# Patient Record
Sex: MALE | Race: WHITE | NOT HISPANIC OR LATINO | Employment: FULL TIME | URBAN - METROPOLITAN AREA
[De-identification: names, ages, dates, MRNs, and addresses within clinical notes are randomized per-mention and may not be internally consistent; named-entity substitution may affect disease eponyms.]

---

## 2016-01-26 LAB — HBA1C MFR BLD HPLC: 9.3 %

## 2016-05-23 LAB — HBA1C MFR BLD HPLC: 9.3 %

## 2017-01-19 ENCOUNTER — HOSPITAL ENCOUNTER (OUTPATIENT)
Dept: RADIOLOGY | Facility: HOSPITAL | Age: 47
Discharge: HOME | End: 2017-01-19
Attending: RADIOLOGY

## 2017-01-19 DIAGNOSIS — Z76.89 REFERRAL OF PATIENT WITHOUT EXAMINATION OR TREATMENT: ICD-10-CM

## 2017-01-21 LAB — HBA1C MFR BLD HPLC: 7.8 %

## 2017-05-08 ENCOUNTER — TRANSCRIBE ORDERS (OUTPATIENT)
Dept: ADMINISTRATIVE | Facility: HOSPITAL | Age: 47
End: 2017-05-08

## 2017-05-08 DIAGNOSIS — R11.2 NAUSEA AND VOMITING, INTRACTABILITY OF VOMITING NOT SPECIFIED, UNSPECIFIED VOMITING TYPE: Primary | ICD-10-CM

## 2017-06-15 ENCOUNTER — HOSPITAL ENCOUNTER (OUTPATIENT)
Dept: NUCLEAR MEDICINE | Facility: HOSPITAL | Age: 47
Discharge: HOME | End: 2017-06-15
Attending: INTERNAL MEDICINE
Payer: COMMERCIAL

## 2017-06-15 VITALS — WEIGHT: 210 LBS

## 2017-06-15 DIAGNOSIS — R11.2 NAUSEA AND VOMITING, INTRACTABILITY OF VOMITING NOT SPECIFIED, UNSPECIFIED VOMITING TYPE: ICD-10-CM

## 2017-06-15 PROCEDURE — A9541 TC99M SULFUR COLLOID: HCPCS

## 2017-06-15 PROCEDURE — 78264 GASTRIC EMPTYING IMG STUDY: CPT

## 2017-10-17 LAB — HBA1C MFR BLD HPLC: 8.3 %

## 2018-08-30 ENCOUNTER — OFFICE VISIT (OUTPATIENT)
Dept: ENDOCRINOLOGY | Facility: HOSPITAL | Age: 48
End: 2018-08-30
Payer: COMMERCIAL

## 2018-08-30 ENCOUNTER — TELEPHONE (OUTPATIENT)
Dept: ENDOCRINOLOGY | Facility: HOSPITAL | Age: 48
End: 2018-08-30

## 2018-08-30 VITALS
BODY MASS INDEX: 29.54 KG/M2 | SYSTOLIC BLOOD PRESSURE: 132 MMHG | WEIGHT: 211 LBS | HEIGHT: 71 IN | DIASTOLIC BLOOD PRESSURE: 78 MMHG

## 2018-08-30 DIAGNOSIS — I10 ESSENTIAL HYPERTENSION: ICD-10-CM

## 2018-08-30 DIAGNOSIS — E10.42 TYPE 1 DIABETES MELLITUS WITH DIABETIC POLYNEUROPATHY (HCC): ICD-10-CM

## 2018-08-30 DIAGNOSIS — E78.5 HYPERLIPIDEMIA, UNSPECIFIED HYPERLIPIDEMIA TYPE: ICD-10-CM

## 2018-08-30 DIAGNOSIS — E10.42 TYPE 1 DIABETES MELLITUS WITH DIABETIC POLYNEUROPATHY (HCC): Primary | ICD-10-CM

## 2018-08-30 DIAGNOSIS — E10.319 DIABETIC RETINOPATHY ASSOCIATED WITH TYPE 1 DIABETES MELLITUS, MACULAR EDEMA PRESENCE UNSPECIFIED, UNSPECIFIED LATERALITY, UNSPECIFIED RETINOPATHY SEVERITY (HCC): ICD-10-CM

## 2018-08-30 PROCEDURE — 99204 OFFICE O/P NEW MOD 45 MIN: CPT | Performed by: INTERNAL MEDICINE

## 2018-08-30 RX ORDER — LISINOPRIL 20 MG/1
20 TABLET ORAL DAILY
Qty: 90 TABLET | Refills: 3 | Status: SHIPPED | OUTPATIENT
Start: 2018-08-30 | End: 2019-09-30

## 2018-08-30 RX ORDER — GABAPENTIN 400 MG/1
CAPSULE ORAL
Qty: 90 CAPSULE | Refills: 3 | Status: SHIPPED | OUTPATIENT
Start: 2018-08-30 | End: 2019-03-07 | Stop reason: SDUPTHER

## 2018-08-30 RX ORDER — AZELASTINE HYDROCHLORIDE AND FLUTICASONE PROPIONATE 137; 50 UG/1; UG/1
SPRAY, METERED NASAL
Refills: 5 | COMMUNITY
Start: 2018-08-28

## 2018-08-30 RX ORDER — GABAPENTIN 100 MG/1
100 CAPSULE ORAL 3 TIMES DAILY
Refills: 3 | COMMUNITY
Start: 2018-07-13 | End: 2018-08-30 | Stop reason: SDUPTHER

## 2018-08-30 RX ORDER — GABAPENTIN 100 MG/1
400 CAPSULE ORAL 3 TIMES DAILY
Qty: 180 CAPSULE | Refills: 5 | Status: SHIPPED | OUTPATIENT
Start: 2018-08-30 | End: 2018-08-30 | Stop reason: SDUPTHER

## 2018-08-30 RX ORDER — SIMVASTATIN 40 MG
40 TABLET ORAL
Refills: 3 | COMMUNITY
Start: 2018-07-13 | End: 2018-08-30 | Stop reason: SDUPTHER

## 2018-08-30 RX ORDER — LISINOPRIL 20 MG/1
20 TABLET ORAL DAILY
Refills: 3 | COMMUNITY
Start: 2018-08-27 | End: 2018-08-30 | Stop reason: SDUPTHER

## 2018-08-30 RX ORDER — SIMVASTATIN 40 MG
40 TABLET ORAL
Qty: 90 TABLET | Refills: 3 | Status: SHIPPED | OUTPATIENT
Start: 2018-08-30 | End: 2019-10-17 | Stop reason: SDUPTHER

## 2018-08-30 RX ORDER — BUPROPION HYDROCHLORIDE 300 MG/1
300 TABLET ORAL DAILY
Refills: 3 | COMMUNITY
Start: 2018-05-31

## 2018-08-30 NOTE — LETTER
August 30, 2018     Tran Bedolla DO  [address]  [place] [address]    Patient: Rena Aldridge   YOB: 1970   Date of Visit: 8/30/2018       Dear Dr Keena Palmer: Thank you for referring Rena Aldridge to me for evaluation  Below are my notes for this consultation  If you have questions, please do not hesitate to call me  I look forward to following your patient along with you  Sincerely,        Tanna Edwards DO        CC: No Recipients  Tanna Edwards DO  8/30/2018  9:27 AM  Sign at close encounter  8/30/2018    Assessment/Plan      Diagnoses and all orders for this visit:    Type 1 diabetes mellitus with diabetic polyneuropathy ([facility] [address] )  -     HEMOGLOBIN A1C W/ EAG ESTIMATION Lab Collect; Future  -     Comprehensive metabolic panel Lab Collect; Future  -     Microalbumin / creatinine urine ratio- Lab Collect; Future  -     gabapentin (NEURONTIN) 100 mg capsule; Take 4 capsules (400 mg total) by mouth 3 (three) times a day  -     Comprehensive metabolic panel Lab Collect; Future  -     TSH, 3rd generation Lab Collect; Future  -     HEMOGLOBIN A1C W/ EAG ESTIMATION Lab Collect; Future    Diabetic retinopathy associated with type 1 diabetes mellitus, macular edema presence unspecified, unspecified laterality, unspecified retinopathy severity (HCC)    Essential hypertension  -     lisinopril (ZESTRIL) 20 mg tablet; Take 1 tablet (20 mg total) by mouth daily    Hyperlipidemia, unspecified hyperlipidemia type  -     Lipid Panel with Direct LDL reflex Lab Collect; Future  -     simvastatin (ZOCOR) 40 mg tablet; Take 1 tablet (40 mg total) by mouth daily at bedtime    Other orders  -     DYMISTA 137-50 MCG/ACT SUSP; SPRAY 1 SPRAY IN EACH NOSTRIL BY INTRANASAL ROUTE 2 TIMES PER DAY  -     buPROPion (WELLBUTRIN XL) 300 mg 24 hr tablet; Take 300 mg by mouth daily  -     Discontinue: gabapentin (NEURONTIN) 100 mg capsule;  Take 100 mg by mouth 3 (three) times a day  -     NOVOLOG 100 UNIT/ML injection; USE UP  UNITS EVERY DAY AS DIRECTED  -     Discontinue: lisinopril (ZESTRIL) 20 mg tablet; Take 20 mg by mouth daily  -     Discontinue: simvastatin (ZOCOR) 40 mg tablet; Take 40 mg by mouth daily at bedtime        Assessment/Plan:  1  Type 1 diabetes with history of neuropathy and retinopathy per prior records: To target his hyperglycemia at bedtime, I have adjusted his 12:00 p m  insulin to carbohydrate ratio to 6  Continue all other settings as is  I provided information for the [employer] representative as well as upgrade options as he is out of warranty with his current animas pump  He is interested in 12 G system and will pursue that if possible  Follow-up in 3 months with labs just prior  I have ordered labs for now to update his blood work and we will call with the results  Current animas vibe insulin pump settings are as follows:  Basal rates:  12:00 a m  1 3, 2:00 a m  1 6, 8:00 a m  2 5, 12:00 p m  1 6, 10:00 p m  1 3  Insulin carbohydrate ratio 12:00 a m  6, 12:00 p m  6, ISF 30, target 120+/-10     2   Hypertension:  Continue lisinopril 20 mg daily  3   Hyperlipidemia:  Continue simvastatin 40 mg daily  CC: Diabetes Consult    History of Present Illness     HPI: Emerson Licea is a 57y o  year old male with type 1 diabetes for about 20 years  He is on insulin at home and takes Novolog via insulin pump  He denies any polyuria, polydipsia, nocturia and blurry vision  He denies nephropathy but does admit to neuropathy and retinopathy  Hypoglycemic episodes: No      The patient is due for an eye exam with  [facility]  Blood Sugar/Glucometer/Pump/CGM review: Insulin pump download of his animus pump shows average blood sugar of 250 an average C GM sugar of 205  Average insulin doses 77 5 units per day on average carb intake is 143 g per day  49% is basal and 51% is bolus    Blood sugars are higher in the morning though he is having hyperglycemia after dinner and before bedtime  On the C GM data his blood sugars appear stable from bedtime to morning  For hypertension he takes lisinopril 20 mg daily and for hyperlipidemia he is on simvastatin 40 mg daily  Review of Systems   Constitutional: Negative for chills, fatigue and fever  HENT: Negative for trouble swallowing and voice change  Eyes: Negative for visual disturbance  Respiratory: Negative for shortness of breath  Cardiovascular: Negative for chest pain, palpitations and leg swelling  Gastrointestinal: Negative for abdominal pain, nausea and vomiting  Endocrine: Negative for polydipsia and polyuria  Musculoskeletal: Negative for arthralgias and myalgias  Skin: Negative for rash  Neurological: Negative for dizziness, tremors and weakness  Hematological: Negative for adenopathy  Psychiatric/Behavioral: Negative for agitation and confusion  Historical Information   History reviewed  No pertinent past medical history  History reviewed  No pertinent surgical history  Social History   History   Alcohol Use    Yes     Comment: 1 drink daily     History   Drug Use No     History   Smoking Status    Former Smoker   Smokeless Tobacco    Former User     Family History: History reviewed  No pertinent family history      Meds/Allergies   Current Outpatient Prescriptions   Medication Sig Dispense Refill    buPROPion (WELLBUTRIN XL) 300 mg 24 hr tablet Take 300 mg by mouth daily  3    DYMISTA 137-50 MCG/ACT SUSP SPRAY 1 SPRAY IN EACH NOSTRIL BY INTRANASAL ROUTE 2 TIMES PER DAY  5    gabapentin (NEURONTIN) 100 mg capsule Take 4 capsules (400 mg total) by mouth 3 (three) times a day 180 capsule 5    lisinopril (ZESTRIL) 20 mg tablet Take 1 tablet (20 mg total) by mouth daily 90 tablet 3    NOVOLOG 100 UNIT/ML injection USE UP  UNITS EVERY DAY AS DIRECTED  3    simvastatin (ZOCOR) 40 mg tablet Take 1 tablet (40 mg total) by mouth daily at bedtime 90 tablet 3     No current facility-administered medications for this visit  Allergies   Allergen Reactions    Grass Extracts [Gramineae Pollens]     Tree Extract        Objective   Vitals: Blood pressure 132/78, height 5' 11" (1 803 m), weight 95 7 kg (211 lb)  Invasive Devices          No matching active lines, drains, or airways          Physical Exam   Constitutional: He is oriented to person, place, and time  He appears well-developed and well-nourished  No distress  HENT:   Head: Normocephalic and atraumatic  Eyes: Conjunctivae and EOM are normal  Pupils are equal, round, and reactive to light  Neck: Normal range of motion  Neck supple  No thyromegaly present  Cardiovascular: Normal rate and regular rhythm  No murmur heard  Pulmonary/Chest: Effort normal and breath sounds normal  No respiratory distress  He has no wheezes  Abdominal: Soft  Bowel sounds are normal  He exhibits no distension  There is no tenderness  Musculoskeletal: Normal range of motion  He exhibits no edema  Neurological: He is alert and oriented to person, place, and time  He exhibits normal muscle tone  Skin: Skin is warm and dry  No rash noted  He is not diaphoretic  Psychiatric: He has a normal mood and affect  His behavior is normal    Vitals reviewed  The history was obtained from the review of the chart and from the patient  Lab Results:    Most recent Alc is  Lab Results   Component Value Date    HGBA1C 8 3 10/17/2017             Recent Results (from the past 40897 hour(s))   Hemoglobin A1C    Collection Time: 10/17/17 12:00 AM   Result Value Ref Range    Hemoglobin A1C 8 3          No future appointments

## 2018-08-30 NOTE — PROGRESS NOTES
8/30/2018    Assessment/Plan      Diagnoses and all orders for this visit:    Type 1 diabetes mellitus with diabetic polyneuropathy ([facility] [address] )  -     HEMOGLOBIN A1C W/ EAG ESTIMATION Lab Collect; Future  -     Comprehensive metabolic panel Lab Collect; Future  -     Microalbumin / creatinine urine ratio- Lab Collect; Future  -     gabapentin (NEURONTIN) 100 mg capsule; Take 4 capsules (400 mg total) by mouth 3 (three) times a day  -     Comprehensive metabolic panel Lab Collect; Future  -     TSH, 3rd generation Lab Collect; Future  -     HEMOGLOBIN A1C W/ EAG ESTIMATION Lab Collect; Future    Diabetic retinopathy associated with type 1 diabetes mellitus, macular edema presence unspecified, unspecified laterality, unspecified retinopathy severity (HCC)    Essential hypertension  -     lisinopril (ZESTRIL) 20 mg tablet; Take 1 tablet (20 mg total) by mouth daily    Hyperlipidemia, unspecified hyperlipidemia type  -     Lipid Panel with Direct LDL reflex Lab Collect; Future  -     simvastatin (ZOCOR) 40 mg tablet; Take 1 tablet (40 mg total) by mouth daily at bedtime    Other orders  -     DYMISTA 137-50 MCG/ACT SUSP; SPRAY 1 SPRAY IN EACH NOSTRIL BY INTRANASAL ROUTE 2 TIMES PER DAY  -     buPROPion (WELLBUTRIN XL) 300 mg 24 hr tablet; Take 300 mg by mouth daily  -     Discontinue: gabapentin (NEURONTIN) 100 mg capsule; Take 100 mg by mouth 3 (three) times a day  -     NOVOLOG 100 UNIT/ML injection; USE UP  UNITS EVERY DAY AS DIRECTED  -     Discontinue: lisinopril (ZESTRIL) 20 mg tablet; Take 20 mg by mouth daily  -     Discontinue: simvastatin (ZOCOR) 40 mg tablet; Take 40 mg by mouth daily at bedtime        Assessment/Plan:  1  Type 1 diabetes with history of neuropathy and retinopathy per prior records: To target his hyperglycemia at bedtime, I have adjusted his 12:00 p m  insulin to carbohydrate ratio to 6  Continue all other settings as is    I provided information for the [employer] representative as well as upgrade options as he is out of warranty with his current animas pump  He is interested in 12 G system and will pursue that if possible  Follow-up in 3 months with labs just prior  I have ordered labs for now to update his blood work and we will call with the results  Current animas jene insulin pump settings are as follows:  Basal rates:  12:00 a m  1 3, 2:00 a m  1 6, 8:00 a m  2 5, 12:00 p m  1 6, 10:00 p m  1 3  Insulin carbohydrate ratio 12:00 a m  6, 12:00 p m  6, ISF 30, target 120+/-10     2   Hypertension:  Continue lisinopril 20 mg daily  3   Hyperlipidemia:  Continue simvastatin 40 mg daily  CC: Diabetes Consult    History of Present Illness     HPI: Cecilia Carlos is a 57y o  year old male with type 1 diabetes for about 20 years  He is on insulin at home and takes Novolog via insulin pump  He denies any polyuria, polydipsia, nocturia and blurry vision  He denies nephropathy but does admit to neuropathy and retinopathy  Hypoglycemic episodes: No      The patient is due for an eye exam with dr Hunter Mensah  Blood Sugar/Glucometer/Pump/CGM review: Insulin pump download of his animus pump shows average blood sugar of 250 an average C GM sugar of 205  Average insulin doses 77 5 units per day on average carb intake is 143 g per day  49% is basal and 51% is bolus  Blood sugars are higher in the morning though he is having hyperglycemia after dinner and before bedtime  On the C GM data his blood sugars appear stable from bedtime to morning  For hypertension he takes lisinopril 20 mg daily and for hyperlipidemia he is on simvastatin 40 mg daily  Review of Systems   Constitutional: Negative for chills, fatigue and fever  HENT: Negative for trouble swallowing and voice change  Eyes: Negative for visual disturbance  Respiratory: Negative for shortness of breath  Cardiovascular: Negative for chest pain, palpitations and leg swelling     Gastrointestinal: Negative for abdominal pain, nausea and vomiting  Endocrine: Negative for polydipsia and polyuria  Musculoskeletal: Negative for arthralgias and myalgias  Skin: Negative for rash  Neurological: Negative for dizziness, tremors and weakness  Hematological: Negative for adenopathy  Psychiatric/Behavioral: Negative for agitation and confusion  Historical Information   History reviewed  No pertinent past medical history  History reviewed  No pertinent surgical history  Social History   History   Alcohol Use    Yes     Comment: 1 drink daily     History   Drug Use No     History   Smoking Status    Former Smoker   Smokeless Tobacco    Former User     Family History: History reviewed  No pertinent family history  Meds/Allergies   Current Outpatient Prescriptions   Medication Sig Dispense Refill    buPROPion (WELLBUTRIN XL) 300 mg 24 hr tablet Take 300 mg by mouth daily  3    DYMISTA 137-50 MCG/ACT SUSP SPRAY 1 SPRAY IN EACH NOSTRIL BY INTRANASAL ROUTE 2 TIMES PER DAY  5    gabapentin (NEURONTIN) 100 mg capsule Take 4 capsules (400 mg total) by mouth 3 (three) times a day 180 capsule 5    lisinopril (ZESTRIL) 20 mg tablet Take 1 tablet (20 mg total) by mouth daily 90 tablet 3    NOVOLOG 100 UNIT/ML injection USE UP  UNITS EVERY DAY AS DIRECTED  3    simvastatin (ZOCOR) 40 mg tablet Take 1 tablet (40 mg total) by mouth daily at bedtime 90 tablet 3     No current facility-administered medications for this visit  Allergies   Allergen Reactions    Grass Extracts [Gramineae Pollens]     Tree Extract        Objective   Vitals: Blood pressure 132/78, height 5' 11" (1 803 m), weight 95 7 kg (211 lb)  Invasive Devices          No matching active lines, drains, or airways          Physical Exam   Constitutional: He is oriented to person, place, and time  He appears well-developed and well-nourished  No distress  HENT:   Head: Normocephalic and atraumatic     Eyes: Conjunctivae and EOM are normal  Pupils are equal, round, and reactive to light  Neck: Normal range of motion  Neck supple  No thyromegaly present  Cardiovascular: Normal rate and regular rhythm  No murmur heard  Pulmonary/Chest: Effort normal and breath sounds normal  No respiratory distress  He has no wheezes  Abdominal: Soft  Bowel sounds are normal  He exhibits no distension  There is no tenderness  Musculoskeletal: Normal range of motion  He exhibits no edema  Neurological: He is alert and oriented to person, place, and time  He exhibits normal muscle tone  Skin: Skin is warm and dry  No rash noted  He is not diaphoretic  Psychiatric: He has a normal mood and affect  His behavior is normal    Vitals reviewed  The history was obtained from the review of the chart and from the patient  Lab Results:    Most recent Alc is  Lab Results   Component Value Date    HGBA1C 8 3 10/17/2017             Recent Results (from the past 24065 hour(s))   Hemoglobin A1C    Collection Time: 10/17/17 12:00 AM   Result Value Ref Range    Hemoglobin A1C 8 3          No future appointments

## 2018-08-30 NOTE — TELEPHONE ENCOUNTER
Pt called picked up his script of gabapentin its 100 mg tablets to take 4 tablets (400mg) three times a day, he thought you were going to give him 400 mg tablets, and he wanted 90 days and he says he only got enough for 15 days

## 2018-09-09 LAB
ALBUMIN SERPL-MCNC: 4.2 G/DL (ref 3.5–5.5)
ALBUMIN/CREAT UR: 22.1 MG/G CREAT (ref 0–30)
ALBUMIN/GLOB SERPL: 1.6 {RATIO} (ref 1.2–2.2)
ALP SERPL-CCNC: 61 IU/L (ref 39–117)
ALT SERPL-CCNC: 24 IU/L (ref 0–44)
AMBIG ABBREV DEFAULT: NORMAL
AST SERPL-CCNC: 31 IU/L (ref 0–40)
BILIRUB SERPL-MCNC: 0.2 MG/DL (ref 0–1.2)
BUN SERPL-MCNC: 8 MG/DL (ref 6–24)
BUN/CREAT SERPL: 9 (ref 9–20)
CALCIUM SERPL-MCNC: 8.9 MG/DL (ref 8.7–10.2)
CHLORIDE SERPL-SCNC: 98 MMOL/L (ref 96–106)
CHOLEST SERPL-MCNC: 170 MG/DL (ref 100–199)
CO2 SERPL-SCNC: 23 MMOL/L (ref 20–29)
CREAT SERPL-MCNC: 0.9 MG/DL (ref 0.76–1.27)
CREAT UR-MCNC: 166.5 MG/DL
EST. AVERAGE GLUCOSE BLD GHB EST-MCNC: 114 MG/DL
GLOBULIN SER-MCNC: 2.6 G/DL (ref 1.5–4.5)
GLUCOSE SERPL-MCNC: 199 MG/DL (ref 65–99)
HBA1C MFR BLD: 5.6 % (ref 4.8–5.6)
HDLC SERPL-MCNC: 76 MG/DL
LDLC SERPL CALC-MCNC: 80 MG/DL (ref 0–99)
LDLC/HDLC SERPL: 1.1 RATIO (ref 0–3.6)
MICROALBUMIN UR-MCNC: 36.8 UG/ML
POTASSIUM SERPL-SCNC: 4.3 MMOL/L (ref 3.5–5.2)
PROT SERPL-MCNC: 6.8 G/DL (ref 6–8.5)
SL AMB EGFR AFRICAN AMERICAN: 118 ML/MIN/1.73
SL AMB EGFR NON AFRICAN AMERICAN: 102 ML/MIN/1.73
SL AMB VLDL CHOLESTEROL CALC: 14 MG/DL (ref 5–40)
SODIUM SERPL-SCNC: 138 MMOL/L (ref 134–144)
TRIGL SERPL-MCNC: 72 MG/DL (ref 0–149)

## 2018-10-03 ENCOUNTER — OFFICE VISIT (OUTPATIENT)
Dept: DIABETES SERVICES | Facility: CLINIC | Age: 48
End: 2018-10-03

## 2018-10-03 DIAGNOSIS — E10.42 TYPE 1 DIABETES MELLITUS WITH DIABETIC POLYNEUROPATHY (HCC): ICD-10-CM

## 2018-10-03 PROCEDURE — MPSTAR

## 2018-10-03 NOTE — PROGRESS NOTES
Zuly Ch has been using an [employer] pump and a DexCom 5 sensor  He is switching to the 670 G Medtronic pump and the guardian sensor  He has used a [employer]tr[employer] pump in the past and the and the enlite sensor  Areas reviewed with Zuly Ch include filling a reservoir and inserting into the pump,  Setting/editing basal and bolus advisor  Administering a bolus dose for meal bolus or correction bolus  We also discussed the need to change his infusion set every 3 days  We did link his meter to the pump,  He has other test strips that he will enter manually to use up his supply  We discussed him having a travel emily with his pump supplies that he can can with him to work  He does keep fasting acting carbs with him  Zuly Ch was instructed in th use of the guardian sensor and was able to insert and properly tape the sensor  His sensor was linked and the following alerts were programed alert before low, low alert - 90 per his request, high alert - 225, and alert on high  He understands the difference between his sensor and blood glucose reading  Calibration process on day 1 and then calibration daily at least every 12 hours  He left the office in warmup mode  He is scheduled for auto mode in a few weeks

## 2018-10-03 NOTE — PATIENT INSTRUCTIONS
Calibrate sensor on 1st day within 2 hours of start when prompted, then with 6 hours and then 12 hours  Calibration required at least every 12 hours  Karrie Nissen

## 2018-10-22 ENCOUNTER — OFFICE VISIT (OUTPATIENT)
Dept: DIABETES SERVICES | Facility: CLINIC | Age: 48
End: 2018-10-22

## 2018-10-22 DIAGNOSIS — E10.42 TYPE 1 DIABETES MELLITUS WITH DIABETIC POLYNEUROPATHY (HCC): ICD-10-CM

## 2018-10-22 PROCEDURE — MWEEKF

## 2018-10-23 NOTE — PROGRESS NOTES
Sandy Ambriz has been on the 670 G pump with sensor for over 2 weeks he has been calibrating at least 2 times a day  We reviewed the taping process for the sensor  A download of his pump was done and I have asked him to sign up for [employer] so that he will be able to download from home  I sent him an invitation to our practice  At this session we was started in auto mode  The home screen and the operation of the manual, auto and safe mode were explained to him  I discussed reasons he would be existed out of auto mode and how to return  He is unable to return to office net week, he will be out of the area  I have asked him to do a download and notify us so that it can be reviewed  He is to contact [facility] for questions or [employer]  He is scheduled for a follow up appointment in December with his doctor

## 2018-10-24 ENCOUNTER — TELEPHONE (OUTPATIENT)
Dept: ENDOCRINOLOGY | Facility: HOSPITAL | Age: 48
End: 2018-10-24

## 2018-10-24 NOTE — TELEPHONE ENCOUNTER
Reviewed 670 G insulin pump download from 10/09/2018 through 10/22/2018: He is in manual mode 100% of the time  Average sensor blood sugars 191 with a standard deviation of 46  Total daily doses 71 units  44% is bolus  I would suggest we adjust his carbohydrate ratio to 5 0  I would also increase 12:00 a m  basal rate to 1 point 3 5 and 2:00 a m  basal rate 1 65  After these changes insulin pump setting should be as follows:  Basal rates:  12:00 a m  1 35, 2:00 a m  1 65, 8:00 a m  2 5, 12:00 p m  1 6, 10:00 p m  1 3  Carb ratio 5 0, sensitivity 30, target 130

## 2018-11-02 ENCOUNTER — TELEPHONE (OUTPATIENT)
Dept: ENDOCRINOLOGY | Facility: HOSPITAL | Age: 48
End: 2018-11-02

## 2018-12-11 LAB
ALBUMIN SERPL-MCNC: 4.5 G/DL (ref 3.5–5.5)
ALBUMIN/GLOB SERPL: 1.7 {RATIO} (ref 1.2–2.2)
ALP SERPL-CCNC: 63 IU/L (ref 39–117)
ALT SERPL-CCNC: 29 IU/L (ref 0–44)
AST SERPL-CCNC: 29 IU/L (ref 0–40)
BILIRUB SERPL-MCNC: 0.2 MG/DL (ref 0–1.2)
BUN SERPL-MCNC: 10 MG/DL (ref 6–24)
BUN/CREAT SERPL: 10 (ref 9–20)
CALCIUM SERPL-MCNC: 9.6 MG/DL (ref 8.7–10.2)
CHLORIDE SERPL-SCNC: 100 MMOL/L (ref 96–106)
CO2 SERPL-SCNC: 26 MMOL/L (ref 20–29)
CREAT SERPL-MCNC: 1.02 MG/DL (ref 0.76–1.27)
EST. AVERAGE GLUCOSE BLD GHB EST-MCNC: 177 MG/DL
GLOBULIN SER-MCNC: 2.6 G/DL (ref 1.5–4.5)
GLUCOSE SERPL-MCNC: 131 MG/DL (ref 65–99)
HBA1C MFR BLD: 7.8 % (ref 4.8–5.6)
LABCORP COMMENT: NORMAL
POTASSIUM SERPL-SCNC: 4.5 MMOL/L (ref 3.5–5.2)
PROT SERPL-MCNC: 7.1 G/DL (ref 6–8.5)
SL AMB EGFR AFRICAN AMERICAN: 101 ML/MIN/1.73
SL AMB EGFR NON AFRICAN AMERICAN: 88 ML/MIN/1.73
SODIUM SERPL-SCNC: 142 MMOL/L (ref 134–144)
TSH SERPL DL<=0.005 MIU/L-ACNC: 1.89 UIU/ML (ref 0.45–4.5)

## 2018-12-14 ENCOUNTER — OFFICE VISIT (OUTPATIENT)
Dept: ENDOCRINOLOGY | Facility: HOSPITAL | Age: 48
End: 2018-12-14
Payer: COMMERCIAL

## 2018-12-14 VITALS
BODY MASS INDEX: 31.02 KG/M2 | HEART RATE: 96 BPM | DIASTOLIC BLOOD PRESSURE: 80 MMHG | HEIGHT: 71 IN | WEIGHT: 221.6 LBS | SYSTOLIC BLOOD PRESSURE: 122 MMHG

## 2018-12-14 DIAGNOSIS — E04.1 THYROID NODULE: ICD-10-CM

## 2018-12-14 DIAGNOSIS — E10.319 DIABETIC RETINOPATHY ASSOCIATED WITH TYPE 1 DIABETES MELLITUS, MACULAR EDEMA PRESENCE UNSPECIFIED, UNSPECIFIED LATERALITY, UNSPECIFIED RETINOPATHY SEVERITY (HCC): ICD-10-CM

## 2018-12-14 DIAGNOSIS — E10.42 TYPE 1 DIABETES MELLITUS WITH DIABETIC POLYNEUROPATHY (HCC): Primary | ICD-10-CM

## 2018-12-14 DIAGNOSIS — E78.5 HYPERLIPIDEMIA, UNSPECIFIED HYPERLIPIDEMIA TYPE: ICD-10-CM

## 2018-12-14 DIAGNOSIS — Z96.41 INSULIN PUMP IN PLACE: ICD-10-CM

## 2018-12-14 DIAGNOSIS — I10 ESSENTIAL HYPERTENSION: ICD-10-CM

## 2018-12-14 PROCEDURE — 99214 OFFICE O/P EST MOD 30 MIN: CPT | Performed by: NURSE PRACTITIONER

## 2018-12-14 PROCEDURE — 95251 CONT GLUC MNTR ANALYSIS I&R: CPT | Performed by: NURSE PRACTITIONER

## 2018-12-14 NOTE — PATIENT INSTRUCTIONS
Be mindful of diet  Stay active and stay hydrated  We made adjustments to her pump today to help her glycemic control throughout the day  Please watch her e-mail for an invitation to [address] [place] [address]  Please perform a download of her pump/sensor via [employer] in the next few weeks for review  Continue lisinopril and simvastatin  Obtain lab work as prescribed  Obtain a diabetic eye exam     Please obtain a thyroid ultrasound, when possible

## 2018-12-14 NOTE — PROGRESS NOTES
Amy Carrera 57 y o  male MRN: [id number]    Encounter: [id number]      Assessment/Plan     Assessment: This is a 57y o -year-old male with type 1 diabetes with neuropathy on insulin pump therapy with hypertension and hyperlipidemia  Plan:  1  Type 1 diabetes with history of neuropathy and retinopathy per prior records:   His most recent hemoglobin A1c is 7 8  Reviewed his [employer] pump and sensor report at the time of the visit  He appears to be having frequent hyperglycemia after meals, sometimes resulting in auto mode exit for exceeding max delivery  I have adjusted his insulin to carbohydrate ratio from 5-4  For now, his basal rates remain the same  Discussed the importance of performing required blood glucose monitoring for auto mode  Discussed utilizing [employer] to download reports for review in the office so that adjustments can be made  We will send an electronic invitation to him to start this process  He will contact us with blood sugar readings in 2 weeks for review so further adjustments can be made, if necessary  I encouraged him to obtain a diabetic eye exam    Diabetic foot exam performed in the office today reveals good monofilament sensation  Check hemoglobin A1c prior to next visit       2  Hypertension:  Continue lisinopril 20 mg daily  Check comprehensive metabolic panel prior to next visit      3  Hyperlipidemia:  Continue simvastatin 40 mg daily  4    Right-sided thyroid nodule:  Palpated on exam today  His most recent TSH is normal  I have asked him to obtain a thyroid ultrasound for confirmation and surveillance  CC:   Type 1 Diabetes follow-up    History of Present Illness     HPI:  57y o  year old male with type 1 diabetes for about 20 years  He is on insulin at home and takes Novolog via insulin pump  He denies any polyuria, polydipsia, nocturia and blurry vision  He denies nephropathy but does admit to neuropathy and retinopathy     his most recent hemoglobin A1c from December 10, 2018 is 7 8  He is utilizing a Medtronic 670 G insulin pump with NovoLog insulin  His settings are as follows:  Basal rates:  12:00 a m - 1 35, 2:00 a m - 1 65, 8:00 a m - 2 5, 12:00 p m - 1 6, 10:00 p m - 1 3  Carb ratio- 5 0, sensitivity- 30, blood glucose target -130      Hypoglycemic episodes: No       The patient is due for an eye exam, usual follows Dr Quigley  Complains of discomfort and numbness in the balls of his feet at times  Does not follow Podiatry for regular diabetic foot exams      Blood Sugar/Glucometer/Pump/CGM review: Insulin pump download of his animus pump shows average blood sugar of 199  Average insulin doses 73 units per day on average carb intake is 125 g per day  He is in auto mode 70% of the time and manual mode 30% of the time  55 % is basal and 45 % is bolus  Blood sugars appear higher after meals resulting in exit from auto mode due to auto mode max delivery  He is also being exit at of auto mode for not completing his blood glucose as required      For his hypertension, he is treated with lisinopril 20 mg daily  His hyperlipidemia is treated with simvastatin 40 mg daily         Review of Systems   Constitutional: Negative  Negative for chills, fatigue and fever  HENT: Negative  Negative for trouble swallowing and voice change  Eyes: Negative  Negative for photophobia, pain, discharge, redness, itching and visual disturbance  Respiratory: Negative for cough and shortness of breath  Cardiovascular: Negative for chest pain and palpitations  Gastrointestinal: Negative for abdominal pain, constipation, diarrhea, nausea and vomiting  Endocrine: Positive for polyuria (nocturia at times but not consisent)  Negative for cold intolerance, heat intolerance, polydipsia and polyphagia  Genitourinary: Negative  Musculoskeletal: Negative  Skin: Negative  Allergic/Immunologic: Negative      Neurological: Positive for numbness (in feet at times)  Negative for syncope, light-headedness and headaches  Hematological: Negative  Psychiatric/Behavioral: Negative  All other systems reviewed and are negative  Historical Information   No past medical history on file  No past surgical history on file  Social History   History   Alcohol Use    Yes     Comment: 1 drink daily     History   Drug Use No     History   Smoking Status    Former Smoker    Packs/day: 0 50    Years: 30 00    Types: Cigarettes    Quit date: 12/14/2012   Smokeless Tobacco    Former User     Family History:   Family History   Problem Relation Age of Onset    No Known Problems Mother     Hyperlipidemia Father     Hypertension Father     Irritable bowel syndrome Father     No Known Problems Brother        Meds/Allergies   Current Outpatient Prescriptions   Medication Sig Dispense Refill    buPROPion (WELLBUTRIN XL) 300 mg 24 hr tablet Take 300 mg by mouth daily  3    DYMISTA 137-50 MCG/ACT SUSP SPRAY 1 SPRAY IN EACH NOSTRIL BY INTRANASAL ROUTE 2 TIMES PER DAY  5    gabapentin (NEURONTIN) 400 mg capsule 1 tab bid 90 capsule 3    lisinopril (ZESTRIL) 20 mg tablet Take 1 tablet (20 mg total) by mouth daily 90 tablet 3    NOVOLOG 100 UNIT/ML injection USE UP  UNITS EVERY DAY AS DIRECTED  3    simvastatin (ZOCOR) 40 mg tablet Take 1 tablet (40 mg total) by mouth daily at bedtime 90 tablet 3     No current facility-administered medications for this visit  Allergies   Allergen Reactions    Grass Extracts [Gramineae Pollens]     Tree Extract        Objective   Vitals: Blood pressure 122/80, pulse 96, height 5' 11" (1 803 m), weight 101 kg (221 lb 9 6 oz)  Physical Exam   Constitutional: He is oriented to person, place, and time  He appears well-developed and well-nourished  No distress  overweight   HENT:   Head: Normocephalic and atraumatic     Nose: Nose normal    Mouth/Throat: Oropharynx is clear and moist    Eyes: Pupils are equal, round, and reactive to light  Conjunctivae and EOM are normal  Right eye exhibits no discharge  Left eye exhibits no discharge  No scleral icterus  Neck: Normal range of motion  Neck supple  No JVD present  No tracheal deviation present  No thyromegaly present  Right sided thyroid nodule   Cardiovascular: Normal rate, regular rhythm, normal heart sounds and intact distal pulses  Exam reveals no gallop  Pulses are no weak pulses  No murmur heard  Pulses:       Dorsalis pedis pulses are 1+ on the right side, and 1+ on the left side  Posterior tibial pulses are 1+ on the right side, and 1+ on the left side  Pulmonary/Chest: Effort normal and breath sounds normal  No stridor  No respiratory distress  He has no wheezes  He has no rales  He exhibits no tenderness  Abdominal: Soft  Bowel sounds are normal  He exhibits no distension  There is no tenderness  Musculoskeletal: Normal range of motion  He exhibits no edema, tenderness or deformity  Feet:   Right Foot:   Skin Integrity: Negative for ulcer, skin breakdown, erythema, warmth, callus or dry skin  Left Foot:   Skin Integrity: Negative for ulcer, skin breakdown, erythema, warmth, callus or dry skin  Lymphadenopathy:     He has no cervical adenopathy  Neurological: He is alert and oriented to person, place, and time  He displays normal reflexes  No cranial nerve deficit  Coordination normal    Skin: Skin is warm and dry  No rash noted  No erythema  No pallor  Dry skin   Psychiatric: He has a normal mood and affect  His behavior is normal  Judgment and thought content normal    Vitals reviewed  Patient's shoes and socks removed  Right Foot/Ankle   Right Foot Inspection  Skin Exam: skin normal and skin intact no dry skin, no warmth, no callus, no erythema, no maceration, no abnormal color, no pre-ulcer, no ulcer and no callus                          Toe Exam: ROM and strength within normal limits  Sensory       Monofilament testing: intact  Vascular  Capillary refills: < 3 seconds  The right DP pulse is 1+  The right PT pulse is 1+  Left Foot/Ankle  Left Foot Inspection  Skin Exam: skin normal and skin intactno dry skin, no warmth, no erythema, no maceration, normal color, no pre-ulcer, no ulcer and no callus                         Toe Exam: ROM and strength within normal limits                   Sensory       Monofilament: intact  Vascular  Capillary refills: < 3 seconds  The left DP pulse is 1+  The left PT pulse is 1+  Assign Risk Category:  No deformity present; No loss of protective sensation; No weak pulses       Risk: 0    Lab Results:   Lab Results   Component Value Date/Time    Hemoglobin A1C 7 8 (H) 12/10/2018 07:59 AM    Hemoglobin A1C 5 6 09/08/2018 08:33 AM    BUN 10 12/10/2018 07:59 AM    BUN 8 09/08/2018 08:33 AM    Potassium 4 5 12/10/2018 07:59 AM    Potassium 4 3 09/08/2018 08:33 AM    Chloride 100 12/10/2018 07:59 AM    Chloride 98 09/08/2018 08:33 AM    CO2 26 12/10/2018 07:59 AM    CO2 23 09/08/2018 08:33 AM    Albumin 4 5 12/10/2018 07:59 AM    Albumin 4 2 09/08/2018 08:33 AM    Globulin, Total 2 6 12/10/2018 07:59 AM    Globulin, Total 2 6 09/08/2018 08:33 AM    HDL 76 09/08/2018 08:33 AM    Triglycerides 72 09/08/2018 08:33 AM     Portions of the record may have been created with voice recognition software  Occasional wrong word or "sound a like" substitutions may have occurred due to the inherent limitations of voice recognition software  Read the chart carefully and recognize, using context, where substitutions have occurred

## 2018-12-18 ENCOUNTER — HOSPITAL ENCOUNTER (OUTPATIENT)
Dept: ULTRASOUND IMAGING | Facility: HOSPITAL | Age: 48
Discharge: HOME | End: 2018-12-18
Payer: COMMERCIAL

## 2018-12-18 DIAGNOSIS — E04.1 THYROID NODULE: ICD-10-CM

## 2018-12-18 PROCEDURE — 76536 US EXAM OF HEAD AND NECK: CPT

## 2018-12-21 ENCOUNTER — TELEPHONE (OUTPATIENT)
Dept: ENDOCRINOLOGY | Facility: HOSPITAL | Age: 48
End: 2018-12-21

## 2018-12-21 NOTE — PROGRESS NOTES
Please call the patient regarding result  Ultrasound reveals a small colloid cyst in the right lower lobe  Will continue to monitor over time

## 2019-01-30 DIAGNOSIS — E10.42 TYPE 1 DIABETES MELLITUS WITH DIABETIC POLYNEUROPATHY (HCC): Primary | ICD-10-CM

## 2019-03-07 DIAGNOSIS — E10.42 TYPE 1 DIABETES MELLITUS WITH DIABETIC POLYNEUROPATHY (HCC): ICD-10-CM

## 2019-03-07 RX ORDER — GABAPENTIN 400 MG/1
CAPSULE ORAL
Qty: 90 CAPSULE | Refills: 3 | Status: SHIPPED | OUTPATIENT
Start: 2019-03-07 | End: 2019-03-15

## 2019-03-09 LAB
ALBUMIN SERPL-MCNC: 4.2 G/DL (ref 3.5–5.5)
ALBUMIN/GLOB SERPL: 1.6 {RATIO} (ref 1.2–2.2)
ALP SERPL-CCNC: 65 IU/L (ref 39–117)
ALT SERPL-CCNC: 40 IU/L (ref 0–44)
AST SERPL-CCNC: 42 IU/L (ref 0–40)
BILIRUB SERPL-MCNC: 0.3 MG/DL (ref 0–1.2)
BUN SERPL-MCNC: 7 MG/DL (ref 6–24)
BUN/CREAT SERPL: 8 (ref 9–20)
CALCIUM SERPL-MCNC: 9.3 MG/DL (ref 8.7–10.2)
CHLORIDE SERPL-SCNC: 98 MMOL/L (ref 96–106)
CO2 SERPL-SCNC: 24 MMOL/L (ref 20–29)
CREAT SERPL-MCNC: 0.85 MG/DL (ref 0.76–1.27)
EST. AVERAGE GLUCOSE BLD GHB EST-MCNC: 183 MG/DL
GLOBULIN SER-MCNC: 2.6 G/DL (ref 1.5–4.5)
GLUCOSE SERPL-MCNC: 166 MG/DL (ref 65–99)
HBA1C MFR BLD: 8 % (ref 4.8–5.6)
LABCORP COMMENT: NORMAL
POTASSIUM SERPL-SCNC: 4.7 MMOL/L (ref 3.5–5.2)
PROT SERPL-MCNC: 6.8 G/DL (ref 6–8.5)
SL AMB EGFR AFRICAN AMERICAN: 121 ML/MIN/1.73
SL AMB EGFR NON AFRICAN AMERICAN: 104 ML/MIN/1.73
SODIUM SERPL-SCNC: 137 MMOL/L (ref 134–144)

## 2019-03-15 ENCOUNTER — TELEPHONE (OUTPATIENT)
Dept: ENDOCRINOLOGY | Facility: HOSPITAL | Age: 49
End: 2019-03-15

## 2019-03-15 ENCOUNTER — OFFICE VISIT (OUTPATIENT)
Dept: ENDOCRINOLOGY | Facility: HOSPITAL | Age: 49
End: 2019-03-15
Payer: COMMERCIAL

## 2019-03-15 VITALS
SYSTOLIC BLOOD PRESSURE: 120 MMHG | DIASTOLIC BLOOD PRESSURE: 80 MMHG | WEIGHT: 215.6 LBS | BODY MASS INDEX: 30.18 KG/M2 | HEIGHT: 71 IN | HEART RATE: 100 BPM

## 2019-03-15 DIAGNOSIS — R53.83 FATIGUE, UNSPECIFIED TYPE: ICD-10-CM

## 2019-03-15 DIAGNOSIS — E78.5 HYPERLIPIDEMIA, UNSPECIFIED HYPERLIPIDEMIA TYPE: Primary | ICD-10-CM

## 2019-03-15 DIAGNOSIS — I10 ESSENTIAL HYPERTENSION: ICD-10-CM

## 2019-03-15 DIAGNOSIS — E10.42 TYPE 1 DIABETES MELLITUS WITH DIABETIC POLYNEUROPATHY (HCC): ICD-10-CM

## 2019-03-15 DIAGNOSIS — Z96.41 INSULIN PUMP STATUS: ICD-10-CM

## 2019-03-15 PROCEDURE — 99214 OFFICE O/P EST MOD 30 MIN: CPT | Performed by: INTERNAL MEDICINE

## 2019-03-15 RX ORDER — GABAPENTIN 300 MG/1
CAPSULE ORAL
Qty: 60 CAPSULE | Refills: 5 | Status: SHIPPED | OUTPATIENT
Start: 2019-03-15 | End: 2019-09-30 | Stop reason: SDUPTHER

## 2019-03-15 NOTE — PROGRESS NOTES
3/15/2019    Assessment/Plan      Diagnoses and all orders for this visit:    Hyperlipidemia, unspecified hyperlipidemia type  -     Lipid Panel with Direct LDL reflex Lab Collect; Future    Essential hypertension  -     Comprehensive metabolic panel Lab Collect; Future    Type 1 diabetes mellitus with diabetic polyneuropathy (HCC)  -     TSH, 3rd generation Lab Collect  -     T4, free Lab Collect  -     Anti-microsomal antibody Lab Collect  -     Cortisol Level, AM Specimen- Lab Collect  -     CBC and differential- Lab Collect  -     gabapentin (NEURONTIN) 300 mg capsule; 2 tab bid  -     HEMOGLOBIN A1C W/ EAG ESTIMATION Lab Collect; Future  -     Microalbumin / creatinine urine ratio- Lab Collect; Future    Fatigue, unspecified type  -     TSH, 3rd generation Lab Collect  -     T4, free Lab Collect  -     Anti-microsomal antibody Lab Collect  -     Cortisol Level, AM Specimen- Lab Collect        Assessment/Plan:  1  Type 1 diabetes with insulin pump status:  He is noting frequent alarms and blood sugar entree requirements with the 670 G insulin pump  I gave him information  for saw for upgrade to minimize these alarms  The main benefit would be to stand auto mode more frequently  We will see him back in 3 months with labs as ordered above just prior  I asked him to call sooner if he needs me with our diabetes educator for any pump information in this regard  2  Hypertension:  Normotensive in the office today  3  Hyperlipidemia:  Continue simvastatin  Check lipids before next appointment  4  Fatigue:  Check TSH, free T4, TPO antibody, a m  Cortisol  CC: Diabetes Consult    History of Present Illness     HPI: Leon Izquierdo is a 57y o  year old male with type 1 diabetes for 20 years  He is on insulin at home and takes Novolog via insulin pump  He denies any polyuria, polydipsia, nocturia and blurry vision  He denies nephropathy but does admit to neuropathy and retinopathy        Hypoglycemic episodes: No not recently  Follows regularly with eye doctor Dr Quigley  The patient's last foot exam was in Dec 2018  Blood Sugar/Glucometer/Pump/CGM review: Insulin pump downloaded for review from  through 3/15  The patient is in auto mode 19% of the time  Average sensor glucose was 176 with a standard deviation of 48  Using med 69 units per day 54% or basal   There is a pattern of hyperglycemia after dinner  Blood sugars remain elevated overnight  For hypertension he is maintained on lisinopril and for hyperlipidemia he takes simvastatin  Review of Systems   Constitutional: Negative for fatigue  HENT: Negative for trouble swallowing and voice change  Eyes: Negative for visual disturbance  Respiratory: Negative for shortness of breath  Cardiovascular: Negative for palpitations and leg swelling  Gastrointestinal: Negative for abdominal pain, nausea and vomiting  Endocrine: Negative for polydipsia and polyuria  Musculoskeletal: Negative for arthralgias and myalgias  Skin: Negative for rash  Neurological: Negative for dizziness, tremors and weakness  Hematological: Negative for adenopathy  Psychiatric/Behavioral: Negative for agitation and confusion  Historical Information   History reviewed  No pertinent past medical history  History reviewed  No pertinent surgical history    Social History   Social History     Substance and Sexual Activity   Alcohol Use Yes    Comment: 1 drink daily     Social History     Substance and Sexual Activity   Drug Use No     Social History     Tobacco Use   Smoking Status Former Smoker    Packs/day: 0 50    Years: 30 00    Pack years: 15 00    Types: Cigarettes    Last attempt to quit: 2012    Years since quittin 2   Smokeless Tobacco Former User     Family History:   Family History   Problem Relation Age of Onset    No Known Problems Mother     Hyperlipidemia Father     Hypertension Father     Irritable bowel syndrome Father     No Known Problems Brother        Meds/Allergies   Current Outpatient Medications   Medication Sig Dispense Refill    buPROPion (WELLBUTRIN XL) 300 mg 24 hr tablet Take 300 mg by mouth daily  3    DYMISTA 137-50 MCG/ACT SUSP SPRAY 1 SPRAY IN EACH NOSTRIL BY INTRANASAL ROUTE 2 TIMES PER DAY  5    lisinopril (ZESTRIL) 20 mg tablet Take 1 tablet (20 mg total) by mouth daily 90 tablet 3    NOVOLOG 100 UNIT/ML injection Use up to 100 units daily via insulin pump 90 mL 1    simvastatin (ZOCOR) 40 mg tablet Take 1 tablet (40 mg total) by mouth daily at bedtime 90 tablet 3    gabapentin (NEURONTIN) 300 mg capsule 2 tab bid 60 capsule 5     No current facility-administered medications for this visit  Allergies   Allergen Reactions    Grass Extracts [Gramineae Pollens]     Tree Extract        Objective   Vitals: Blood pressure 120/80, pulse 100, height 5' 11" (1 803 m), weight 97 8 kg (215 lb 9 6 oz)  Invasive Devices          None          Physical Exam   Constitutional: He is oriented to person, place, and time  He appears well-developed and well-nourished  No distress  HENT:   Head: Normocephalic and atraumatic  Neck: Normal range of motion  Neck supple  No thyromegaly present  Cardiovascular: Normal rate and regular rhythm  Pulmonary/Chest: Effort normal and breath sounds normal    Abdominal: Soft  Bowel sounds are normal    Musculoskeletal: Normal range of motion  He exhibits no edema  Neurological: He is alert and oriented to person, place, and time  He exhibits normal muscle tone  Skin: Skin is warm and dry  No rash noted  He is not diaphoretic  Psychiatric: He has a normal mood and affect  His behavior is normal    Vitals reviewed  The history was obtained from the review of the chart and from the patient      Lab Results:    Most recent Alc is  Lab Results   Component Value Date    HGBA1C 8 0 (H) 03/08/2019           No components found for: HA1C  No components found for: GLU    Lab Results   Component Value Date    BUN 7 03/08/2019    K 4 7 03/08/2019    CL 98 03/08/2019    CO2 24 03/08/2019     No results found for: EGFR  No components found for: [facility]    Lab Results   Component Value Date    HDL 76 09/08/2018    TRIG 72 09/08/2018       Lab Results   Component Value Date    ALT 40 03/08/2019    AST 42 (H) 03/08/2019       Lab Results   Component Value Date    TSH 1 890 12/10/2018       Recent Results (from the past 42263 hour(s))   Comprehensive metabolic panel    Collection Time: 09/08/18  8:33 AM   Result Value Ref Range    Glucose, Random 199 (H) 65 - 99 mg/dL    BUN 8 6 - 24 mg/dL    Creatinine 0 90 0 76 - 1 27 mg/dL    eGFR Non African American 102 >59 mL/min/1 73    eGFR  118 >59 mL/min/1 73    SL AMB BUN/CREATININE RATIO 9 9 - 20    Sodium 138 134 - 144 mmol/L    Potassium 4 3 3 5 - 5 2 mmol/L    Chloride 98 96 - 106 mmol/L    CO2 23 20 - 29 mmol/L    CALCIUM 8 9 8 7 - 10 2 mg/dL    Protein, Total 6 8 6 0 - 8 5 g/dL    Albumin 4 2 3 5 - 5 5 g/dL    Globulin, Total 2 6 1 5 - 4 5 g/dL    Albumin/Globulin Ratio 1 6 1 2 - 2 2    TOTAL BILIRUBIN 0 2 0 0 - 1 2 mg/dL    Alk Phos Isoenzymes 61 39 - 117 IU/L    AST 31 0 - 40 IU/L    ALT 24 0 - 44 IU/L   Lipid Panel with Direct LDL reflex    Collection Time: 09/08/18  8:33 AM   Result Value Ref Range    Cholesterol, Total 170 100 - 199 mg/dL    Triglycerides 72 0 - 149 mg/dL    HDL 76 >39 mg/dL    VLDL Cholesterol Calculated 14 5 - 40 mg/dL    LDL Direct 80 0 - 99 mg/dL    LDl/HDL Ratio 1 1 0 0 - 3 6 ratio   Microalbumin / creatinine urine ratio    Collection Time: 09/08/18  8:33 AM   Result Value Ref Range    Creatinine, Urine 166 5 Not Estab  mg/dL    Microalbum  ,U,Random 36 8 Not Estab  ug/mL    Microalb/Creat Ratio 22 1 0 0 - 30 0 mg/g creat   Hemoglobin A1C    Collection Time: 09/08/18  8:33 AM   Result Value Ref Range    Hemoglobin A1C 5 6 4 8 - 5 6 %    Estimated Average Glucose 114 mg/dL   Ambig Abbrev Default    Collection Time: 09/08/18  8:33 AM   Result Value Ref Range    AMBIG ABBREV DEFAULT Comment    Comprehensive metabolic panel    Collection Time: 12/10/18  7:59 AM   Result Value Ref Range    Glucose, Random 131 (H) 65 - 99 mg/dL    BUN 10 6 - 24 mg/dL    Creatinine 1 02 0 76 - 1 27 mg/dL    eGFR Non  88 >59 mL/min/1 73    eGFR  101 >59 mL/min/1 73    SL AMB BUN/CREATININE RATIO 10 9 - 20    Sodium 142 134 - 144 mmol/L    Potassium 4 5 3 5 - 5 2 mmol/L    Chloride 100 96 - 106 mmol/L    CO2 26 20 - 29 mmol/L    CALCIUM 9 6 8 7 - 10 2 mg/dL    Protein, Total 7 1 6 0 - 8 5 g/dL    Albumin 4 5 3 5 - 5 5 g/dL    Globulin, Total 2 6 1 5 - 4 5 g/dL    Albumin/Globulin Ratio 1 7 1 2 - 2 2    TOTAL BILIRUBIN 0 2 0 0 - 1 2 mg/dL    Alk Phos Isoenzymes 63 39 - 117 IU/L    AST 29 0 - 40 IU/L    ALT 29 0 - 44 IU/L   Hemoglobin A1C    Collection Time: 12/10/18  7:59 AM   Result Value Ref Range    Hemoglobin A1C 7 8 (H) 4 8 - 5 6 %    Estimated Average Glucose 177 mg/dL   TSH, 3rd generation    Collection Time: 12/10/18  7:59 AM   Result Value Ref Range    TSH 1 890 0 450 - 4 500 uIU/mL   Specimen Status Report    Collection Time: 12/10/18  7:59 AM   Result Value Ref Range    Comment Comment    Comprehensive metabolic panel    Collection Time: 03/08/19  8:16 AM   Result Value Ref Range    Glucose, Random 166 (H) 65 - 99 mg/dL    BUN 7 6 - 24 mg/dL    Creatinine 0 85 0 76 - 1 27 mg/dL    eGFR Non African American 104 >59 mL/min/1 73    eGFR  121 >59 mL/min/1 73    SL AMB BUN/CREATININE RATIO 8 (L) 9 - 20    Sodium 137 134 - 144 mmol/L    Potassium 4 7 3 5 - 5 2 mmol/L    Chloride 98 96 - 106 mmol/L    CO2 24 20 - 29 mmol/L    CALCIUM 9 3 8 7 - 10 2 mg/dL    Protein, Total 6 8 6 0 - 8 5 g/dL    Albumin 4 2 3 5 - 5 5 g/dL    Globulin, Total 2 6 1 5 - 4 5 g/dL    Albumin/Globulin Ratio 1 6 1 2 - 2 2    TOTAL BILIRUBIN 0 3 0 0 - 1 2 mg/dL    Alk Phos Isoenzymes 65 39 - 117 IU/L    AST 42 (H) 0 - 40 IU/L    ALT 40 0 - 44 IU/L   Hemoglobin A1C    Collection Time: 03/08/19  8:16 AM   Result Value Ref Range    Hemoglobin A1C 8 0 (H) 4 8 - 5 6 %    Estimated Average Glucose 183 mg/dL   Specimen Status Report    Collection Time: 03/08/19  8:16 AM   Result Value Ref Range    Comment Comment          No future appointments  Portions of the record may have been created with voice recognition software  Occasional wrong word or "sound a like" substitutions may have occurred due to the inherent limitations of voice recognition software  Read the chart carefully and recognize, using context, where substitutions have occurred

## 2019-03-15 NOTE — TELEPHONE ENCOUNTER
Patient just wanted to let you know that he spoke with [employer]  You told him he could just do a software update (Mini 670G pump) & we could help him with it  Turns out that it's not a software issue, it's a replacement of the transmitter that attaches to you  He's taking care of it

## 2019-03-24 LAB
BASOPHILS # BLD AUTO: 0 X10E3/UL (ref 0–0.2)
BASOPHILS NFR BLD AUTO: 1 %
CORTIS AM PEAK SERPL-MCNC: 12.2 UG/DL (ref 6.2–19.4)
EOSINOPHIL # BLD AUTO: 0.4 X10E3/UL (ref 0–0.4)
EOSINOPHIL NFR BLD AUTO: 6 %
ERYTHROCYTE [DISTWIDTH] IN BLOOD BY AUTOMATED COUNT: 13.7 % (ref 12.3–15.4)
HCT VFR BLD AUTO: 42.5 % (ref 37.5–51)
HGB BLD-MCNC: 14.3 G/DL (ref 13–17.7)
IMM GRANULOCYTES # BLD: 0 X10E3/UL (ref 0–0.1)
IMM GRANULOCYTES NFR BLD: 0 %
LKM-1 AB SER-ACNC: <20.1 UNITS (ref 0–20)
LYMPHOCYTES # BLD AUTO: 2.8 X10E3/UL (ref 0.7–3.1)
LYMPHOCYTES NFR BLD AUTO: 42 %
MCH RBC QN AUTO: 33.1 PG (ref 26.6–33)
MCHC RBC AUTO-ENTMCNC: 33.6 G/DL (ref 31.5–35.7)
MCV RBC AUTO: 98 FL (ref 79–97)
MONOCYTES # BLD AUTO: 0.6 X10E3/UL (ref 0.1–0.9)
MONOCYTES NFR BLD AUTO: 8 %
NEUTROPHILS # BLD AUTO: 2.9 X10E3/UL (ref 1.4–7)
NEUTROPHILS NFR BLD AUTO: 43 %
PLATELET # BLD AUTO: 292 X10E3/UL (ref 150–379)
RBC # BLD AUTO: 4.32 X10E6/UL (ref 4.14–5.8)
T4 FREE SERPL-MCNC: 0.68 NG/DL (ref 0.82–1.77)
TSH SERPL DL<=0.005 MIU/L-ACNC: 1.35 UIU/ML (ref 0.45–4.5)
WBC # BLD AUTO: 6.8 X10E3/UL (ref 3.4–10.8)

## 2019-03-29 ENCOUNTER — TELEPHONE (OUTPATIENT)
Dept: ENDOCRINOLOGY | Facility: HOSPITAL | Age: 49
End: 2019-03-29

## 2019-03-29 DIAGNOSIS — E03.8 CENTRAL HYPOTHYROIDISM: Primary | ICD-10-CM

## 2019-03-29 NOTE — TELEPHONE ENCOUNTER
Cortisol level looked ok  His free T4 (one of the thyroid hormones was low)  The next step would be to repeat it with a more accurate measure which I will order

## 2019-05-09 LAB
T3FREE SERPL DIALY-MCNC: 2.49 PG/ML
T4 FREE SERPL DIALY-MCNC: 0.96 NG/DL
TSH SERPL DL<=0.005 MIU/L-ACNC: 0.59 UIU/ML (ref 0.45–4.5)

## 2019-05-21 DIAGNOSIS — E10.42 TYPE 1 DIABETES MELLITUS WITH DIABETIC POLYNEUROPATHY (HCC): Primary | ICD-10-CM

## 2019-05-21 RX ORDER — BLOOD SUGAR DIAGNOSTIC
STRIP MISCELLANEOUS
Qty: 400 EACH | Refills: 5 | Status: SHIPPED | OUTPATIENT
Start: 2019-05-21 | End: 2019-06-04 | Stop reason: CLARIF

## 2019-06-04 DIAGNOSIS — E10.42 TYPE 1 DIABETES MELLITUS WITH DIABETIC POLYNEUROPATHY (HCC): Primary | ICD-10-CM

## 2019-06-04 RX ORDER — BLOOD SUGAR DIAGNOSTIC
STRIP MISCELLANEOUS
Qty: 500 EACH | Refills: 5 | Status: SHIPPED | OUTPATIENT
Start: 2019-06-04 | End: 2020-06-05

## 2019-06-14 LAB
ALBUMIN SERPL-MCNC: 4 G/DL (ref 3.5–5.5)
ALBUMIN/CREAT UR: <2.8 MG/G CREAT (ref 0–30)
ALBUMIN/GLOB SERPL: 1.7 {RATIO} (ref 1.2–2.2)
ALP SERPL-CCNC: 68 IU/L (ref 39–117)
ALT SERPL-CCNC: 28 IU/L (ref 0–44)
AST SERPL-CCNC: 24 IU/L (ref 0–40)
BILIRUB SERPL-MCNC: 0.3 MG/DL (ref 0–1.2)
BUN SERPL-MCNC: 15 MG/DL (ref 6–24)
BUN/CREAT SERPL: 15 (ref 9–20)
CALCIUM SERPL-MCNC: 9.2 MG/DL (ref 8.7–10.2)
CHLORIDE SERPL-SCNC: 96 MMOL/L (ref 96–106)
CHOLEST SERPL-MCNC: 151 MG/DL (ref 100–199)
CO2 SERPL-SCNC: 28 MMOL/L (ref 20–29)
CREAT SERPL-MCNC: 1 MG/DL (ref 0.76–1.27)
CREAT UR-MCNC: 105.5 MG/DL
EST. AVERAGE GLUCOSE BLD GHB EST-MCNC: 192 MG/DL
GLOBULIN SER-MCNC: 2.4 G/DL (ref 1.5–4.5)
GLUCOSE SERPL-MCNC: 202 MG/DL (ref 65–99)
HBA1C MFR BLD: 8.3 % (ref 4.8–5.6)
HDLC SERPL-MCNC: 54 MG/DL
LABCORP COMMENT: NORMAL
LDLC SERPL CALC-MCNC: 80 MG/DL (ref 0–99)
LDLC/HDLC SERPL: 1.5 RATIO (ref 0–3.6)
MICROALBUMIN UR-MCNC: <3 UG/ML
POTASSIUM SERPL-SCNC: 4.5 MMOL/L (ref 3.5–5.2)
PROT SERPL-MCNC: 6.4 G/DL (ref 6–8.5)
SL AMB EGFR AFRICAN AMERICAN: 103 ML/MIN/1.73
SL AMB EGFR NON AFRICAN AMERICAN: 89 ML/MIN/1.73
SL AMB VLDL CHOLESTEROL CALC: 17 MG/DL (ref 5–40)
SODIUM SERPL-SCNC: 138 MMOL/L (ref 134–144)
TRIGL SERPL-MCNC: 86 MG/DL (ref 0–149)

## 2019-06-19 ENCOUNTER — OFFICE VISIT (OUTPATIENT)
Dept: ENDOCRINOLOGY | Facility: HOSPITAL | Age: 49
End: 2019-06-19
Payer: COMMERCIAL

## 2019-06-19 VITALS
WEIGHT: 216.8 LBS | HEART RATE: 108 BPM | HEIGHT: 71 IN | BODY MASS INDEX: 30.35 KG/M2 | DIASTOLIC BLOOD PRESSURE: 78 MMHG | SYSTOLIC BLOOD PRESSURE: 148 MMHG

## 2019-06-19 DIAGNOSIS — E10.42 TYPE 1 DIABETES MELLITUS WITH DIABETIC POLYNEUROPATHY (HCC): Primary | ICD-10-CM

## 2019-06-19 DIAGNOSIS — E78.5 HYPERLIPIDEMIA, UNSPECIFIED HYPERLIPIDEMIA TYPE: ICD-10-CM

## 2019-06-19 DIAGNOSIS — Z96.41 INSULIN PUMP STATUS: ICD-10-CM

## 2019-06-19 DIAGNOSIS — I10 ESSENTIAL HYPERTENSION: ICD-10-CM

## 2019-06-19 PROCEDURE — 99214 OFFICE O/P EST MOD 30 MIN: CPT | Performed by: INTERNAL MEDICINE

## 2019-06-19 RX ORDER — FLUTICASONE PROPIONATE 50 MCG
SPRAY, SUSPENSION (ML) NASAL
COMMUNITY
Start: 2019-05-17 | End: 2020-10-02 | Stop reason: ALTCHOICE

## 2019-07-21 DIAGNOSIS — E10.42 TYPE 1 DIABETES MELLITUS WITH DIABETIC POLYNEUROPATHY (HCC): ICD-10-CM

## 2019-07-31 ENCOUNTER — TELEPHONE (OUTPATIENT)
Dept: ENDOCRINOLOGY | Facility: HOSPITAL | Age: 49
End: 2019-07-31

## 2019-08-23 ENCOUNTER — TRANSCRIBE ORDERS (OUTPATIENT)
Dept: ADMINISTRATIVE | Facility: HOSPITAL | Age: 49
End: 2019-08-23

## 2019-08-23 DIAGNOSIS — J32.0 CHRONIC MAXILLARY SINUSITIS: Primary | ICD-10-CM

## 2019-08-30 ENCOUNTER — HOSPITAL ENCOUNTER (OUTPATIENT)
Dept: CT IMAGING | Facility: HOSPITAL | Age: 49
Discharge: HOME | End: 2019-08-30

## 2019-08-30 DIAGNOSIS — J32.0 CHRONIC MAXILLARY SINUSITIS: ICD-10-CM

## 2019-09-27 LAB
ALBUMIN SERPL-MCNC: 4.2 G/DL (ref 3.5–5.5)
ALBUMIN/GLOB SERPL: 1.5 {RATIO} (ref 1.2–2.2)
ALP SERPL-CCNC: 67 IU/L (ref 39–117)
ALT SERPL-CCNC: 30 IU/L (ref 0–44)
AST SERPL-CCNC: 34 IU/L (ref 0–40)
BILIRUB SERPL-MCNC: 0.3 MG/DL (ref 0–1.2)
BUN SERPL-MCNC: 10 MG/DL (ref 6–24)
BUN/CREAT SERPL: 11 (ref 9–20)
CALCIUM SERPL-MCNC: 9.3 MG/DL (ref 8.7–10.2)
CHLORIDE SERPL-SCNC: 101 MMOL/L (ref 96–106)
CO2 SERPL-SCNC: 25 MMOL/L (ref 20–29)
CREAT SERPL-MCNC: 0.92 MG/DL (ref 0.76–1.27)
EST. AVERAGE GLUCOSE BLD GHB EST-MCNC: 183 MG/DL
GLOBULIN SER-MCNC: 2.8 G/DL (ref 1.5–4.5)
GLUCOSE SERPL-MCNC: 209 MG/DL (ref 65–99)
HBA1C MFR BLD: 8 % (ref 4.8–5.6)
POTASSIUM SERPL-SCNC: 4.3 MMOL/L (ref 3.5–5.2)
PROT SERPL-MCNC: 7 G/DL (ref 6–8.5)
SL AMB EGFR AFRICAN AMERICAN: 114 ML/MIN/1.73
SL AMB EGFR NON AFRICAN AMERICAN: 99 ML/MIN/1.73
SODIUM SERPL-SCNC: 141 MMOL/L (ref 134–144)

## 2019-09-30 ENCOUNTER — OFFICE VISIT (OUTPATIENT)
Dept: ENDOCRINOLOGY | Facility: HOSPITAL | Age: 49
End: 2019-09-30
Payer: COMMERCIAL

## 2019-09-30 VITALS
SYSTOLIC BLOOD PRESSURE: 124 MMHG | DIASTOLIC BLOOD PRESSURE: 76 MMHG | BODY MASS INDEX: 31.14 KG/M2 | WEIGHT: 222.4 LBS | HEIGHT: 71 IN | HEART RATE: 100 BPM

## 2019-09-30 DIAGNOSIS — E78.5 HYPERLIPIDEMIA, UNSPECIFIED HYPERLIPIDEMIA TYPE: ICD-10-CM

## 2019-09-30 DIAGNOSIS — I10 ESSENTIAL HYPERTENSION: ICD-10-CM

## 2019-09-30 DIAGNOSIS — E10.42 TYPE 1 DIABETES MELLITUS WITH DIABETIC POLYNEUROPATHY (HCC): ICD-10-CM

## 2019-09-30 DIAGNOSIS — Z96.41 INSULIN PUMP STATUS: Primary | ICD-10-CM

## 2019-09-30 PROCEDURE — 99214 OFFICE O/P EST MOD 30 MIN: CPT | Performed by: INTERNAL MEDICINE

## 2019-09-30 PROCEDURE — 3074F SYST BP LT 130 MM HG: CPT | Performed by: INTERNAL MEDICINE

## 2019-09-30 PROCEDURE — 3078F DIAST BP <80 MM HG: CPT | Performed by: INTERNAL MEDICINE

## 2019-09-30 RX ORDER — GABAPENTIN 300 MG/1
CAPSULE ORAL
Qty: 360 CAPSULE | Refills: 3 | Status: SHIPPED | OUTPATIENT
Start: 2019-09-30 | End: 2020-01-31

## 2019-09-30 NOTE — PROGRESS NOTES
9/30/2019    Assessment/Plan      Diagnoses and all orders for this visit:    Insulin pump status    Hyperlipidemia, unspecified hyperlipidemia type  -     Lipid Panel with Direct LDL reflex; Future    Essential hypertension    Type 1 diabetes mellitus with diabetic polyneuropathy (HCC)  -     Hemoglobin A1C; Future  -     Comprehensive metabolic panel; Future  -     CBC and differential; Future  -     Microalbumin / creatinine urine ratio; Future  -     TSH, 3rd generation; Future  -     gabapentin (NEURONTIN) 300 mg capsule; 2 tab bid        Assessment/Plan:  1  Type 1 diabetes with Insulin pump status:  A1c slightly improved 8 0  Average sensor glucose and time in range improved since last insulin pump download  We discussed his hyperglycemia after breakfast time  He does not generally breakfast typically just has caffeinated tea  I did discuss that sometimes caffeine does increase blood sugars so he should try to units to see if this prevents hyperglycemia after this particular beverage in the morning  If that does not work try 3 units  He will be able to download his pump back [facility] at home and I asked him to call me if he does this so we can access his download  Follow-up in 3-4 months  Also, he did note some concerns regarding low alerts overnight so I suggested he consider using a temporary target overnight to see if this helps and if he is in manual mode we reduce his 12:00 a m  And 2:00 a m  Basal rates  Insulin pump settings will now be as follows:  Basal rates:  12:00 a m  1 25, 2:00 a m  1 55, 8:00 a m  2 5, 12:00 p m  1 6, 10:00 p m  1 3  Carb ratios:  12:00 a m  4 0, 6:00 p m  3 5, sensitivity 30, target 130      2  Hypertension:  His blood pressure is stable off of lisinopril as he has not been taking this recently  We will monitor off of it for now  3  Hyperlipidemia:  Continue simvastatin 40 mg daily  Check lipid panel before next appointment      4  Foot pain:  At the end of the visit he did notice some dorsal foot discomfort  I did not notice any redness, warmth, erythema  Good pulses and good capillary refill  I do not think this is related to neuropathy but he will continue on gabapentin  He states this happened a few years ago and resolved on its own  Asked him to call me if it does not get better and we will do an x-ray and perhaps consider podiatry referral       CC: Diabetes Consult    History of Present Illness     HPI: Loree William is a 54y o  year old male with type 1 diabetes for over 20 years  He is on insulin at home and takes Novolog via [employer] 670 g insulin pump  He denies any polyuria, polydipsia, nocturia and blurry vision  He denies nephropathy but does admit to neuropathy and retinopathy  Hypoglycemic episodes: No not recently  Follows with Dr Quigley for eye care regularly  The patient's last foot exam was in Dec 2018  Blood Sugar/Glucometer/Pump/CGM review: Insulin pump download from 9/17 through 09/30/2019 shows an average sensor glucose of 162 with a standard deviation of 42  Auto mode 79% of the time  Total daily doses 71 units  70% is basal   There is hyperglycemia occurring after breakfast time  For hyperlipidemia continues on simvastatin 40 mg daily  For hypertension continues on lisinopril 20 mg daily  Review of Systems   Constitutional: Negative for fatigue  HENT: Negative for trouble swallowing and voice change  Eyes: Negative for visual disturbance  Respiratory: Negative for shortness of breath  Cardiovascular: Negative for palpitations and leg swelling  Gastrointestinal: Negative for abdominal pain, nausea and vomiting  Endocrine: Negative for polydipsia and polyuria  Musculoskeletal: Negative for arthralgias and myalgias  Skin: Negative for rash  Neurological: Negative for dizziness, tremors and weakness  Hematological: Negative for adenopathy     Psychiatric/Behavioral: Negative for agitation and confusion  Historical Information   History reviewed  No pertinent past medical history  History reviewed  No pertinent surgical history  Social History   Social History     Substance and Sexual Activity   Alcohol Use Yes    Comment: 1 drink daily     Social History     Substance and Sexual Activity   Drug Use No     Social History     Tobacco Use   Smoking Status Former Smoker    Packs/day: 0 50    Years: 30 00    Pack years: 15 00    Types: Cigarettes    Last attempt to quit: 2012    Years since quittin 7   Smokeless Tobacco Former User     Family History:   Family History   Problem Relation Age of Onset    No Known Problems Mother     Hyperlipidemia Father     Hypertension Father     Irritable bowel syndrome Father     No Known Problems Brother        Meds/Allergies   Current Outpatient Medications   Medication Sig Dispense Refill    ACCU-CHEK GUIDE test strip Test 5 times daily 500 each 5    buPROPion (WELLBUTRIN XL) 300 mg 24 hr tablet Take 300 mg by mouth daily  3    DYMISTA 137-50 MCG/ACT SUSP SPRAY 1 SPRAY IN EACH NOSTRIL BY INTRANASAL ROUTE 2 TIMES PER DAY  5    gabapentin (NEURONTIN) 300 mg capsule 2 tab bid 360 capsule 3    NOVOLOG 100 UNIT/ML injection USE UP  UNITS DAILY VIA INSULIN PUMP 90 mL 1    simvastatin (ZOCOR) 40 mg tablet Take 1 tablet (40 mg total) by mouth daily at bedtime 90 tablet 3    fluticasone (FLONASE) 50 mcg/act nasal spray        No current facility-administered medications for this visit  Allergies   Allergen Reactions    Grass Extracts [Gramineae Pollens]     Tree Extract        Objective   Vitals: Blood pressure 124/76, pulse 100, height 5' 11" (1 803 m), weight 101 kg (222 lb 6 4 oz)  Invasive Devices     None                 Physical Exam   Constitutional: He is oriented to person, place, and time  He appears well-developed and well-nourished  No distress  HENT:   Head: Normocephalic and atraumatic     Neck: Normal range of motion  Neck supple  No thyromegaly present  Cardiovascular: Normal rate and regular rhythm  Pulmonary/Chest: Effort normal and breath sounds normal  No respiratory distress  Abdominal: Soft  Bowel sounds are normal    Musculoskeletal: Normal range of motion  He exhibits no edema  Neurological: He is alert and oriented to person, place, and time  He exhibits normal muscle tone  Skin: Skin is warm and dry  No rash noted  He is not diaphoretic  Psychiatric: He has a normal mood and affect  His behavior is normal    Vitals reviewed  The history was obtained from the review of the chart and from the patient      Lab Results:    Most recent Alc is  Lab Results   Component Value Date    HGBA1C 8 0 (H) 09/26/2019           No components found for: HA1C  No components found for: GLU    Lab Results   Component Value Date    CREATININE 0 92 09/26/2019    CREATININE 1 00 06/13/2019    CREATININE 0 85 03/08/2019    BUN 10 09/26/2019    K 4 3 09/26/2019     09/26/2019    CO2 25 09/26/2019     No results found for: EGFR  No components found for: [facility]    Lab Results   Component Value Date    HDL 54 06/13/2019    TRIG 86 06/13/2019       Lab Results   Component Value Date    ALT 30 09/26/2019    AST 34 09/26/2019       Lab Results   Component Value Date    TSH 0 586 05/03/2019    FREET4 0 68 (L) 03/22/2019       Recent Results (from the past 79862 hour(s))   Comprehensive metabolic panel    Collection Time: 09/08/18  8:33 AM   Result Value Ref Range    Glucose, Random 199 (H) 65 - 99 mg/dL    BUN 8 6 - 24 mg/dL    Creatinine 0 90 0 76 - 1 27 mg/dL    eGFR Non African American 102 >59 mL/min/1 73    eGFR  118 >59 mL/min/1 73    SL AMB BUN/CREATININE RATIO 9 9 - 20    Sodium 138 134 - 144 mmol/L    Potassium 4 3 3 5 - 5 2 mmol/L    Chloride 98 96 - 106 mmol/L    CO2 23 20 - 29 mmol/L    CALCIUM 8 9 8 7 - 10 2 mg/dL    Protein, Total 6 8 6 0 - 8 5 g/dL    Albumin 4 2 3 5 - 5 5 g/dL Globulin, Total 2 6 1 5 - 4 5 g/dL    Albumin/Globulin Ratio 1 6 1 2 - 2 2    TOTAL BILIRUBIN 0 2 0 0 - 1 2 mg/dL    Alk Phos Isoenzymes 61 39 - 117 IU/L    AST 31 0 - 40 IU/L    ALT 24 0 - 44 IU/L   Lipid Panel with Direct LDL reflex    Collection Time: 09/08/18  8:33 AM   Result Value Ref Range    Cholesterol, Total 170 100 - 199 mg/dL    Triglycerides 72 0 - 149 mg/dL    HDL 76 >39 mg/dL    VLDL Cholesterol Calculated 14 5 - 40 mg/dL    LDL Direct 80 0 - 99 mg/dL    LDl/HDL Ratio 1 1 0 0 - 3 6 ratio   Microalbumin / creatinine urine ratio    Collection Time: 09/08/18  8:33 AM   Result Value Ref Range    Creatinine, Urine 166 5 Not Estab  mg/dL    Microalbum  ,U,Random 36 8 Not Estab  ug/mL    Microalb/Creat Ratio 22 1 0 0 - 30 0 mg/g creat   Hemoglobin A1C    Collection Time: 09/08/18  8:33 AM   Result Value Ref Range    Hemoglobin A1C 5 6 4 8 - 5 6 %    Estimated Average Glucose 114 mg/dL   Ambig Abbrev Default    Collection Time: 09/08/18  8:33 AM   Result Value Ref Range    AMBIG ABBREV DEFAULT Comment    Comprehensive metabolic panel    Collection Time: 12/10/18  7:59 AM   Result Value Ref Range    Glucose, Random 131 (H) 65 - 99 mg/dL    BUN 10 6 - 24 mg/dL    Creatinine 1 02 0 76 - 1 27 mg/dL    eGFR Non  88 >59 mL/min/1 73    eGFR  101 >59 mL/min/1 73    SL AMB BUN/CREATININE RATIO 10 9 - 20    Sodium 142 134 - 144 mmol/L    Potassium 4 5 3 5 - 5 2 mmol/L    Chloride 100 96 - 106 mmol/L    CO2 26 20 - 29 mmol/L    CALCIUM 9 6 8 7 - 10 2 mg/dL    Protein, Total 7 1 6 0 - 8 5 g/dL    Albumin 4 5 3 5 - 5 5 g/dL    Globulin, Total 2 6 1 5 - 4 5 g/dL    Albumin/Globulin Ratio 1 7 1 2 - 2 2    TOTAL BILIRUBIN 0 2 0 0 - 1 2 mg/dL    Alk Phos Isoenzymes 63 39 - 117 IU/L    AST 29 0 - 40 IU/L    ALT 29 0 - 44 IU/L   Hemoglobin A1C    Collection Time: 12/10/18  7:59 AM   Result Value Ref Range    Hemoglobin A1C 7 8 (H) 4 8 - 5 6 %    Estimated Average Glucose 177 mg/dL   TSH, 3rd generation    Collection Time: 12/10/18  7:59 AM   Result Value Ref Range    TSH 1 890 0 450 - 4 500 uIU/mL   Specimen Status Report    Collection Time: 12/10/18  7:59 AM   Result Value Ref Range    Comment Comment    Comprehensive metabolic panel    Collection Time: 03/08/19  8:16 AM   Result Value Ref Range    Glucose, Random 166 (H) 65 - 99 mg/dL    BUN 7 6 - 24 mg/dL    Creatinine 0 85 0 76 - 1 27 mg/dL    eGFR Non African American 104 >59 mL/min/1 73    eGFR  121 >59 mL/min/1 73    SL AMB BUN/CREATININE RATIO 8 (L) 9 - 20    Sodium 137 134 - 144 mmol/L    Potassium 4 7 3 5 - 5 2 mmol/L    Chloride 98 96 - 106 mmol/L    CO2 24 20 - 29 mmol/L    CALCIUM 9 3 8 7 - 10 2 mg/dL    Protein, Total 6 8 6 0 - 8 5 g/dL    Albumin 4 2 3 5 - 5 5 g/dL    Globulin, Total 2 6 1 5 - 4 5 g/dL    Albumin/Globulin Ratio 1 6 1 2 - 2 2    TOTAL BILIRUBIN 0 3 0 0 - 1 2 mg/dL    Alk Phos Isoenzymes 65 39 - 117 IU/L    AST 42 (H) 0 - 40 IU/L    ALT 40 0 - 44 IU/L   Hemoglobin A1C    Collection Time: 03/08/19  8:16 AM   Result Value Ref Range    Hemoglobin A1C 8 0 (H) 4 8 - 5 6 %    Estimated Average Glucose 183 mg/dL   Specimen Status Report    Collection Time: 03/08/19  8:16 AM   Result Value Ref Range    Comment Comment    Tish Burgos Default    Collection Time: 03/22/19  7:52 AM   Result Value Ref Range    White Blood Cell Count 6 8 3 4 - 10 8 x10E3/uL    Red Blood Cell Count 4 32 4 14 - 5 80 x10E6/uL    Hemoglobin 14 3 13 0 - 17 7 g/dL    HCT 42 5 37 5 - 51 0 %    MCV 98 (H) 79 - 97 fL    MCH 33 1 (H) 26 6 - 33 0 pg    MCHC 33 6 31 5 - 35 7 g/dL    RDW 13 7 12 3 - 15 4 %    Platelet Count 410 272 - 379 x10E3/uL    Neutrophils 43 Not Estab  %    Lymphocytes 42 Not Estab  %    Monocytes 8 Not Estab  %    Eosinophils 6 Not Estab  %    Basophils PCT 1 Not Estab  %    Neutrophils (Absolute) 2 9 1 4 - 7 0 x10E3/uL    Lymphocytes (Absolute) 2 8 0 7 - 3 1 x10E3/uL    Monocytes (Absolute) 0 6 0 1 - 0 9 x10E3/uL Eosinophils (Absolute) 0 4 0 0 - 0 4 x10E3/uL    Basophils ABS 0 0 0 0 - 0 2 x10E3/uL    Immature Granulocytes 0 Not Estab  %    Immature Granulocytes (Absolute) 0 0 0 0 - 0 1 x10E3/uL   T4, free    Collection Time: 03/22/19  7:52 AM   Result Value Ref Range    Free t4 0 68 (L) 0 82 - 1 77 ng/dL   TSH, 3rd generation    Collection Time: 03/22/19  7:52 AM   Result Value Ref Range    TSH 1 350 0 450 - 4 500 uIU/mL   Liver/Kidney Microsomal Antibody    Collection Time: 03/22/19  7:52 AM   Result Value Ref Range    Liver-Kidney Microsomal Ab <20 1 0 0 - 20 0 Units   Cortisol Level, AM Specimen    Collection Time: 03/22/19  7:52 AM   Result Value Ref Range    Cortisol AM 12 2 6 2 - 19 4 ug/dL   Free T4 by Dialysis/Mass Spec    Collection Time: 05/03/19  1:50 PM   Result Value Ref Range    Free T4 by Dialysis/Mass Spec 0 96 ng/dL   T3, Free, Dialysis, LC/MS-MS    Collection Time: 05/03/19  1:50 PM   Result Value Ref Range    T3, Free, Dialysis, LC/MS-MS 2 49 pg/mL   TSH, 3rd generation    Collection Time: 05/03/19  1:50 PM   Result Value Ref Range    TSH 0 586 0 450 - 4 500 uIU/mL   Comprehensive metabolic panel    Collection Time: 06/13/19  8:32 AM   Result Value Ref Range    Glucose, Random 202 (H) 65 - 99 mg/dL    BUN 15 6 - 24 mg/dL    Creatinine 1 00 0 76 - 1 27 mg/dL    eGFR Non  89 >59 mL/min/1 73    eGFR  103 >59 mL/min/1 73    SL AMB BUN/CREATININE RATIO 15 9 - 20    Sodium 138 134 - 144 mmol/L    Potassium 4 5 3 5 - 5 2 mmol/L    Chloride 96 96 - 106 mmol/L    CO2 28 20 - 29 mmol/L    CALCIUM 9 2 8 7 - 10 2 mg/dL    Protein, Total 6 4 6 0 - 8 5 g/dL    Albumin 4 0 3 5 - 5 5 g/dL    Globulin, Total 2 4 1 5 - 4 5 g/dL    Albumin/Globulin Ratio 1 7 1 2 - 2 2    TOTAL BILIRUBIN 0 3 0 0 - 1 2 mg/dL    Alk Phos Isoenzymes 68 39 - 117 IU/L    AST 24 0 - 40 IU/L    ALT 28 0 - 44 IU/L   Lipid Panel with Direct LDL reflex    Collection Time: 06/13/19  8:32 AM   Result Value Ref Range Cholesterol, Total 151 100 - 199 mg/dL    Triglycerides 86 0 - 149 mg/dL    HDL 54 >39 mg/dL    VLDL Cholesterol Calculated 17 5 - 40 mg/dL    LDL Direct 80 0 - 99 mg/dL    LDl/HDL Ratio 1 5 0 0 - 3 6 ratio   Microalbumin / creatinine urine ratio    Collection Time: 06/13/19  8:32 AM   Result Value Ref Range    Creatinine, Urine 105 5 Not Estab  mg/dL    Microalbum  ,U,Random <3 0 Not Estab  ug/mL    Microalb/Creat Ratio <2 8 0 0 - 30 0 mg/g creat   Hemoglobin A1C    Collection Time: 06/13/19  8:32 AM   Result Value Ref Range    Hemoglobin A1C 8 3 (H) 4 8 - 5 6 %    Estimated Average Glucose 192 mg/dL   Specimen Status Report    Collection Time: 06/13/19  8:32 AM   Result Value Ref Range    Comment Comment    Comprehensive metabolic panel    Collection Time: 09/26/19  8:58 AM   Result Value Ref Range    Glucose, Random 209 (H) 65 - 99 mg/dL    BUN 10 6 - 24 mg/dL    Creatinine 0 92 0 76 - 1 27 mg/dL    eGFR Non African American 99 >59 mL/min/1 73    eGFR  114 >59 mL/min/1 73    SL AMB BUN/CREATININE RATIO 11 9 - 20    Sodium 141 134 - 144 mmol/L    Potassium 4 3 3 5 - 5 2 mmol/L    Chloride 101 96 - 106 mmol/L    CO2 25 20 - 29 mmol/L    CALCIUM 9 3 8 7 - 10 2 mg/dL    Protein, Total 7 0 6 0 - 8 5 g/dL    Albumin 4 2 3 5 - 5 5 g/dL    Globulin, Total 2 8 1 5 - 4 5 g/dL    Albumin/Globulin Ratio 1 5 1 2 - 2 2    TOTAL BILIRUBIN 0 3 0 0 - 1 2 mg/dL    Alk Phos Isoenzymes 67 39 - 117 IU/L    AST 34 0 - 40 IU/L    ALT 30 0 - 44 IU/L   Hemoglobin A1C    Collection Time: 09/26/19  8:58 AM   Result Value Ref Range    Hemoglobin A1C 8 0 (H) 4 8 - 5 6 %    Estimated Average Glucose 183 mg/dL         No future appointments  Portions of the record may have been created with voice recognition software  Occasional wrong word or "sound a like" substitutions may have occurred due to the inherent limitations of voice recognition software   Read the chart carefully and recognize, using context, where substitutions have occurred

## 2019-10-01 ENCOUNTER — TELEPHONE (OUTPATIENT)
Dept: ENDOCRINOLOGY | Facility: HOSPITAL | Age: 49
End: 2019-10-01

## 2019-10-17 DIAGNOSIS — E78.5 HYPERLIPIDEMIA, UNSPECIFIED HYPERLIPIDEMIA TYPE: ICD-10-CM

## 2019-10-17 RX ORDER — SIMVASTATIN 40 MG
40 TABLET ORAL
Qty: 90 TABLET | Refills: 3 | Status: SHIPPED | OUTPATIENT
Start: 2019-10-17 | End: 2020-10-15

## 2019-11-01 DIAGNOSIS — I10 ESSENTIAL HYPERTENSION: ICD-10-CM

## 2019-11-01 RX ORDER — LISINOPRIL 20 MG/1
TABLET ORAL
Qty: 90 TABLET | Refills: 3 | OUTPATIENT
Start: 2019-11-01

## 2020-01-08 LAB
ALBUMIN SERPL-MCNC: 4.3 G/DL (ref 3.5–5.5)
ALBUMIN/CREAT UR: 11.4 MG/G CREAT (ref 0–30)
ALBUMIN/GLOB SERPL: 1.5 {RATIO} (ref 1.2–2.2)
ALP SERPL-CCNC: 68 IU/L (ref 39–117)
ALT SERPL-CCNC: 36 IU/L (ref 0–44)
AST SERPL-CCNC: 33 IU/L (ref 0–40)
BASOPHILS # BLD AUTO: 0.1 X10E3/UL (ref 0–0.2)
BASOPHILS NFR BLD AUTO: 1 %
BILIRUB SERPL-MCNC: 0.5 MG/DL (ref 0–1.2)
BUN SERPL-MCNC: 12 MG/DL (ref 6–24)
BUN/CREAT SERPL: 12 (ref 9–20)
CALCIUM SERPL-MCNC: 9.3 MG/DL (ref 8.7–10.2)
CHLORIDE SERPL-SCNC: 97 MMOL/L (ref 96–106)
CHOLEST SERPL-MCNC: 230 MG/DL (ref 100–199)
CHOLEST/HDLC SERPL: 3.7 RATIO (ref 0–5)
CO2 SERPL-SCNC: 22 MMOL/L (ref 20–29)
CREAT SERPL-MCNC: 1 MG/DL (ref 0.76–1.27)
CREAT UR-MCNC: 234.4 MG/DL
EOSINOPHIL # BLD AUTO: 0.3 X10E3/UL (ref 0–0.4)
EOSINOPHIL NFR BLD AUTO: 4 %
ERYTHROCYTE [DISTWIDTH] IN BLOOD BY AUTOMATED COUNT: 14.2 % (ref 11.6–15.4)
GLOBULIN SER-MCNC: 2.8 G/DL (ref 1.5–4.5)
GLUCOSE SERPL-MCNC: 185 MG/DL (ref 65–99)
HBA1C MFR BLD: 8.3 % (ref 4.8–5.6)
HCT VFR BLD AUTO: 44.7 % (ref 37.5–51)
HDLC SERPL-MCNC: 63 MG/DL
HGB BLD-MCNC: 15 G/DL (ref 13–17.7)
IMM GRANULOCYTES # BLD: 0.1 X10E3/UL (ref 0–0.1)
IMM GRANULOCYTES NFR BLD: 1 %
LDLC SERPL DIRECT ASSAY-MCNC: 146 MG/DL (ref 0–99)
LYMPHOCYTES # BLD AUTO: 3.2 X10E3/UL (ref 0.7–3.1)
LYMPHOCYTES NFR BLD AUTO: 34 %
MCH RBC QN AUTO: 33.4 PG (ref 26.6–33)
MCHC RBC AUTO-ENTMCNC: 33.6 G/DL (ref 31.5–35.7)
MCV RBC AUTO: 100 FL (ref 79–97)
MICROALBUMIN UR-MCNC: 26.8 UG/ML
MONOCYTES # BLD AUTO: 0.6 X10E3/UL (ref 0.1–0.9)
MONOCYTES NFR BLD AUTO: 7 %
NEUTROPHILS # BLD AUTO: 5 X10E3/UL (ref 1.4–7)
NEUTROPHILS NFR BLD AUTO: 53 %
PLATELET # BLD AUTO: 149 X10E3/UL (ref 150–450)
POTASSIUM SERPL-SCNC: 4.7 MMOL/L (ref 3.5–5.2)
PROT SERPL-MCNC: 7.1 G/DL (ref 6–8.5)
RBC # BLD AUTO: 4.49 X10E6/UL (ref 4.14–5.8)
SL AMB EGFR AFRICAN AMERICAN: 103 ML/MIN/1.73
SL AMB EGFR NON AFRICAN AMERICAN: 89 ML/MIN/1.73
SODIUM SERPL-SCNC: 136 MMOL/L (ref 134–144)
TRIGL SERPL-MCNC: 105 MG/DL (ref 0–149)
TSH SERPL DL<=0.005 MIU/L-ACNC: 1.43 UIU/ML (ref 0.45–4.5)
WBC # BLD AUTO: 9.2 X10E3/UL (ref 3.4–10.8)

## 2020-01-14 DIAGNOSIS — E10.42 TYPE 1 DIABETES MELLITUS WITH DIABETIC POLYNEUROPATHY (HCC): ICD-10-CM

## 2020-01-31 ENCOUNTER — OFFICE VISIT (OUTPATIENT)
Dept: ENDOCRINOLOGY | Facility: HOSPITAL | Age: 50
End: 2020-01-31
Payer: COMMERCIAL

## 2020-01-31 VITALS
DIASTOLIC BLOOD PRESSURE: 96 MMHG | SYSTOLIC BLOOD PRESSURE: 138 MMHG | HEIGHT: 71 IN | HEART RATE: 92 BPM | WEIGHT: 227.4 LBS | BODY MASS INDEX: 31.84 KG/M2

## 2020-01-31 DIAGNOSIS — E10.42 TYPE 1 DIABETES MELLITUS WITH DIABETIC POLYNEUROPATHY (HCC): Primary | ICD-10-CM

## 2020-01-31 DIAGNOSIS — G62.9 PERIPHERAL POLYNEUROPATHY: ICD-10-CM

## 2020-01-31 DIAGNOSIS — E78.5 HYPERLIPIDEMIA, UNSPECIFIED HYPERLIPIDEMIA TYPE: ICD-10-CM

## 2020-01-31 DIAGNOSIS — I10 ESSENTIAL HYPERTENSION: ICD-10-CM

## 2020-01-31 DIAGNOSIS — Z96.41 INSULIN PUMP STATUS: ICD-10-CM

## 2020-01-31 PROCEDURE — 99214 OFFICE O/P EST MOD 30 MIN: CPT | Performed by: STUDENT IN AN ORGANIZED HEALTH CARE EDUCATION/TRAINING PROGRAM

## 2020-01-31 RX ORDER — DULOXETIN HYDROCHLORIDE 60 MG/1
60 CAPSULE, DELAYED RELEASE ORAL DAILY
Qty: 30 CAPSULE | Refills: 5 | Status: SHIPPED | OUTPATIENT
Start: 2020-01-31 | End: 2020-07-24

## 2020-01-31 NOTE — ASSESSMENT & PLAN NOTE
Lab Results   Component Value Date    HGBA1C 8 3 (H) 01/07/2020     A1c increased to 8 3% from 8% previously  Patient was recently on course of steroids for sinus congestion, which could be contributing to hyperglycemia  Insulin pump download reviewed  Given effects on glycemic control from recent steroid use for sinus congestion, we will hold off on making any changes at this time  We will repeat labs in 3 months prior to next follow-up visit  For his neuropathy, we will discontinue gabapentin and try duloxetine   He has failed Lyrica in the past     Current settings:  Basal rate  · 0000 - 1 25 units/hr  · 0200 - 1 55 units/hr  · 0800 - 2 50 units/hr  · 1200 - 1 60 units/hr  · 2200 - 1 30 units/hr  Carb ratios  · 0:00 - 4  · 18:00 -3 5  Insulin sensitivity 30  BG target 130

## 2020-01-31 NOTE — ASSESSMENT & PLAN NOTE
Lipid panel reviewed and noted to have elevated total cholesterol and LDL  10-year ASCVD calculated at 2 7%  Continue simvastatin 40 mg daily

## 2020-01-31 NOTE — PROGRESS NOTES
[id number] [facility]  ENDOCRINOLOGY OFFICE VISIT     PATIENT INFORMATION     Navjot Licona   54 y o  male   MRN: [id number]    ASSESSMENT/PLAN     Problem List Items Addressed This Visit        Endocrine    Type 1 diabetes mellitus with diabetic polyneuropathy ([facility] [address] ) - Primary       Lab Results   Component Value Date    HGBA1C 8 3 (H) 01/07/2020     A1c increased to 8 3% from 8% previously  Patient was recently on course of steroids for sinus congestion, which could be contributing to hyperglycemia  Insulin pump download reviewed  Given effects on glycemic control from recent steroid use for sinus congestion, we will hold off on making any changes at this time  We will repeat labs in 3 months prior to next follow-up visit  For his neuropathy, we will discontinue gabapentin and try duloxetine  He has failed Lyrica in the past     Current settings:  Basal rate  · 0000 - 1 25 units/hr  · 0200 - 1 55 units/hr  · 0800 - 2 50 units/hr  · 1200 - 1 60 units/hr  · 2200 - 1 30 units/hr  Carb ratios  · 0:00 - 4  · 18:00 -3 5  Insulin sensitivity 30  BG target 130         Relevant Orders    CBC and differential    Comprehensive metabolic panel    Hemoglobin A1C       Cardiovascular and Mediastinum    Essential hypertension     Blood pressure is slightly elevated at 138/96  Off lisinopril  Relevant Orders    CBC and differential    Comprehensive metabolic panel    Hemoglobin A1C       Other    Hyperlipidemia     Lipid panel reviewed and noted to have elevated total cholesterol and LDL  10-year ASCVD calculated at 2 7%  Continue simvastatin 40 mg daily           Relevant Orders    CBC and differential    Comprehensive metabolic panel    Hemoglobin A1C    Insulin pump status    Relevant Orders    CBC and differential    Comprehensive metabolic panel    Hemoglobin A1C      Other Visit Diagnoses     Peripheral polyneuropathy        Relevant Medications    DULoxetine (CYMBALTA) 60 mg delayed release capsule Schedule a follow-up appointment in 3 months  HEALTH MAINTENANCE     There is no immunization history on file for this patient  CHIEF COMPLAINT     Follow-up     HISTORY OF PRESENT ILLNESS     74-year-old male presents to office for follow-up  Patient has had type 1 diabetes mellitus for over 20 years and takes Novolog via Medtronic 670 g insulin pump  He denies any polyuria, polydipsia, nocturia and blurry vision  He has not had any hypoglycemic episodes  He edith admit to recent sinus infection with congestion  He was treated with antibiotic course as well as 5-day course of steroids x2  He believes this has contributed to his sugars being high      For hyperlipidemia continues on simvastatin 40 mg daily  For hypertension, he was previously on lisinopril 20 mg daily but is off therapy currently  REVIEW OF SYSTEMS     Review of Systems   Constitutional: Positive for fatigue  Negative for appetite change, chills, fever and unexpected weight change  HENT: Positive for congestion and sinus pressure  Negative for sore throat, trouble swallowing and voice change  Eyes: Negative for visual disturbance  Respiratory: Positive for cough  Negative for shortness of breath  Cardiovascular: Negative for chest pain and palpitations  Gastrointestinal: Negative for abdominal pain, constipation, diarrhea and nausea  Endocrine: Negative for cold intolerance and heat intolerance  Musculoskeletal: Negative for arthralgias  Skin: Negative for rash  Neurological: Negative for dizziness, weakness and headaches  Hematological: Negative for adenopathy  OBJECTIVE     Vitals:    01/31/20 0753   BP: 138/96   Pulse: 92   Weight: 103 kg (227 lb 6 4 oz)   Height: 5' 11" (1 803 m)     Physical Exam   Constitutional: He is oriented to person, place, and time  He appears well-developed and well-nourished  No distress  HENT:   Head: Normocephalic and atraumatic     Nose: Nose normal    Mouth/Throat: No oropharyngeal exudate  Eyes: Conjunctivae and EOM are normal  No scleral icterus  Neck: Neck supple  No JVD present  No tracheal deviation present  Cardiovascular: Normal rate, regular rhythm and normal heart sounds  Pulmonary/Chest: Effort normal and breath sounds normal  No respiratory distress  Abdominal: Soft  Bowel sounds are normal  He exhibits no distension  There is no tenderness  There is no rebound and no guarding  Musculoskeletal: He exhibits no edema or deformity  Neurological: He is alert and oriented to person, place, and time  No cranial nerve deficit  Skin: Skin is warm and dry  He is not diaphoretic  Psychiatric: He has a normal mood and affect  His behavior is normal  Judgment and thought content normal    Nursing note and vitals reviewed  CURRENT MEDICATIONS     Current Outpatient Medications:     ACCU-CHEK GUIDE test strip, Test 5 times daily, Disp: 500 each, Rfl: 5    Albuterol Sulfate 108 (90 Base) MCG/ACT AEPB, Inhale, Disp: , Rfl:     buPROPion (WELLBUTRIN XL) 300 mg 24 hr tablet, Take 300 mg by mouth daily, Disp: , Rfl: 3    DYMISTA 137-50 MCG/ACT SUSP, SPRAY 1 SPRAY IN EACH NOSTRIL BY INTRANASAL ROUTE 2 TIMES PER DAY, Disp: , Rfl: 5    NOVOLOG 100 UNIT/ML injection, USE UP  UNITS DAILY VIA INSULIN PUMP, Disp: 90 mL, Rfl: 0    simvastatin (ZOCOR) 40 mg tablet, TAKE 1 TABLET (40 MG TOTAL) BY MOUTH DAILY AT BEDTIME, Disp: 90 tablet, Rfl: 3    DULoxetine (CYMBALTA) 60 mg delayed release capsule, Take 1 capsule (60 mg total) by mouth daily, Disp: 30 capsule, Rfl: 5    fluticasone (FLONASE) 50 mcg/act nasal spray, , Disp: , Rfl:     PAST MEDICAL & SURGICAL HISTORY   History reviewed  No pertinent past medical history  History reviewed  No pertinent surgical history    SOCIAL & FAMILY HISTORY     Social History     Socioeconomic History    Marital status: /Civil Union     Spouse name: Not on file    Number of children: Not on file    Years of education: Not on file    Highest education level: Not on file   Occupational History    Not on file   Social Needs    Financial resource strain: Not on file    Food insecurity:     Worry: Not on file     Inability: Not on file    Transportation needs:     Medical: Not on file     Non-medical: Not on file   Tobacco Use    Smoking status: Former Smoker     Packs/day: 0 50     Years: 30 00     Pack years: 15 00     Types: Cigarettes     Last attempt to quit: 2012     Years since quittin 1    Smokeless tobacco: Former User   Substance and Sexual Activity    Alcohol use: Yes     Comment: 1 drink daily    Drug use: No    Sexual activity: Not on file   Lifestyle    Physical activity:     Days per week: Not on file     Minutes per session: Not on file    Stress: Not on file   Relationships    Social connections:     Talks on phone: Not on file     Gets together: Not on file     Attends Orthodoxy service: Not on file     Active member of club or organization: Not on file     Attends meetings of clubs or organizations: Not on file     Relationship status: Not on file    Intimate partner violence:     Fear of current or ex partner: Not on file     Emotionally abused: Not on file     Physically abused: Not on file     Forced sexual activity: Not on file   Other Topics Concern    Not on file   Social History Narrative    Not on file     Social History     Substance and Sexual Activity   Alcohol Use Yes    Comment: 1 drink daily       Social History     Substance and Sexual Activity   Drug Use No     Social History     Tobacco Use   Smoking Status Former Smoker    Packs/day: 0 50    Years: 30 00    Pack years: 15 00    Types: Cigarettes    Last attempt to quit: 2012    Years since quittin 1   Smokeless Tobacco Former User     Family History   Problem Relation Age of Onset    No Known Problems Mother     Hyperlipidemia Father     Hypertension Father     Irritable bowel syndrome Father     No Known Problems Brother      ==  Jaquan Ashley DO  Chief Resident, PGY-3  [facility] [address] [facility] [address]  [address] [facility] [place] , [address] [address] [facility] [facility]  Office: [phone]  Fax: [phone]

## 2020-04-16 ENCOUNTER — DOCUMENTATION (OUTPATIENT)
Dept: ENDOCRINOLOGY | Facility: HOSPITAL | Age: 50
End: 2020-04-16

## 2020-04-30 ENCOUNTER — DOCUMENTATION (OUTPATIENT)
Dept: ENDOCRINOLOGY | Facility: HOSPITAL | Age: 50
End: 2020-04-30

## 2020-05-09 DIAGNOSIS — E10.42 TYPE 1 DIABETES MELLITUS WITH DIABETIC POLYNEUROPATHY (HCC): ICD-10-CM

## 2020-06-05 DIAGNOSIS — E10.42 TYPE 1 DIABETES MELLITUS WITH DIABETIC POLYNEUROPATHY (HCC): ICD-10-CM

## 2020-06-05 RX ORDER — BLOOD SUGAR DIAGNOSTIC
STRIP MISCELLANEOUS
Qty: 500 EACH | Refills: 5 | Status: SHIPPED | OUTPATIENT
Start: 2020-06-05 | End: 2021-06-10

## 2020-06-15 LAB — SEVERITY (EYE EXAM): NORMAL

## 2020-06-25 LAB
ALBUMIN SERPL-MCNC: 4.4 G/DL (ref 4–5)
ALBUMIN/GLOB SERPL: 1.6 {RATIO} (ref 1.2–2.2)
ALP SERPL-CCNC: 80 IU/L (ref 39–117)
ALT SERPL-CCNC: 40 IU/L (ref 0–44)
AST SERPL-CCNC: 44 IU/L (ref 0–40)
BASOPHILS # BLD AUTO: 0.1 X10E3/UL (ref 0–0.2)
BASOPHILS NFR BLD AUTO: 1 %
BILIRUB SERPL-MCNC: 0.4 MG/DL (ref 0–1.2)
BUN SERPL-MCNC: 11 MG/DL (ref 6–24)
BUN/CREAT SERPL: 12 (ref 9–20)
CALCIUM SERPL-MCNC: 9.6 MG/DL (ref 8.7–10.2)
CHLORIDE SERPL-SCNC: 97 MMOL/L (ref 96–106)
CO2 SERPL-SCNC: 25 MMOL/L (ref 20–29)
CREAT SERPL-MCNC: 0.94 MG/DL (ref 0.76–1.27)
EOSINOPHIL # BLD AUTO: 0.3 X10E3/UL (ref 0–0.4)
EOSINOPHIL NFR BLD AUTO: 3 %
ERYTHROCYTE [DISTWIDTH] IN BLOOD BY AUTOMATED COUNT: 12.5 % (ref 11.6–15.4)
GLOBULIN SER-MCNC: 2.8 G/DL (ref 1.5–4.5)
GLUCOSE SERPL-MCNC: 178 MG/DL (ref 65–99)
HBA1C MFR BLD: 8 % (ref 4.8–5.6)
HCT VFR BLD AUTO: 43.5 % (ref 37.5–51)
HGB BLD-MCNC: 15.1 G/DL (ref 13–17.7)
IMM GRANULOCYTES # BLD: 0 X10E3/UL (ref 0–0.1)
IMM GRANULOCYTES NFR BLD: 0 %
LYMPHOCYTES # BLD AUTO: 3.5 X10E3/UL (ref 0.7–3.1)
LYMPHOCYTES NFR BLD AUTO: 36 %
MCH RBC QN AUTO: 32 PG (ref 26.6–33)
MCHC RBC AUTO-ENTMCNC: 34.7 G/DL (ref 31.5–35.7)
MCV RBC AUTO: 92 FL (ref 79–97)
MONOCYTES # BLD AUTO: 0.8 X10E3/UL (ref 0.1–0.9)
MONOCYTES NFR BLD AUTO: 8 %
NEUTROPHILS # BLD AUTO: 4.9 X10E3/UL (ref 1.4–7)
NEUTROPHILS NFR BLD AUTO: 52 %
PLATELET # BLD AUTO: 295 X10E3/UL (ref 150–450)
POTASSIUM SERPL-SCNC: 4.6 MMOL/L (ref 3.5–5.2)
PROT SERPL-MCNC: 7.2 G/DL (ref 6–8.5)
RBC # BLD AUTO: 4.72 X10E6/UL (ref 4.14–5.8)
SL AMB EGFR AFRICAN AMERICAN: 110 ML/MIN/1.73
SL AMB EGFR NON AFRICAN AMERICAN: 95 ML/MIN/1.73
SODIUM SERPL-SCNC: 140 MMOL/L (ref 134–144)
WBC # BLD AUTO: 9.6 X10E3/UL (ref 3.4–10.8)

## 2020-06-30 ENCOUNTER — OFFICE VISIT (OUTPATIENT)
Dept: ENDOCRINOLOGY | Facility: HOSPITAL | Age: 50
End: 2020-06-30
Payer: COMMERCIAL

## 2020-06-30 VITALS
TEMPERATURE: 98 F | BODY MASS INDEX: 31.58 KG/M2 | SYSTOLIC BLOOD PRESSURE: 136 MMHG | DIASTOLIC BLOOD PRESSURE: 84 MMHG | HEIGHT: 71 IN | WEIGHT: 225.6 LBS | HEART RATE: 97 BPM

## 2020-06-30 DIAGNOSIS — E78.49 OTHER HYPERLIPIDEMIA: ICD-10-CM

## 2020-06-30 DIAGNOSIS — I10 ESSENTIAL HYPERTENSION: ICD-10-CM

## 2020-06-30 DIAGNOSIS — E10.319 DIABETIC RETINOPATHY ASSOCIATED WITH TYPE 1 DIABETES MELLITUS, MACULAR EDEMA PRESENCE UNSPECIFIED, UNSPECIFIED LATERALITY, UNSPECIFIED RETINOPATHY SEVERITY (HCC): ICD-10-CM

## 2020-06-30 DIAGNOSIS — G62.9 PERIPHERAL POLYNEUROPATHY: ICD-10-CM

## 2020-06-30 DIAGNOSIS — Z96.41 INSULIN PUMP STATUS: ICD-10-CM

## 2020-06-30 DIAGNOSIS — E10.42 TYPE 1 DIABETES MELLITUS WITH DIABETIC POLYNEUROPATHY (HCC): Primary | ICD-10-CM

## 2020-06-30 PROCEDURE — 95251 CONT GLUC MNTR ANALYSIS I&R: CPT | Performed by: NURSE PRACTITIONER

## 2020-06-30 PROCEDURE — 99214 OFFICE O/P EST MOD 30 MIN: CPT | Performed by: NURSE PRACTITIONER

## 2020-06-30 RX ORDER — GABAPENTIN 300 MG/1
600 CAPSULE ORAL 2 TIMES DAILY
COMMUNITY
End: 2020-07-20 | Stop reason: SDUPTHER

## 2020-06-30 RX ORDER — LISINOPRIL 20 MG/1
20 TABLET ORAL DAILY
COMMUNITY
End: 2020-09-16 | Stop reason: SDUPTHER

## 2020-07-13 ENCOUNTER — TELEPHONE (OUTPATIENT)
Dept: ENDOCRINOLOGY | Facility: HOSPITAL | Age: 50
End: 2020-07-13

## 2020-07-13 DIAGNOSIS — E10.42 TYPE 1 DIABETES MELLITUS WITH DIABETIC POLYNEUROPATHY (HCC): Primary | ICD-10-CM

## 2020-07-13 NOTE — TELEPHONE ENCOUNTER
Pt is out of town and his pump  on him  He does have novolog with him but doesn't know how much to use as a back up till his new pump gets over nighted to him  Also he does not know his pump settings for when he gets the new pump and I don't know how to read those downloads  If you get this after 3:30 please send it to the team bin since pt is without insulin at this time

## 2020-07-13 NOTE — TELEPHONE ENCOUNTER
Patient would like Lantus sent to the [facility] in [facility]  He will need to know how much to take and he is aware the office closes shortly

## 2020-07-14 NOTE — TELEPHONE ENCOUNTER
His settings should be as follows:  Basal rates: 12AM 1 25, 2AM 1 55, 8AM 2 5, 12PM 1 6, 10PM 1 3  Carbohydrate ratios: 12AM 3 5, 6PM 3 0  Sensitivity 30  Target 130  Active insulin time 4 hours    If he needs assistance putting these in, we can help him or is Yas Finder still doing virtual visits?

## 2020-07-14 NOTE — TELEPHONE ENCOUNTER
Patient informed  He states he will be receiving his new insulin pump today  What is the best way for him to get his settings?

## 2020-07-20 DIAGNOSIS — G62.9 PERIPHERAL POLYNEUROPATHY: Primary | ICD-10-CM

## 2020-07-20 RX ORDER — GABAPENTIN 300 MG/1
600 CAPSULE ORAL 2 TIMES DAILY
Qty: 360 CAPSULE | Refills: 1 | Status: SHIPPED | OUTPATIENT
Start: 2020-07-20 | End: 2021-01-13

## 2020-07-20 NOTE — TELEPHONE ENCOUNTER
Patient requests refill of Gabapentin  He states he discontinued duloxetine due to inability to ejaculate, which he discussed with Tommy Clement at his last vist- Per patient

## 2020-07-24 DIAGNOSIS — G62.9 PERIPHERAL POLYNEUROPATHY: ICD-10-CM

## 2020-07-24 RX ORDER — DULOXETIN HYDROCHLORIDE 60 MG/1
CAPSULE, DELAYED RELEASE ORAL
Qty: 90 CAPSULE | Refills: 1 | Status: SHIPPED | OUTPATIENT
Start: 2020-07-24 | End: 2020-10-02 | Stop reason: ALTCHOICE

## 2020-08-08 DIAGNOSIS — E10.42 TYPE 1 DIABETES MELLITUS WITH DIABETIC POLYNEUROPATHY (HCC): ICD-10-CM

## 2020-09-16 ENCOUNTER — PATIENT MESSAGE (OUTPATIENT)
Dept: ENDOCRINOLOGY | Facility: HOSPITAL | Age: 50
End: 2020-09-16

## 2020-09-16 DIAGNOSIS — E10.42 TYPE 1 DIABETES MELLITUS WITH DIABETIC POLYNEUROPATHY (HCC): Primary | ICD-10-CM

## 2020-09-17 RX ORDER — LISINOPRIL 20 MG/1
20 TABLET ORAL DAILY
Qty: 30 TABLET | Refills: 0 | Status: SHIPPED | OUTPATIENT
Start: 2020-09-17 | End: 2020-10-15

## 2020-09-29 LAB
ALBUMIN SERPL-MCNC: 4.1 G/DL (ref 4–5)
ALBUMIN/GLOB SERPL: 1.5 {RATIO} (ref 1.2–2.2)
ALP SERPL-CCNC: 80 IU/L (ref 39–117)
ALT SERPL-CCNC: 28 IU/L (ref 0–44)
AST SERPL-CCNC: 30 IU/L (ref 0–40)
BILIRUB SERPL-MCNC: 0.2 MG/DL (ref 0–1.2)
BUN SERPL-MCNC: 8 MG/DL (ref 6–24)
BUN/CREAT SERPL: 9 (ref 9–20)
CALCIUM SERPL-MCNC: 9.4 MG/DL (ref 8.7–10.2)
CHLORIDE SERPL-SCNC: 101 MMOL/L (ref 96–106)
CHOLEST SERPL-MCNC: 183 MG/DL (ref 100–199)
CO2 SERPL-SCNC: 24 MMOL/L (ref 20–29)
CREAT SERPL-MCNC: 0.9 MG/DL (ref 0.76–1.27)
EST. AVERAGE GLUCOSE BLD GHB EST-MCNC: 174 MG/DL
GLOBULIN SER-MCNC: 2.7 G/DL (ref 1.5–4.5)
GLUCOSE SERPL-MCNC: 155 MG/DL (ref 65–99)
HBA1C MFR BLD: 7.7 % (ref 4.8–5.6)
HDLC SERPL-MCNC: 58 MG/DL
LDLC SERPL CALC-MCNC: 105 MG/DL (ref 0–99)
POTASSIUM SERPL-SCNC: 4.5 MMOL/L (ref 3.5–5.2)
PROT SERPL-MCNC: 6.8 G/DL (ref 6–8.5)
SL AMB EGFR AFRICAN AMERICAN: 116 ML/MIN/1.73
SL AMB EGFR NON AFRICAN AMERICAN: 101 ML/MIN/1.73
SL AMB VLDL CHOLESTEROL CALC: 20 MG/DL (ref 5–40)
SODIUM SERPL-SCNC: 139 MMOL/L (ref 134–144)
TRIGL SERPL-MCNC: 111 MG/DL (ref 0–149)
TSH SERPL DL<=0.005 MIU/L-ACNC: 0.86 UIU/ML (ref 0.45–4.5)

## 2020-10-02 ENCOUNTER — TELEPHONE (OUTPATIENT)
Dept: ADMINISTRATIVE | Facility: OTHER | Age: 50
End: 2020-10-02

## 2020-10-02 ENCOUNTER — OFFICE VISIT (OUTPATIENT)
Dept: ENDOCRINOLOGY | Facility: HOSPITAL | Age: 50
End: 2020-10-02
Payer: COMMERCIAL

## 2020-10-02 VITALS
SYSTOLIC BLOOD PRESSURE: 122 MMHG | DIASTOLIC BLOOD PRESSURE: 80 MMHG | WEIGHT: 228.2 LBS | HEART RATE: 94 BPM | BODY MASS INDEX: 31.95 KG/M2 | HEIGHT: 71 IN | TEMPERATURE: 98.4 F

## 2020-10-02 DIAGNOSIS — I10 ESSENTIAL HYPERTENSION: ICD-10-CM

## 2020-10-02 DIAGNOSIS — E78.49 OTHER HYPERLIPIDEMIA: ICD-10-CM

## 2020-10-02 DIAGNOSIS — E10.42 TYPE 1 DIABETES MELLITUS WITH DIABETIC POLYNEUROPATHY (HCC): Primary | ICD-10-CM

## 2020-10-02 DIAGNOSIS — Z96.41 INSULIN PUMP STATUS: ICD-10-CM

## 2020-10-02 DIAGNOSIS — E10.319 DIABETIC RETINOPATHY ASSOCIATED WITH TYPE 1 DIABETES MELLITUS, MACULAR EDEMA PRESENCE UNSPECIFIED, UNSPECIFIED LATERALITY, UNSPECIFIED RETINOPATHY SEVERITY (HCC): ICD-10-CM

## 2020-10-02 PROCEDURE — 99214 OFFICE O/P EST MOD 30 MIN: CPT | Performed by: NURSE PRACTITIONER

## 2020-10-02 PROCEDURE — 95251 CONT GLUC MNTR ANALYSIS I&R: CPT | Performed by: NURSE PRACTITIONER

## 2020-10-15 DIAGNOSIS — E10.42 TYPE 1 DIABETES MELLITUS WITH DIABETIC POLYNEUROPATHY (HCC): ICD-10-CM

## 2020-10-15 DIAGNOSIS — E78.5 HYPERLIPIDEMIA, UNSPECIFIED HYPERLIPIDEMIA TYPE: ICD-10-CM

## 2020-10-15 RX ORDER — LISINOPRIL 20 MG/1
TABLET ORAL
Qty: 30 TABLET | Refills: 0 | Status: SHIPPED | OUTPATIENT
Start: 2020-10-15 | End: 2020-11-09

## 2020-10-15 RX ORDER — SIMVASTATIN 40 MG
40 TABLET ORAL
Qty: 90 TABLET | Refills: 3 | Status: SHIPPED | OUTPATIENT
Start: 2020-10-15 | End: 2021-10-11

## 2020-11-09 DIAGNOSIS — E10.42 TYPE 1 DIABETES MELLITUS WITH DIABETIC POLYNEUROPATHY (HCC): ICD-10-CM

## 2020-11-09 RX ORDER — LISINOPRIL 20 MG/1
TABLET ORAL
Qty: 90 TABLET | Refills: 1 | Status: SHIPPED | OUTPATIENT
Start: 2020-11-09 | End: 2021-04-15

## 2020-11-17 ENCOUNTER — TELEPHONE (OUTPATIENT)
Dept: ENDOCRINOLOGY | Facility: HOSPITAL | Age: 50
End: 2020-11-17

## 2021-01-13 DIAGNOSIS — G62.9 PERIPHERAL POLYNEUROPATHY: ICD-10-CM

## 2021-01-13 RX ORDER — GABAPENTIN 300 MG/1
600 CAPSULE ORAL 2 TIMES DAILY
Qty: 360 CAPSULE | Refills: 1 | Status: SHIPPED | OUTPATIENT
Start: 2021-01-13 | End: 2021-07-14

## 2021-01-14 LAB
ALBUMIN SERPL-MCNC: 4.1 G/DL (ref 4–5)
ALBUMIN/GLOB SERPL: 1.4 {RATIO} (ref 1.2–2.2)
ALP SERPL-CCNC: 77 IU/L (ref 39–117)
ALT SERPL-CCNC: 37 IU/L (ref 0–44)
AST SERPL-CCNC: 35 IU/L (ref 0–40)
BILIRUB SERPL-MCNC: 0.2 MG/DL (ref 0–1.2)
BUN SERPL-MCNC: 8 MG/DL (ref 6–24)
BUN/CREAT SERPL: 9 (ref 9–20)
CALCIUM SERPL-MCNC: 9.2 MG/DL (ref 8.7–10.2)
CHLORIDE SERPL-SCNC: 98 MMOL/L (ref 96–106)
CO2 SERPL-SCNC: 26 MMOL/L (ref 20–29)
CREAT SERPL-MCNC: 0.9 MG/DL (ref 0.76–1.27)
EST. AVERAGE GLUCOSE BLD GHB EST-MCNC: 180 MG/DL
GLOBULIN SER-MCNC: 2.9 G/DL (ref 1.5–4.5)
GLUCOSE SERPL-MCNC: 234 MG/DL (ref 65–99)
HBA1C MFR BLD: 7.9 % (ref 4.8–5.6)
POTASSIUM SERPL-SCNC: 5.4 MMOL/L (ref 3.5–5.2)
PROT SERPL-MCNC: 7 G/DL (ref 6–8.5)
SL AMB EGFR AFRICAN AMERICAN: 116 ML/MIN/1.73
SL AMB EGFR NON AFRICAN AMERICAN: 101 ML/MIN/1.73
SODIUM SERPL-SCNC: 135 MMOL/L (ref 134–144)

## 2021-01-19 ENCOUNTER — OFFICE VISIT (OUTPATIENT)
Dept: ENDOCRINOLOGY | Facility: HOSPITAL | Age: 51
End: 2021-01-19
Payer: COMMERCIAL

## 2021-01-19 VITALS
WEIGHT: 227.2 LBS | BODY MASS INDEX: 31.81 KG/M2 | DIASTOLIC BLOOD PRESSURE: 84 MMHG | HEIGHT: 71 IN | SYSTOLIC BLOOD PRESSURE: 122 MMHG | HEART RATE: 96 BPM

## 2021-01-19 DIAGNOSIS — E78.5 HYPERLIPIDEMIA, UNSPECIFIED HYPERLIPIDEMIA TYPE: ICD-10-CM

## 2021-01-19 DIAGNOSIS — E10.319 DIABETIC RETINOPATHY ASSOCIATED WITH TYPE 1 DIABETES MELLITUS, MACULAR EDEMA PRESENCE UNSPECIFIED, UNSPECIFIED LATERALITY, UNSPECIFIED RETINOPATHY SEVERITY (HCC): ICD-10-CM

## 2021-01-19 DIAGNOSIS — E03.8 CENTRAL HYPOTHYROIDISM: ICD-10-CM

## 2021-01-19 DIAGNOSIS — Z96.41 INSULIN PUMP STATUS: ICD-10-CM

## 2021-01-19 DIAGNOSIS — I10 ESSENTIAL HYPERTENSION: ICD-10-CM

## 2021-01-19 DIAGNOSIS — G62.9 PERIPHERAL POLYNEUROPATHY: ICD-10-CM

## 2021-01-19 DIAGNOSIS — E10.42 TYPE 1 DIABETES MELLITUS WITH DIABETIC POLYNEUROPATHY (HCC): Primary | ICD-10-CM

## 2021-01-19 PROCEDURE — 99214 OFFICE O/P EST MOD 30 MIN: CPT | Performed by: NURSE PRACTITIONER

## 2021-01-19 PROCEDURE — 95251 CONT GLUC MNTR ANALYSIS I&R: CPT | Performed by: NURSE PRACTITIONER

## 2021-01-19 RX ORDER — GUAIFENESIN 600 MG
TABLET, EXTENDED RELEASE 12 HR ORAL EVERY 12 HOURS
COMMUNITY
End: 2022-03-15

## 2021-01-19 RX ORDER — FEXOFENADINE HCL 180 MG/1
TABLET ORAL EVERY 24 HOURS
COMMUNITY
End: 2022-03-15

## 2021-01-19 NOTE — PATIENT INSTRUCTIONS
Be mindful of diet      Stay active and stay hydrated      We made adjustments to her pump today to help her glycemic control throughout the day      Please perform a download of her pump/sensor via [employer] in the next few weeks for review      Continue lisinopril and simvastatin      Obtain lab work as prescribed, including in 1 week to recheck potassium  Follow up with podiatry

## 2021-01-19 NOTE — PROGRESS NOTES
Skye Brown 52 y o  male MRN: [id number]    Encounter: [id number]      Assessment/Plan     Assessment: This is a 52y o -year-old male with type 1 diabetes with retinopathy on insulin pump therapy and hyperlipidemia  Plan:  1  Type 1 diabetes with insulin pump status:  His hemoglobin A1c is improved but remains above goal at 7 9   Review of his Medtronic pump and sensor download reveals he is having a consistent pattern of hyperglycemia after lunch and overnight   Adjusted his insulin to carbohydrate ratio at 6:00 p m  to start at noon at 2 5 also adjusted his basal rate at midnight to 1 40 and at 10:00 p m  to 1 40  and at 6:00 p m  From 2 5 to 3 0   I have asked him to continue a proper diabetic diet and perform a download through [address] [place] [address] in 2 weeks for review  Check hemoglobin A1c prior to next visit      2  Peripheral neuropathy:  Continue gabapentin    Follow up with Podiatry for regular diabetic foot care       3  Hyperlipidemia:  Continue current regimen   Check fasting lipid panel prior to next visit      CC:   Type 1 Diabetes follow-up    History of Present Illness     HPI:  52 y  o  year old male with type 1 diabetes for over 20 years   He is on insulin at home and takes Novolog via Medtronic 670 g insulin pump   Most recent hemoglobin A1c from  is 7 9 January 13, 2020    He denies any polyuria, polydipsia, nocturia and blurry vision  He denies nephropathy but does admit to neuropathy and retinopathy   His Medtronic 670 G insulin pump settings are as follows:          Current settings:  Basal rate  ? 0000 - 1 25 units/hr  ? 0200 - 1 55 units/hr  ? 0800 - 2 50 units/hr  ? 1200 - 1 60 units/hr  ? 2200 - 1 30 units/hr  Carb ratios  ? 0:00 - 3 5  ? 18:00 -3 0  Insulin sensitivity 30  BG target 130         Hypoglycemic episodes: Rare, utilizes CGM        Follows with Dr Quigley for eye care regularly    Last visit was Amberly 15, 2020  The patient's last foot exam was performed at last office visit on June 30, 2020     Blood Sugar/Glucometer/Pump/CGM review:  Insulin pump download from January 5 through January 19, 2021  reveals an average sensor glucose of 153 to with a standard deviation of 41  Auto mode 85% of the time   Total daily dose is  75 units  55 % is basal   There is hyperglycemia occurring  after lunch around 2:00 p m  to approximately 6:00 p m  and then again overnight from 11:00 p m  to approximately 3:00 a m        For hypertension continues on lisinopril 20 mg daily      For hyperlipidemia continues on simvastatin 40 mg daily       Review of Systems   Constitutional: Negative  Negative for chills, fatigue and fever  HENT: Positive for rhinorrhea (post nasal drip- taking OTC)  Negative for trouble swallowing and voice change  Eyes: Negative for visual disturbance  Respiratory: Negative for cough and shortness of breath  Cardiovascular: Negative for chest pain and palpitations  Gastrointestinal: Negative for abdominal pain, constipation, diarrhea, nausea and vomiting  Endocrine: Positive for polyuria ( once per night  nocturia)  Negative for cold intolerance, heat intolerance, polydipsia and polyphagia  Genitourinary: Negative  Musculoskeletal: Negative  Skin: Negative  Allergic/Immunologic: Negative  Neurological: Positive for numbness ( in feet at times)  Negative for dizziness, syncope, light-headedness and headaches  Hematological: Negative  Psychiatric/Behavioral: Negative  All other systems reviewed and are negative  Historical Information   No past medical history on file  No past surgical history on file    Social History   Social History     Substance and Sexual Activity   Alcohol Use Yes    Comment: 1 drink daily     Social History     Substance and Sexual Activity   Drug Use No     Social History     Tobacco Use   Smoking Status Former Smoker    Packs/day: 0 50    Years: 30 00    Pack years: 15 00    Types: Cigarettes    Quit date: 12/14/2012  Years since quittin 1   Smokeless Tobacco Former User     Family History:   Family History   Problem Relation Age of Onset    No Known Problems Mother     Hyperlipidemia Father     Hypertension Father     Irritable bowel syndrome Father     No Known Problems Brother        Meds/Allergies   Current Outpatient Medications   Medication Sig Dispense Refill    ACCU-CHEK GUIDE test strip TEST 5 TIMES DAILY 500 each 5    DYMISTA 137-50 MCG/ACT SUSP SPRAY 1 SPRAY IN EACH NOSTRIL BY INTRANASAL ROUTE 2 TIMES PER DAY  5    fexofenadine (Allegra Allergy) 180 MG tablet every 24 hours      gabapentin (NEURONTIN) 300 mg capsule TAKE 2 CAPSULES (600 MG TOTAL) BY MOUTH 2 (TWO) TIMES A  capsule 1    guaiFENesin (Mucinex) 600 mg 12 hr tablet Every 12 hours      lisinopril (ZESTRIL) 20 mg tablet TAKE 1 TABLET BY MOUTH EVERY DAY 90 tablet 1    NovoLOG 100 UNIT/ML injection USE UP  UNITS DAILY VIA INSULIN PUMP 90 mL 0    simvastatin (ZOCOR) 40 mg tablet TAKE 1 TABLET (40 MG TOTAL) BY MOUTH DAILY AT BEDTIME 90 tablet 3    buPROPion (WELLBUTRIN XL) 300 mg 24 hr tablet Take 300 mg by mouth daily  3     No current facility-administered medications for this visit  Allergies   Allergen Reactions    Grass Extracts [Gramineae Pollens]     Tree Extract        Objective   Vitals: Blood pressure 122/84, pulse 96, height 5' 11" (1 803 m), weight 103 kg (227 lb 3 2 oz)  Physical Exam  Vitals signs reviewed  Constitutional:       Appearance: He is well-developed  Comments:  overweight   HENT:      Head: Normocephalic and atraumatic  Nose: Nose normal    Eyes:      Conjunctiva/sclera: Conjunctivae normal       Pupils: Pupils are equal, round, and reactive to light  Neck:      Musculoskeletal: Normal range of motion and neck supple  Cardiovascular:      Rate and Rhythm: Normal rate and regular rhythm  Heart sounds: Normal heart sounds     Pulmonary:      Effort: Pulmonary effort is normal  Breath sounds: Normal breath sounds  Abdominal:      General: Bowel sounds are normal       Palpations: Abdomen is soft  Musculoskeletal: Normal range of motion  Skin:     General: Skin is warm and dry  Neurological:      Mental Status: He is alert and oriented to person, place, and time  Psychiatric:         Behavior: Behavior normal          Thought Content: Thought content normal          Judgment: Judgment normal        Lab Results:   Lab Results   Component Value Date/Time    Hemoglobin A1C 7 9 (H) 01/13/2021 09:12 AM    Hemoglobin A1C 7 7 (H) 09/28/2020 09:36 AM    Hemoglobin A1C 8 0 (H) 06/24/2020 09:27 AM    White Blood Cell Count 9 6 06/24/2020 09:27 AM    Hemoglobin 15 1 06/24/2020 09:27 AM    HCT 43 5 06/24/2020 09:27 AM    MCV 92 06/24/2020 09:27 AM    Platelet Count 446 06/62/4825 09:27 AM    BUN 8 01/13/2021 09:12 AM    BUN 8 09/28/2020 09:36 AM    BUN 11 06/24/2020 09:27 AM    Potassium 5 4 (H) 01/13/2021 09:12 AM    Potassium 4 5 09/28/2020 09:36 AM    Potassium 4 6 06/24/2020 09:27 AM    Chloride 98 01/13/2021 09:12 AM    Chloride 101 09/28/2020 09:36 AM    Chloride 97 06/24/2020 09:27 AM    CO2 26 01/13/2021 09:12 AM    CO2 24 09/28/2020 09:36 AM    CO2 25 06/24/2020 09:27 AM    Creatinine 0 90 01/13/2021 09:12 AM    Creatinine 0 90 09/28/2020 09:36 AM    Creatinine 0 94 06/24/2020 09:27 AM    AST 35 01/13/2021 09:12 AM    AST 30 09/28/2020 09:36 AM    AST 44 (H) 06/24/2020 09:27 AM    ALT 37 01/13/2021 09:12 AM    ALT 28 09/28/2020 09:36 AM    ALT 40 06/24/2020 09:27 AM    Albumin 4 1 01/13/2021 09:12 AM    Albumin 4 1 09/28/2020 09:36 AM    Albumin 4 4 06/24/2020 09:27 AM    Globulin, Total 2 9 01/13/2021 09:12 AM    Globulin, Total 2 7 09/28/2020 09:36 AM    Globulin, Total 2 8 06/24/2020 09:27 AM    HDL 58 09/28/2020 09:36 AM    Triglycerides 111 09/28/2020 09:36 AM     Portions of the record may have been created with voice recognition software   Occasional wrong word or "sound a like" substitutions may have occurred due to the inherent limitations of voice recognition software  Read the chart carefully and recognize, using context, where substitutions have occurred

## 2021-02-01 DIAGNOSIS — E10.42 TYPE 1 DIABETES MELLITUS WITH DIABETIC POLYNEUROPATHY (HCC): ICD-10-CM

## 2021-02-07 LAB
ALBUMIN SERPL-MCNC: 4.1 G/DL (ref 4–5)
ALBUMIN/GLOB SERPL: 1.5 {RATIO} (ref 1.2–2.2)
ALP SERPL-CCNC: 67 IU/L (ref 39–117)
ALT SERPL-CCNC: 51 IU/L (ref 0–44)
AST SERPL-CCNC: 57 IU/L (ref 0–40)
BILIRUB SERPL-MCNC: 0.2 MG/DL (ref 0–1.2)
BUN SERPL-MCNC: 8 MG/DL (ref 6–24)
BUN/CREAT SERPL: 11 (ref 9–20)
CALCIUM SERPL-MCNC: 9.5 MG/DL (ref 8.7–10.2)
CHLORIDE SERPL-SCNC: 99 MMOL/L (ref 96–106)
CO2 SERPL-SCNC: 25 MMOL/L (ref 20–29)
CREAT SERPL-MCNC: 0.75 MG/DL (ref 0.76–1.27)
GLOBULIN SER-MCNC: 2.8 G/DL (ref 1.5–4.5)
GLUCOSE SERPL-MCNC: 172 MG/DL (ref 65–99)
POTASSIUM SERPL-SCNC: 5.3 MMOL/L (ref 3.5–5.2)
PROT SERPL-MCNC: 6.9 G/DL (ref 6–8.5)
SL AMB EGFR AFRICAN AMERICAN: 125 ML/MIN/1.73
SL AMB EGFR NON AFRICAN AMERICAN: 108 ML/MIN/1.73
SODIUM SERPL-SCNC: 137 MMOL/L (ref 134–144)

## 2021-03-08 ENCOUNTER — DOCUMENTATION (OUTPATIENT)
Dept: ENDOCRINOLOGY | Facility: HOSPITAL | Age: 51
End: 2021-03-08

## 2021-03-22 ENCOUNTER — DOCUMENTATION (OUTPATIENT)
Dept: ENDOCRINOLOGY | Facility: HOSPITAL | Age: 51
End: 2021-03-22

## 2021-03-30 DIAGNOSIS — Z23 ENCOUNTER FOR IMMUNIZATION: ICD-10-CM

## 2021-04-15 DIAGNOSIS — E10.42 TYPE 1 DIABETES MELLITUS WITH DIABETIC POLYNEUROPATHY (HCC): ICD-10-CM

## 2021-04-15 RX ORDER — LISINOPRIL 20 MG/1
TABLET ORAL
Qty: 90 TABLET | Refills: 1 | Status: SHIPPED | OUTPATIENT
Start: 2021-04-15 | End: 2021-04-23 | Stop reason: SINTOL

## 2021-04-18 LAB
ALBUMIN SERPL-MCNC: 4.4 G/DL (ref 4–5)
ALBUMIN/CREAT UR: 22 MG/G CREAT (ref 0–29)
ALBUMIN/GLOB SERPL: 1.6 {RATIO} (ref 1.2–2.2)
ALP SERPL-CCNC: 86 IU/L (ref 39–117)
ALT SERPL-CCNC: 39 IU/L (ref 0–44)
AST SERPL-CCNC: 36 IU/L (ref 0–40)
BILIRUB SERPL-MCNC: <0.2 MG/DL (ref 0–1.2)
BUN SERPL-MCNC: 8 MG/DL (ref 6–24)
BUN/CREAT SERPL: 9 (ref 9–20)
CALCIUM SERPL-MCNC: 10 MG/DL (ref 8.7–10.2)
CHLORIDE SERPL-SCNC: 97 MMOL/L (ref 96–106)
CHOLEST SERPL-MCNC: 186 MG/DL (ref 100–199)
CO2 SERPL-SCNC: 29 MMOL/L (ref 20–29)
CREAT SERPL-MCNC: 0.9 MG/DL (ref 0.76–1.27)
CREAT UR-MCNC: 103.1 MG/DL
EST. AVERAGE GLUCOSE BLD GHB EST-MCNC: 180 MG/DL
GLOBULIN SER-MCNC: 2.8 G/DL (ref 1.5–4.5)
GLUCOSE SERPL-MCNC: 93 MG/DL (ref 65–99)
HBA1C MFR BLD: 7.9 % (ref 4.8–5.6)
HDLC SERPL-MCNC: 68 MG/DL
LDLC SERPL CALC-MCNC: 98 MG/DL (ref 0–99)
MICROALBUMIN UR-MCNC: 22.4 UG/ML
POTASSIUM SERPL-SCNC: 4.4 MMOL/L (ref 3.5–5.2)
PROT SERPL-MCNC: 7.2 G/DL (ref 6–8.5)
SL AMB EGFR AFRICAN AMERICAN: 116 ML/MIN/1.73
SL AMB EGFR NON AFRICAN AMERICAN: 101 ML/MIN/1.73
SL AMB VLDL CHOLESTEROL CALC: 20 MG/DL (ref 5–40)
SODIUM SERPL-SCNC: 141 MMOL/L (ref 134–144)
T4 FREE SERPL-MCNC: 0.69 NG/DL (ref 0.82–1.77)
TRIGL SERPL-MCNC: 114 MG/DL (ref 0–149)
TSH SERPL DL<=0.005 MIU/L-ACNC: 2.02 UIU/ML (ref 0.45–4.5)

## 2021-04-23 ENCOUNTER — OFFICE VISIT (OUTPATIENT)
Dept: ENDOCRINOLOGY | Facility: HOSPITAL | Age: 51
End: 2021-04-23
Payer: COMMERCIAL

## 2021-04-23 VITALS
HEIGHT: 71 IN | BODY MASS INDEX: 31.98 KG/M2 | HEART RATE: 82 BPM | WEIGHT: 228.4 LBS | DIASTOLIC BLOOD PRESSURE: 90 MMHG | SYSTOLIC BLOOD PRESSURE: 130 MMHG

## 2021-04-23 DIAGNOSIS — I10 ESSENTIAL HYPERTENSION: ICD-10-CM

## 2021-04-23 DIAGNOSIS — Z96.41 INSULIN PUMP STATUS: ICD-10-CM

## 2021-04-23 DIAGNOSIS — G62.9 PERIPHERAL POLYNEUROPATHY: ICD-10-CM

## 2021-04-23 DIAGNOSIS — E10.319 DIABETIC RETINOPATHY ASSOCIATED WITH TYPE 1 DIABETES MELLITUS, MACULAR EDEMA PRESENCE UNSPECIFIED, UNSPECIFIED LATERALITY, UNSPECIFIED RETINOPATHY SEVERITY (HCC): ICD-10-CM

## 2021-04-23 DIAGNOSIS — E78.49 OTHER HYPERLIPIDEMIA: ICD-10-CM

## 2021-04-23 DIAGNOSIS — E10.42 TYPE 1 DIABETES MELLITUS WITH DIABETIC POLYNEUROPATHY (HCC): Primary | ICD-10-CM

## 2021-04-23 PROCEDURE — 95251 CONT GLUC MNTR ANALYSIS I&R: CPT | Performed by: NURSE PRACTITIONER

## 2021-04-23 PROCEDURE — 99214 OFFICE O/P EST MOD 30 MIN: CPT | Performed by: NURSE PRACTITIONER

## 2021-04-23 RX ORDER — LOSARTAN POTASSIUM 50 MG/1
50 TABLET ORAL DAILY
Qty: 90 TABLET | Refills: 3 | Status: SHIPPED | OUTPATIENT
Start: 2021-04-23 | End: 2022-04-22

## 2021-04-23 NOTE — PATIENT INSTRUCTIONS
Be mindful of diet      Stay active and stay hydrated      We made adjustments to your pump today to help your glycemic control throughout the day      Please perform a download of her pump/sensor via [employer] in the next few weeks for review  In lieu of Lisinopril, please start losartan, as discussed  Contact the office with any issues      Continue  simvastatin      Obtain lab work as prescribed      Follow up with podiatry

## 2021-04-23 NOTE — PROGRESS NOTES
Kaz Bashir 52 y o  male MRN: [id number]    Encounter: [id number]      Assessment/Plan     Assessment: This is a 52y o -year-old male with  type 1 diabetes with retinopathy on insulin pump therapy and hyperlipidemia  Plan:  1  Type 1 diabetes with insulin pump status:  His hemoglobin A1c is improved but remains above goal at 7 9   Review of his Medtronic pump and sensor download reveals there is hyperglycemia occurring after dinner around 7:00 p m  to approximately 6:00 p m  and overnight from 11:00 p m  to approximately 3:00 a m  For this I have adjusted his insulin to carbohydrate ratio at 6:00 p m  to 2 0 and his basal rate at 10:00 p m  to 1 5  I have asked him to continue a proper diabetic diet and perform a download through [address] [place] [address] in 2 weeks for review  Check hemoglobin A1c prior to next visit      2  Peripheral neuropathy:  Continue gabapentin    Follow up with Podiatry for regular diabetic foot care       3  Hyperlipidemia:  Continue current regimen   Check fasting lipid panel prior to next visit  4     Hypertension:   His blood pressure is 130/90 in the office today  I have asked him to start losartan 50 mg daily in place of his lisinopril  He will contact the office with any side effects or concerns  Check comprehensive metabolic panel prior to next visit  CC:   Type 1 Diabetes follow-up    History of Present Illness     HPI:  52 y  o  year old male with type 1 diabetes for over 20 years   He is on insulin at home and takes Novolog via Medtronic 670 g insulin pump   Most recent hemoglobin A1c from  is 7 9 from April 17, 2021    He denies any polyuria, polydipsia, nocturia and blurry vision   He denies nephropathy but does admit to neuropathy and retinopathy   His Medtronic 670 G insulin pump settings are as follows:          Current settings:  Basal rate  ? 0000 - 1 25 units/hr  ? 0200 - 1 55 units/hr  ? 0800 - 2 50 units/hr  ? 1200 - 1 60 units/hr  ? 2200 - 1 30 units/hr  Carb ratios  ? 0:00 - 3 5  ? 18:00 -3 0  Insulin sensitivity 30  BG target 130         Hypoglycemic episodes: Rare, utilizes CGM        Follows with Dr Dean for eye care regularly  Last visit was Amberly 15, 2020  The patient's last foot exam was performed at last office visit on June 30, 2020     Blood Sugar/Glucometer/Pump/CGM review:  Insulin pump download from April 9 through April 23, 2021  reveals an average sensor glucose of 153 to with a standard deviation of 42  Auto mode 85% of the time   Total daily dose is 104 1 units  59 % is basal   There is hyperglycemia occurring after dinner around 7:00 p m  to approximately 6:00 p m  and overnight from 11:00 p m  to approximately 3:00 a m        For hypertension, he states he discontinued his lisinopril due to a dry cough which has improved since he discontinued the medication  For hyperlipidemia continues on simvastatin 40 mg daily       Review of Systems   Constitutional: Negative  Negative for chills, fatigue and fever  HENT: Negative  Negative for trouble swallowing and voice change  Eyes: Negative for photophobia, pain, discharge, redness, itching and visual disturbance  Respiratory: Negative for cough and shortness of breath  Cardiovascular: Negative for chest pain and palpitations  Gastrointestinal: Negative for abdominal pain, constipation, diarrhea, nausea and vomiting  Endocrine: Positive for polyuria ( once per night nocturia)  Negative for cold intolerance, heat intolerance, polydipsia and polyphagia  Genitourinary: Negative  Musculoskeletal: Negative  Skin: Negative  Allergic/Immunologic: Negative  Neurological: Positive for numbness ( in feet at times)  Negative for dizziness, syncope, light-headedness and headaches  Hematological: Negative  Psychiatric/Behavioral: Negative  All other systems reviewed and are negative  Historical Information   No past medical history on file    No past surgical history on file   Social History   Social History     Substance and Sexual Activity   Alcohol Use Yes    Comment: 1 drink daily     Social History     Substance and Sexual Activity   Drug Use No     Social History     Tobacco Use   Smoking Status Former Smoker    Packs/day: 0 50    Years: 30 00    Pack years: 15 00    Types: Cigarettes    Quit date: 2012    Years since quittin 3   Smokeless Tobacco Former User     Family History:   Family History   Problem Relation Age of Onset    No Known Problems Mother     Hyperlipidemia Father     Hypertension Father     Irritable bowel syndrome Father     No Known Problems Brother        Meds/Allergies   Current Outpatient Medications   Medication Sig Dispense Refill    ACCU-CHEK GUIDE test strip TEST 5 TIMES DAILY 500 each 5    buPROPion (WELLBUTRIN XL) 300 mg 24 hr tablet Take 300 mg by mouth daily  3    DYMISTA 137-50 MCG/ACT SUSP SPRAY 1 SPRAY IN EACH NOSTRIL BY INTRANASAL ROUTE 2 TIMES PER DAY  5    fexofenadine (Allegra Allergy) 180 MG tablet every 24 hours      gabapentin (NEURONTIN) 300 mg capsule TAKE 2 CAPSULES (600 MG TOTAL) BY MOUTH 2 (TWO) TIMES A  capsule 1    guaiFENesin (Mucinex) 600 mg 12 hr tablet Every 12 hours      lisinopril (ZESTRIL) 20 mg tablet TAKE 1 TABLET BY MOUTH EVERY DAY 90 tablet 1    NovoLOG 100 UNIT/ML injection USE UP  UNITS DAILY VIA INSULIN PUMP 90 mL 0    simvastatin (ZOCOR) 40 mg tablet TAKE 1 TABLET (40 MG TOTAL) BY MOUTH DAILY AT BEDTIME 90 tablet 3     No current facility-administered medications for this visit  Allergies   Allergen Reactions    Grass Extracts [Gramineae Pollens]     Tree Extract        Objective   Vitals: There were no vitals taken for this visit  Physical Exam  Vitals signs reviewed  Constitutional:       Appearance: He is well-developed  Comments:  overweight   HENT:      Head: Normocephalic and atraumatic        Nose: Nose normal    Eyes:      Conjunctiva/sclera: Conjunctivae normal       Pupils: Pupils are equal, round, and reactive to light  Neck:      Musculoskeletal: Normal range of motion and neck supple  Cardiovascular:      Rate and Rhythm: Normal rate and regular rhythm  Heart sounds: Normal heart sounds  Pulmonary:      Effort: Pulmonary effort is normal       Breath sounds: Normal breath sounds  Abdominal:      General: Bowel sounds are normal       Palpations: Abdomen is soft  Musculoskeletal: Normal range of motion  Skin:     General: Skin is warm and dry  Neurological:      Mental Status: He is alert and oriented to person, place, and time  Psychiatric:         Behavior: Behavior normal          Thought Content:  Thought content normal          Judgment: Judgment normal          Lab Results:   Lab Results   Component Value Date/Time    Hemoglobin A1C 7 9 (H) 04/17/2021 09:18 AM    Hemoglobin A1C 7 9 (H) 01/13/2021 09:12 AM    Hemoglobin A1C 7 7 (H) 09/28/2020 09:36 AM    White Blood Cell Count 9 6 06/24/2020 09:27 AM    Hemoglobin 15 1 06/24/2020 09:27 AM    HCT 43 5 06/24/2020 09:27 AM    MCV 92 06/24/2020 09:27 AM    Platelet Count 465 32/01/6371 09:27 AM    BUN 8 04/17/2021 09:18 AM    BUN 8 02/06/2021 09:27 AM    BUN 8 01/13/2021 09:12 AM    Potassium 4 4 04/17/2021 09:18 AM    Potassium 5 3 (H) 02/06/2021 09:27 AM    Potassium 5 4 (H) 01/13/2021 09:12 AM    Chloride 97 04/17/2021 09:18 AM    Chloride 99 02/06/2021 09:27 AM    Chloride 98 01/13/2021 09:12 AM    CO2 29 04/17/2021 09:18 AM    CO2 25 02/06/2021 09:27 AM    CO2 26 01/13/2021 09:12 AM    Creatinine 0 90 04/17/2021 09:18 AM    Creatinine 0 75 (L) 02/06/2021 09:27 AM    Creatinine 0 90 01/13/2021 09:12 AM    AST 36 04/17/2021 09:18 AM    AST 57 (H) 02/06/2021 09:27 AM    AST 35 01/13/2021 09:12 AM    ALT 39 04/17/2021 09:18 AM    ALT 51 (H) 02/06/2021 09:27 AM    ALT 37 01/13/2021 09:12 AM    Albumin 4 4 04/17/2021 09:18 AM    Albumin 4 1 02/06/2021 09:27 AM    Albumin 4 1 01/13/2021 09:12 AM    Globulin, Total 2 8 04/17/2021 09:18 AM    Globulin, Total 2 8 02/06/2021 09:27 AM    Globulin, Total 2 9 01/13/2021 09:12 AM    HDL 68 04/17/2021 09:18 AM    HDL 58 09/28/2020 09:36 AM    Triglycerides 114 04/17/2021 09:18 AM    Triglycerides 111 09/28/2020 09:36 AM     Portions of the record may have been created with voice recognition software  Occasional wrong word or "sound a like" substitutions may have occurred due to the inherent limitations of voice recognition software  Read the chart carefully and recognize, using context, where substitutions have occurred

## 2021-05-03 DIAGNOSIS — E10.42 TYPE 1 DIABETES MELLITUS WITH DIABETIC POLYNEUROPATHY (HCC): ICD-10-CM

## 2021-05-18 ENCOUNTER — DOCUMENTATION (OUTPATIENT)
Dept: ENDOCRINOLOGY | Facility: HOSPITAL | Age: 51
End: 2021-05-18

## 2021-06-10 DIAGNOSIS — E10.42 TYPE 1 DIABETES MELLITUS WITH DIABETIC POLYNEUROPATHY (HCC): ICD-10-CM

## 2021-06-10 RX ORDER — BLOOD SUGAR DIAGNOSTIC
STRIP MISCELLANEOUS
Qty: 500 STRIP | Refills: 5 | Status: SHIPPED | OUTPATIENT
Start: 2021-06-10 | End: 2022-07-01

## 2021-07-14 DIAGNOSIS — G62.9 PERIPHERAL POLYNEUROPATHY: ICD-10-CM

## 2021-07-14 RX ORDER — GABAPENTIN 300 MG/1
600 CAPSULE ORAL 2 TIMES DAILY
Qty: 360 CAPSULE | Refills: 1 | Status: SHIPPED | OUTPATIENT
Start: 2021-07-14 | End: 2022-02-15 | Stop reason: SDUPTHER

## 2021-07-26 DIAGNOSIS — E10.42 TYPE 1 DIABETES MELLITUS WITH DIABETIC POLYNEUROPATHY (HCC): ICD-10-CM

## 2021-07-30 LAB
ALBUMIN SERPL-MCNC: 4.3 G/DL (ref 4–5)
ALBUMIN/GLOB SERPL: 1.5 {RATIO} (ref 1.2–2.2)
ALP SERPL-CCNC: 88 IU/L (ref 48–121)
ALT SERPL-CCNC: 46 IU/L (ref 0–44)
AST SERPL-CCNC: 45 IU/L (ref 0–40)
BILIRUB SERPL-MCNC: 0.3 MG/DL (ref 0–1.2)
BUN SERPL-MCNC: 7 MG/DL (ref 6–24)
BUN/CREAT SERPL: 8 (ref 9–20)
CALCIUM SERPL-MCNC: 9.4 MG/DL (ref 8.7–10.2)
CHLORIDE SERPL-SCNC: 99 MMOL/L (ref 96–106)
CO2 SERPL-SCNC: 23 MMOL/L (ref 20–29)
CREAT SERPL-MCNC: 0.88 MG/DL (ref 0.76–1.27)
EST. AVERAGE GLUCOSE BLD GHB EST-MCNC: 166 MG/DL
GLOBULIN SER-MCNC: 2.8 G/DL (ref 1.5–4.5)
GLUCOSE SERPL-MCNC: 143 MG/DL (ref 65–99)
HBA1C MFR BLD: 7.4 % (ref 4.8–5.6)
POTASSIUM SERPL-SCNC: 4.5 MMOL/L (ref 3.5–5.2)
PROT SERPL-MCNC: 7.1 G/DL (ref 6–8.5)
SL AMB EGFR AFRICAN AMERICAN: 117 ML/MIN/1.73
SL AMB EGFR NON AFRICAN AMERICAN: 101 ML/MIN/1.73
SODIUM SERPL-SCNC: 138 MMOL/L (ref 134–144)
T4 FREE SERPL-MCNC: 0.79 NG/DL (ref 0.82–1.77)
TSH SERPL DL<=0.005 MIU/L-ACNC: 1.16 UIU/ML (ref 0.45–4.5)

## 2021-08-04 ENCOUNTER — OFFICE VISIT (OUTPATIENT)
Dept: ENDOCRINOLOGY | Facility: HOSPITAL | Age: 51
End: 2021-08-04
Payer: COMMERCIAL

## 2021-08-04 VITALS
SYSTOLIC BLOOD PRESSURE: 120 MMHG | DIASTOLIC BLOOD PRESSURE: 78 MMHG | BODY MASS INDEX: 31.78 KG/M2 | HEIGHT: 71 IN | HEART RATE: 96 BPM | WEIGHT: 227 LBS

## 2021-08-04 DIAGNOSIS — I10 ESSENTIAL HYPERTENSION: ICD-10-CM

## 2021-08-04 DIAGNOSIS — E78.49 OTHER HYPERLIPIDEMIA: ICD-10-CM

## 2021-08-04 DIAGNOSIS — E10.319 DIABETIC RETINOPATHY ASSOCIATED WITH TYPE 1 DIABETES MELLITUS, MACULAR EDEMA PRESENCE UNSPECIFIED, UNSPECIFIED LATERALITY, UNSPECIFIED RETINOPATHY SEVERITY (HCC): ICD-10-CM

## 2021-08-04 DIAGNOSIS — E10.42 TYPE 1 DIABETES MELLITUS WITH DIABETIC POLYNEUROPATHY (HCC): Primary | ICD-10-CM

## 2021-08-04 DIAGNOSIS — Z96.41 INSULIN PUMP STATUS: ICD-10-CM

## 2021-08-04 DIAGNOSIS — G62.9 PERIPHERAL POLYNEUROPATHY: ICD-10-CM

## 2021-08-04 PROCEDURE — 95251 CONT GLUC MNTR ANALYSIS I&R: CPT | Performed by: NURSE PRACTITIONER

## 2021-08-04 PROCEDURE — 99214 OFFICE O/P EST MOD 30 MIN: CPT | Performed by: NURSE PRACTITIONER

## 2021-08-04 RX ORDER — MONTELUKAST SODIUM 10 MG/1
10 TABLET ORAL
COMMUNITY
End: 2022-03-15

## 2021-08-04 NOTE — PATIENT INSTRUCTIONS
Be mindful of diet      Stay active and stay hydrated      We made adjustments to your pump today to help your glycemic control throughout the day      Please perform a download of her pump/sensor via [employer] in the next few weeks for review      Continue Losartan      Contact the office with any issues      Continue  simvastatin      Obtain lab work as prescribed      Follow up with podiatry      Obtain a diabetic eye exam

## 2021-08-04 NOTE — PROGRESS NOTES
Luzma Wasserman 54 y o  male MRN: [id number]    Encounter: [id number]      Assessment/Plan     Assessment: This is a 54y o -year-old male with type 1 diabetes with retinopathy on insulin pump therapy and hyperlipidemia       Plan:  1  Type 1 diabetes with insulin pump status:  His hemoglobin A1c is improved but remains above goal at 7 4   Review of his Medtronic pump and sensor download reveals there is hyperglycemia occurring after   Lunch and dinner  For this, I have adjusted his insulin to carbohydrate ratio at noon to 2 0 and at 6:00 p m  to 1 8  I have also adjusted his basal rate at 8:00 a m  slightly to 2 1  I have asked him to continue a proper diabetic diet and perform a download through [address] [place] [address] in 2 weeks for review  Check hemoglobin A1c prior to next visit      2  Peripheral neuropathy:  Continue gabapentin    Follow up with Podiatry for regular diabetic foot care       3  Hyperlipidemia:  Continue current regimen   Check fasting lipid panel prior to next visit      4  Hypertension: He is normotensive in the office today in the office today  Continue Losartan  Check comprehensive metabolic panel prior to next visit  CC: Type 1 Diabetes Follow up    History of Present Illness     HPI:  54 y  o  year old male with type 1 diabetes for over 20 years   He is on insulin at home and takes Novolog via Medtronic 670 g insulin pump   Most recent hemoglobin A1c from  is 7 4  from July 29, 2021    He denies any polyuria, polydipsia, nocturia and blurry vision  He denies nephropathy but does admit to neuropathy and retinopathy   His Medtronic 670 G insulin pump settings are as follows:          Current settings:  Basal rate  ? 0000 - 1 25 units/hr  ? 0200 - 1 55 units/hr  ? 0800 - 2 50 units/hr  ? 1200 - 1 60 units/hr  ? 2200 - 1 30 units/hr  Carb ratios  ? 0:00 - 3 5  ? 18:00 -3 0  Insulin sensitivity 30  BG target 130         Hypoglycemic episodes: Rare, utilizes CGM        Follows with Dr Radhames Murrieta for eye care regularly   Last visit was Amberly 15, 2020  The patient's last foot exam was performed at last office visit on July 7, 2021 with podiatry      Blood Sugar/Glucometer/Pump/CGM review:  Insulin pump download from July 22 through August 4, 2021  reveals an average sensor glucose of 148 to with a standard deviation of  34  Auto mode  90% of the time   Total daily dose is 74 1units  65% is basal   There is hyperglycemia occurring after lunch and dinner      For hypertension, he states he discontinued his lisinopril due to a dry cough which has improved since he discontinued the medication  For hyperlipidemia continues on simvastatin 40 mg daily            Review of Systems   Constitutional: Negative  Negative for chills, fatigue and fever  HENT: Negative  Negative for trouble swallowing and voice change  Eyes: Negative for visual disturbance  Respiratory: Positive for cough (at times)  Negative for shortness of breath  Cardiovascular: Negative for chest pain and palpitations  Gastrointestinal: Negative for abdominal pain, constipation, diarrhea, nausea and vomiting  Endocrine: Positive for polyuria (once per night nocturia)  Negative for cold intolerance, heat intolerance, polydipsia and polyphagia  Genitourinary: Negative  Musculoskeletal: Negative  Skin: Negative  Allergic/Immunologic: Negative  Neurological: Positive for numbness (in feet at times)  Negative for dizziness, syncope, light-headedness and headaches  Hematological: Negative  Psychiatric/Behavioral: Negative  All other systems reviewed and are negative  Historical Information   No past medical history on file  No past surgical history on file    Social History   Social History     Substance and Sexual Activity   Alcohol Use Yes    Comment: 1 drink daily     Social History     Substance and Sexual Activity   Drug Use No     Social History     Tobacco Use   Smoking Status Former Smoker    Packs/day: 0 50  Years: 30 00    Pack years: 15 00    Types: Cigarettes    Quit date: 2012    Years since quittin 6   Smokeless Tobacco Former User     Family History:   Family History   Problem Relation Age of Onset    No Known Problems Mother     Hyperlipidemia Father     Hypertension Father     Irritable bowel syndrome Father     No Known Problems Brother        Meds/Allergies   Current Outpatient Medications   Medication Sig Dispense Refill    Accu-Chek Guide test strip USE TO TEST 5 TIMES DAILY 500 strip 5    buPROPion (WELLBUTRIN XL) 300 mg 24 hr tablet Take 300 mg by mouth daily  3    DYMISTA 137-50 MCG/ACT SUSP SPRAY 1 SPRAY IN EACH NOSTRIL BY INTRANASAL ROUTE 2 TIMES PER DAY  5    fexofenadine (Allegra Allergy) 180 MG tablet every 24 hours      gabapentin (NEURONTIN) 300 mg capsule TAKE 2 CAPSULES (600 MG TOTAL) BY MOUTH 2 (TWO) TIMES A  capsule 1    guaiFENesin (Mucinex) 600 mg 12 hr tablet Every 12 hours      losartan (COZAAR) 50 mg tablet Take 1 tablet (50 mg total) by mouth daily 90 tablet 3    montelukast (SINGULAIR) 10 mg tablet Take 10 mg by mouth daily at bedtime      NovoLOG 100 UNIT/ML injection USE UP  UNITS DAILY VIA INSULIN PUMP 90 mL 0    simvastatin (ZOCOR) 40 mg tablet TAKE 1 TABLET (40 MG TOTAL) BY MOUTH DAILY AT BEDTIME 90 tablet 3     No current facility-administered medications for this visit  Allergies   Allergen Reactions    Grass Extracts [Gramineae Pollens]     Tree Extract        Objective   Vitals: Blood pressure 120/78, pulse 96, height 5' 11" (1 803 m), weight 103 kg (227 lb)  Physical Exam  Vitals reviewed  Constitutional:       Appearance: He is well-developed  Comments: overweight   HENT:      Head: Normocephalic and atraumatic  Nose: Nose normal    Eyes:      Conjunctiva/sclera: Conjunctivae normal       Pupils: Pupils are equal, round, and reactive to light     Cardiovascular:      Rate and Rhythm: Normal rate and regular rhythm  Heart sounds: Normal heart sounds  Pulmonary:      Effort: Pulmonary effort is normal       Breath sounds: Normal breath sounds  Abdominal:      General: Bowel sounds are normal       Palpations: Abdomen is soft  Musculoskeletal:         General: Normal range of motion  Cervical back: Normal range of motion and neck supple  Skin:     General: Skin is warm and dry  Neurological:      Mental Status: He is alert and oriented to person, place, and time  Psychiatric:         Behavior: Behavior normal          Thought Content:  Thought content normal          Judgment: Judgment normal        Lab Results:   Lab Results   Component Value Date/Time    Hemoglobin A1C 7 4 (H) 07/29/2021 10:37 AM    Hemoglobin A1C 7 9 (H) 04/17/2021 09:18 AM    Hemoglobin A1C 7 9 (H) 01/13/2021 09:12 AM    BUN 7 07/29/2021 10:37 AM    BUN 8 04/17/2021 09:18 AM    BUN 8 02/06/2021 09:27 AM    Potassium 4 5 07/29/2021 10:37 AM    Potassium 4 4 04/17/2021 09:18 AM    Potassium 5 3 (H) 02/06/2021 09:27 AM    Chloride 99 07/29/2021 10:37 AM    Chloride 97 04/17/2021 09:18 AM    Chloride 99 02/06/2021 09:27 AM    CO2 23 07/29/2021 10:37 AM    CO2 29 04/17/2021 09:18 AM    CO2 25 02/06/2021 09:27 AM    Creatinine 0 88 07/29/2021 10:37 AM    Creatinine 0 90 04/17/2021 09:18 AM    Creatinine 0 75 (L) 02/06/2021 09:27 AM    AST 45 (H) 07/29/2021 10:37 AM    AST 36 04/17/2021 09:18 AM    AST 57 (H) 02/06/2021 09:27 AM    ALT 46 (H) 07/29/2021 10:37 AM    ALT 39 04/17/2021 09:18 AM    ALT 51 (H) 02/06/2021 09:27 AM    Albumin 4 3 07/29/2021 10:37 AM    Albumin 4 4 04/17/2021 09:18 AM    Albumin 4 1 02/06/2021 09:27 AM    Globulin, Total 2 8 07/29/2021 10:37 AM    Globulin, Total 2 8 04/17/2021 09:18 AM    Globulin, Total 2 8 02/06/2021 09:27 AM    HDL 68 04/17/2021 09:18 AM    HDL 58 09/28/2020 09:36 AM    Triglycerides 114 04/17/2021 09:18 AM    Triglycerides 111 09/28/2020 09:36 AM       Portions of the record may have been created with voice recognition software  Occasional wrong word or "sound a like" substitutions may have occurred due to the inherent limitations of voice recognition software  Read the chart carefully and recognize, using context, where substitutions have occurred

## 2021-10-10 DIAGNOSIS — E78.5 HYPERLIPIDEMIA, UNSPECIFIED HYPERLIPIDEMIA TYPE: ICD-10-CM

## 2021-10-11 RX ORDER — SIMVASTATIN 40 MG
40 TABLET ORAL
Qty: 90 TABLET | Refills: 3 | Status: SHIPPED | OUTPATIENT
Start: 2021-10-11

## 2021-10-20 DIAGNOSIS — E10.42 TYPE 1 DIABETES MELLITUS WITH DIABETIC POLYNEUROPATHY (HCC): ICD-10-CM

## 2021-11-02 LAB
ALBUMIN SERPL-MCNC: 4.3 G/DL (ref 4–5)
ALBUMIN/GLOB SERPL: 1.6 {RATIO} (ref 1.2–2.2)
ALP SERPL-CCNC: 71 IU/L (ref 44–121)
ALT SERPL-CCNC: 38 IU/L (ref 0–44)
AST SERPL-CCNC: 48 IU/L (ref 0–40)
BILIRUB SERPL-MCNC: 0.3 MG/DL (ref 0–1.2)
BUN SERPL-MCNC: 11 MG/DL (ref 6–24)
BUN/CREAT SERPL: 12 (ref 9–20)
CALCIUM SERPL-MCNC: 9.8 MG/DL (ref 8.7–10.2)
CHLORIDE SERPL-SCNC: 98 MMOL/L (ref 96–106)
CHOLEST SERPL-MCNC: 177 MG/DL (ref 100–199)
CO2 SERPL-SCNC: 25 MMOL/L (ref 20–29)
CREAT SERPL-MCNC: 0.89 MG/DL (ref 0.76–1.27)
EST. AVERAGE GLUCOSE BLD GHB EST-MCNC: 180 MG/DL
GLOBULIN SER-MCNC: 2.7 G/DL (ref 1.5–4.5)
GLUCOSE SERPL-MCNC: 149 MG/DL (ref 65–99)
HBA1C MFR BLD: 7.9 % (ref 4.8–5.6)
HDLC SERPL-MCNC: 94 MG/DL
LDLC SERPL CALC-MCNC: 72 MG/DL (ref 0–99)
POTASSIUM SERPL-SCNC: 4.8 MMOL/L (ref 3.5–5.2)
PROT SERPL-MCNC: 7 G/DL (ref 6–8.5)
SL AMB EGFR AFRICAN AMERICAN: 116 ML/MIN/1.73
SL AMB EGFR NON AFRICAN AMERICAN: 100 ML/MIN/1.73
SL AMB VLDL CHOLESTEROL CALC: 11 MG/DL (ref 5–40)
SODIUM SERPL-SCNC: 139 MMOL/L (ref 134–144)
TRIGL SERPL-MCNC: 58 MG/DL (ref 0–149)
TSH SERPL DL<=0.005 MIU/L-ACNC: 0.85 UIU/ML (ref 0.45–4.5)

## 2021-11-11 ENCOUNTER — OFFICE VISIT (OUTPATIENT)
Dept: ENDOCRINOLOGY | Facility: HOSPITAL | Age: 51
End: 2021-11-11
Payer: COMMERCIAL

## 2021-11-11 VITALS
HEART RATE: 104 BPM | DIASTOLIC BLOOD PRESSURE: 80 MMHG | HEIGHT: 71 IN | BODY MASS INDEX: 31.67 KG/M2 | SYSTOLIC BLOOD PRESSURE: 122 MMHG | WEIGHT: 226.2 LBS

## 2021-11-11 DIAGNOSIS — I10 ESSENTIAL HYPERTENSION: ICD-10-CM

## 2021-11-11 DIAGNOSIS — Z96.41 INSULIN PUMP STATUS: ICD-10-CM

## 2021-11-11 DIAGNOSIS — E03.8 CENTRAL HYPOTHYROIDISM: ICD-10-CM

## 2021-11-11 DIAGNOSIS — E10.319 DIABETIC RETINOPATHY ASSOCIATED WITH TYPE 1 DIABETES MELLITUS, MACULAR EDEMA PRESENCE UNSPECIFIED, UNSPECIFIED LATERALITY, UNSPECIFIED RETINOPATHY SEVERITY (HCC): ICD-10-CM

## 2021-11-11 DIAGNOSIS — G62.9 PERIPHERAL POLYNEUROPATHY: ICD-10-CM

## 2021-11-11 DIAGNOSIS — E10.42 TYPE 1 DIABETES MELLITUS WITH DIABETIC POLYNEUROPATHY (HCC): Primary | ICD-10-CM

## 2021-11-11 DIAGNOSIS — E78.5 HYPERLIPIDEMIA, UNSPECIFIED HYPERLIPIDEMIA TYPE: ICD-10-CM

## 2021-11-11 PROCEDURE — 99214 OFFICE O/P EST MOD 30 MIN: CPT | Performed by: NURSE PRACTITIONER

## 2021-11-11 RX ORDER — LORATADINE 10 MG/1
10 TABLET ORAL DAILY
COMMUNITY
End: 2022-06-29

## 2021-11-12 ENCOUNTER — TELEPHONE (OUTPATIENT)
Dept: ADMINISTRATIVE | Facility: OTHER | Age: 51
End: 2021-11-12

## 2021-11-12 NOTE — LETTER
Diabetic Eye Exam Form    Date Requested: 21  Patient: Lonell Aschoff  Patient : 1970   Referring Provider: Albert Kwan,     Dilated Retinal Exam, Optomap-Iris Exam, or Fundus Photography Done         Yes ([place] one above)         No     Date of Diabetic Eye Exam ______________________________    Left Eye      Exam did show retinopathy    Exam did not show retinopathy         Mild       Moderate       None       Proliferative       Severe     Right Eye     Exam did show retinopathy    Exam did not show retinopathy         Mild       Moderate       None       Proliferative       Severe     Comments __________________________________________________________    Practice Providing Exam ______________________________________________    Exam Performed By (print name) _______________________________________      Provider Signature ___________________________________________________    These reports are needed for  compliance  Please fax this completed form and a copy of the Diabetic Eye Exam report to our office located at [place] [address] as soon as possible via [phone] attention [place]: Phone [phone]    We thank you for your assistance in treating our mutual patient

## 2021-11-12 NOTE — LETTER
Diabetic Eye Exam Form    Date Requested: 21  Patient: Jason Sanchez  Patient : 1970   Referring Provider: Leonel Avelar,     Dilated Retinal Exam, Optomap-Iris Exam, or Fundus Photography Done         Yes ([place] one above)         No     Date of Diabetic Eye Exam ______________________________    Left Eye      Exam did show retinopathy    Exam did not show retinopathy         Mild       Moderate       None       Proliferative       Severe     Right Eye     Exam did show retinopathy    Exam did not show retinopathy         Mild       Moderate       None       Proliferative       Severe     Comments __________________________________________________________    Practice Providing Exam ______________________________________________    Exam Performed By (print name) _______________________________________      Provider Signature ___________________________________________________    These reports are needed for  compliance  Please fax this completed form and a copy of the Diabetic Eye Exam report to our office located at [place] [address] as soon as possible via [phone] attention [place]lin [place]: Phone [phone]    We thank you for your assistance in treating our mutual patient

## 2021-11-12 NOTE — TELEPHONE ENCOUNTER
----- Message from [address] [facility] sent at 11/11/2021  9:30 AM EST -----  Regarding: DM EYE EXAM  11/11/21 9:30 AM    Hello, our patient Amy Carrera has had a DM Eye Exam performed by Dr Adrián Rubalcava in [facility]  His number is [phone]      Thank you,  [email or web address] [facility] Ilene Reich

## 2021-11-17 NOTE — TELEPHONE ENCOUNTER
Upon review of the In Basket request and the patient's chart, initial outreach has been made via fax, please see Contacts section for details       Thank you  Luann Doing

## 2021-11-24 NOTE — TELEPHONE ENCOUNTER
As a follow-up, a second attempt has been made for outreach via fax, please see Contacts section for details      Thank you  Inell Bosworth

## 2022-01-04 NOTE — TELEPHONE ENCOUNTER
As a final attempt, a third outreach has been made via telephone call  Please see Contacts section for details  This encounter will be closed and completed by end of day  Should we receive the requested information because of previous outreach attempts, the requested patient's chart will be updated appropriately       Thank you  Susannah Moser

## 2022-01-18 DIAGNOSIS — E10.42 TYPE 1 DIABETES MELLITUS WITH DIABETIC POLYNEUROPATHY (HCC): ICD-10-CM

## 2022-02-12 LAB
ALBUMIN SERPL-MCNC: 4.3 G/DL (ref 4–5)
ALBUMIN/GLOB SERPL: 1.5 {RATIO} (ref 1.2–2.2)
ALP SERPL-CCNC: 75 IU/L (ref 44–121)
ALT SERPL-CCNC: 37 IU/L (ref 0–44)
AST SERPL-CCNC: 48 IU/L (ref 0–40)
BASOPHILS # BLD AUTO: 0.1 X10E3/UL (ref 0–0.2)
BASOPHILS NFR BLD AUTO: 1 %
BILIRUB SERPL-MCNC: 0.3 MG/DL (ref 0–1.2)
BUN SERPL-MCNC: 8 MG/DL (ref 6–24)
BUN/CREAT SERPL: 9 (ref 9–20)
CALCIUM SERPL-MCNC: 9.1 MG/DL (ref 8.7–10.2)
CHLORIDE SERPL-SCNC: 95 MMOL/L (ref 96–106)
CO2 SERPL-SCNC: 22 MMOL/L (ref 20–29)
CREAT SERPL-MCNC: 0.92 MG/DL (ref 0.76–1.27)
EOSINOPHIL # BLD AUTO: 0.5 X10E3/UL (ref 0–0.4)
EOSINOPHIL NFR BLD AUTO: 7 %
ERYTHROCYTE [DISTWIDTH] IN BLOOD BY AUTOMATED COUNT: 12.1 % (ref 11.6–15.4)
EST. AVERAGE GLUCOSE BLD GHB EST-MCNC: 171 MG/DL
GLOBULIN SER-MCNC: 2.8 G/DL (ref 1.5–4.5)
GLUCOSE SERPL-MCNC: 188 MG/DL (ref 65–99)
HBA1C MFR BLD: 7.6 % (ref 4.8–5.6)
HCT VFR BLD AUTO: 40.9 % (ref 37.5–51)
HGB BLD-MCNC: 14.4 G/DL (ref 13–17.7)
IMM GRANULOCYTES # BLD: 0 X10E3/UL (ref 0–0.1)
IMM GRANULOCYTES NFR BLD: 0 %
LYMPHOCYTES # BLD AUTO: 3.1 X10E3/UL (ref 0.7–3.1)
LYMPHOCYTES NFR BLD AUTO: 41 %
MCH RBC QN AUTO: 33.7 PG (ref 26.6–33)
MCHC RBC AUTO-ENTMCNC: 35.2 G/DL (ref 31.5–35.7)
MCV RBC AUTO: 96 FL (ref 79–97)
MONOCYTES # BLD AUTO: 0.5 X10E3/UL (ref 0.1–0.9)
MONOCYTES NFR BLD AUTO: 7 %
NEUTROPHILS # BLD AUTO: 3.4 X10E3/UL (ref 1.4–7)
NEUTROPHILS NFR BLD AUTO: 44 %
PLATELET # BLD AUTO: 311 X10E3/UL (ref 150–450)
POTASSIUM SERPL-SCNC: 5.2 MMOL/L (ref 3.5–5.2)
PROT SERPL-MCNC: 7.1 G/DL (ref 6–8.5)
RBC # BLD AUTO: 4.27 X10E6/UL (ref 4.14–5.8)
SL AMB EGFR AFRICAN AMERICAN: 113 ML/MIN/1.73
SL AMB EGFR NON AFRICAN AMERICAN: 97 ML/MIN/1.73
SODIUM SERPL-SCNC: 135 MMOL/L (ref 134–144)
TSH SERPL DL<=0.005 MIU/L-ACNC: 0.98 UIU/ML (ref 0.45–4.5)
WBC # BLD AUTO: 7.6 X10E3/UL (ref 3.4–10.8)

## 2022-02-15 DIAGNOSIS — G62.9 PERIPHERAL POLYNEUROPATHY: Primary | ICD-10-CM

## 2022-02-15 RX ORDER — GABAPENTIN 300 MG/1
600 CAPSULE ORAL 2 TIMES DAILY
Qty: 360 CAPSULE | Refills: 1 | Status: SHIPPED | OUTPATIENT
Start: 2022-02-15

## 2022-03-15 ENCOUNTER — OFFICE VISIT (OUTPATIENT)
Dept: ENDOCRINOLOGY | Facility: HOSPITAL | Age: 52
End: 2022-03-15
Payer: COMMERCIAL

## 2022-03-15 VITALS
WEIGHT: 222 LBS | HEART RATE: 103 BPM | DIASTOLIC BLOOD PRESSURE: 84 MMHG | BODY MASS INDEX: 31.08 KG/M2 | SYSTOLIC BLOOD PRESSURE: 134 MMHG | OXYGEN SATURATION: 97 % | HEIGHT: 71 IN

## 2022-03-15 DIAGNOSIS — R11.0 NAUSEA IN ADULT: ICD-10-CM

## 2022-03-15 DIAGNOSIS — G62.9 PERIPHERAL POLYNEUROPATHY: ICD-10-CM

## 2022-03-15 DIAGNOSIS — E78.5 HYPERLIPIDEMIA, UNSPECIFIED HYPERLIPIDEMIA TYPE: ICD-10-CM

## 2022-03-15 DIAGNOSIS — Z96.41 INSULIN PUMP STATUS: ICD-10-CM

## 2022-03-15 DIAGNOSIS — E10.319 DIABETIC RETINOPATHY ASSOCIATED WITH TYPE 1 DIABETES MELLITUS, MACULAR EDEMA PRESENCE UNSPECIFIED, UNSPECIFIED LATERALITY, UNSPECIFIED RETINOPATHY SEVERITY (HCC): ICD-10-CM

## 2022-03-15 DIAGNOSIS — E10.42 TYPE 1 DIABETES MELLITUS WITH DIABETIC POLYNEUROPATHY (HCC): Primary | ICD-10-CM

## 2022-03-15 PROCEDURE — 95251 CONT GLUC MNTR ANALYSIS I&R: CPT | Performed by: NURSE PRACTITIONER

## 2022-03-15 PROCEDURE — 99214 OFFICE O/P EST MOD 30 MIN: CPT | Performed by: NURSE PRACTITIONER

## 2022-03-15 NOTE — PATIENT INSTRUCTIONS
Be mindful of diet      Stay active and stay hydrated      Please perform a download of her pump/sensor via [employer] in the next few weeks for review      Continue Losartan      Contact the office with any issues      Continue simvastatin      Obtain lab work as prescribed      Follow up with podiatry      Obtain a diabetic eye exam     Consider follow up with GI for nausea and cough  Consider OTC Prilosec for symptoms

## 2022-04-15 ENCOUNTER — DOCUMENTATION (OUTPATIENT)
Dept: ENDOCRINOLOGY | Facility: HOSPITAL | Age: 52
End: 2022-04-15

## 2022-04-22 DIAGNOSIS — I10 ESSENTIAL HYPERTENSION: ICD-10-CM

## 2022-04-22 RX ORDER — LOSARTAN POTASSIUM 50 MG/1
TABLET ORAL
Qty: 90 TABLET | Refills: 3 | Status: SHIPPED | OUTPATIENT
Start: 2022-04-22

## 2022-04-23 DIAGNOSIS — E10.42 TYPE 1 DIABETES MELLITUS WITH DIABETIC POLYNEUROPATHY (HCC): ICD-10-CM

## 2022-05-04 ENCOUNTER — OFFICE VISIT (OUTPATIENT)
Dept: GASTROENTEROLOGY | Facility: CLINIC | Age: 52
End: 2022-05-04
Payer: COMMERCIAL

## 2022-05-04 ENCOUNTER — TELEPHONE (OUTPATIENT)
Dept: GASTROENTEROLOGY | Facility: CLINIC | Age: 52
End: 2022-05-04

## 2022-05-04 VITALS
BODY MASS INDEX: 31.08 KG/M2 | DIASTOLIC BLOOD PRESSURE: 82 MMHG | HEIGHT: 71 IN | HEART RATE: 93 BPM | SYSTOLIC BLOOD PRESSURE: 120 MMHG | WEIGHT: 222 LBS

## 2022-05-04 DIAGNOSIS — R11.0 NAUSEA IN ADULT: ICD-10-CM

## 2022-05-04 DIAGNOSIS — R11.14 BILIOUS VOMITING WITH NAUSEA: Primary | ICD-10-CM

## 2022-05-04 DIAGNOSIS — Z12.11 SCREENING FOR COLON CANCER: ICD-10-CM

## 2022-05-04 DIAGNOSIS — Z12.11 SCREENING FOR COLON CANCER: Primary | ICD-10-CM

## 2022-05-04 PROCEDURE — 99204 OFFICE O/P NEW MOD 45 MIN: CPT | Performed by: NURSE PRACTITIONER

## 2022-05-04 RX ORDER — PANTOPRAZOLE SODIUM 40 MG/1
40 TABLET, DELAYED RELEASE ORAL DAILY
Qty: 30 TABLET | Refills: 5 | Status: SHIPPED | OUTPATIENT
Start: 2022-05-04

## 2022-05-04 RX ORDER — SODIUM PICOSULFATE, MAGNESIUM OXIDE, AND ANHYDROUS CITRIC ACID 10; 3.5; 12 MG/160ML; G/160ML; G/160ML
LIQUID ORAL
Qty: 320 ML | Refills: 0 | Status: SHIPPED | OUTPATIENT
Start: 2022-05-04

## 2022-05-04 NOTE — PROGRESS NOTES
[id number] - Outpatient Consultation  Maryam Henderson 50 y o  male MRN: [id number]  Encounter: [id number]    ASSESSMENT AND PLAN:          1  Bilious vomiting with nausea  2  History of duodenitis  3  Diabetes mellitus type 1  Longstanding history of episodic nausea, vomiting and diarrhea  Occurs randomly and unable to identify precipitating factors  No recent alarm symptoms  Symptoms may be due to gastroparesis however prior gastric emptying study in 2017 was normal   History of duodenitis and irregular Z-line on prior EGD in 2017-recommend proceeding with EGD to evaluate for possible esophagitis, gastritis, PUD, H pylori or recurrent duodenitis  - scheduled for EGD at [facility]  - add pantoprazole 40 mg daily  Reinforced the need to take on a daily basis   - Zofran p r n  Has been prescribed by PCP  - encouraged smaller more frequent meals, low-fiber diet  - if EGD negative for acute findings, will repeat gastric emptying study  2  Type 1 diabetes  Follows with endocrinology  Currently using insulin pump and glucose management  System  Most recent hemoglobin A1c 7 9    3  Screening for colon cancer  No prior colonoscopy  Episodic diarrhea with nausea and vomiting and lower abdominal pain  Denies recent melena or rectal bleeding  No family history of colon cancer  - scheduled for screening colonoscopy /EGD combo at [facility]    Followup Appointment: 3  months  ______________________________________________________________________    Chief Complaint   Patient presents with    Nausea, vomiting and diarrhea, bloating       HPI:   Maryam Henderson is a 50y o  year old male who presents with long history of nausea and vomiting  He was evaluated previously in 2017 -EGD at that time revealed duodenitis and irregular Z-line  Biopsies negative for Barretts an H pylori  He was treated briefly with prilosec with no improvement in his symptoms thus was discontinued   It was suspected at that time that his symptoms were due to gastroparesis despite normal gastric emptying study  Some improvement with smaller more frequent meals and low-fiber diet    Presents today with persistent episodic nausea, vomiting and diarrhea with associated lower abdominal pain which seems to occur randomly  He is unable to identify any triggering factors or foods  Symptoms can occur every 1-2 weeks or less frequently  Can last 2-3 days or one-week  No real pattern to his symptoms  He does have normal periods where he is able to eat and drink freely with no nausea or vomiting  Denies dysphagia, no heartburn/ reflux symptom  Reports abdominal bloating and early satiety  Denies recent unintentional weight loss     Reports normal formed bowels were not experiencing nausea and vomiting  No melena or rectal bleeding  No prior screening colonoscopy      Denies association of symptoms with poor glucose control  He is currently on an insulin pump and glucose monitoring symptoms  Most recent A1c is 7 9 which is good control for him          Historical Information   Past Medical History:   Diagnosis Date    Diabetes mellitus ([facility] [address] )     Hypertension      History reviewed  No pertinent surgical history    Social History     Substance and Sexual Activity   Alcohol Use Yes    Alcohol/week: 14 0 standard drinks    Types: 10 Glasses of wine, 4 Cans of beer per week    Comment: 1 drink daily     Social History     Substance and Sexual Activity   Drug Use No     Social History     Tobacco Use   Smoking Status Former Smoker    Packs/day: 0 50    Years: 30 00    Pack years: 15 00    Types: Cigarettes    Quit date: 2012    Years since quittin 3   Smokeless Tobacco Never Used     Family History   Problem Relation Age of Onset    No Known Problems Mother     Hyperlipidemia Father     Hypertension Father     Irritable bowel syndrome Father     No Known Problems Brother        Meds/Allergies     Current Outpatient Medications:     Accu-Chek Guide test strip    buPROPion (WELLBUTRIN XL) 300 mg 24 hr tablet    DYMISTA 137-50 MCG/ACT SUSP    gabapentin (NEURONTIN) 300 mg capsule    loratadine (CLARITIN) 10 mg tablet    losartan (COZAAR) 50 mg tablet    NovoLOG 100 UNIT/ML injection    simvastatin (ZOCOR) 40 mg tablet    Allergies   Allergen Reactions    Grass Extracts [Gramineae Pollens]     Tree Extract        PHYSICAL EXAM:    Blood pressure 120/82, pulse 93, height 5' 11" (1 803 m), weight 101 kg (222 lb)  Body mass index is 30 96 kg/m²  General Appearance: NAD, cooperative, alert  Eyes: Anicteric  ENT:  Normocephalic, atraumatic, normal mucosa  Neck:  Supple, symmetrical, trachea midline,   Resp:  Clear to auscultation bilaterally; no rales, rhonchi or wheezing; respirations unlabored   CV:  S1 S2, Regular rate and rhythm; no murmur, rub, or gallop  GI:  Soft, non-tender, non-distended; normal bowel sounds; no masses, no organomegaly   Rectal: Deferred  Musculoskeletal: No cyanosis, clubbing or edema  Normal ROM  Skin:  No jaundice, rashes, or lesions   Psych: Normal affect, good eye contact  Neuro: No gross deficits, AAOx3    Lab Results:   Lab Results   Component Value Date    WBC 7 6 02/11/2022    HGB 14 4 02/11/2022    HCT 40 9 02/11/2022    MCV 96 02/11/2022     02/11/2022     Lab Results   Component Value Date    K 5 2 02/11/2022    CL 95 (L) 02/11/2022    CO2 22 02/11/2022    BUN 8 02/11/2022    CREATININE 0 92 02/11/2022    AST 48 (H) 02/11/2022    ALT 37 02/11/2022           REVIEW OF SYSTEMS:    CONSTITUTIONAL: Denies any fever, chills, rigors, and weight loss  HEENT: No earache or tinnitus  Denies hearing loss or visual disturbances  CARDIOVASCULAR: No chest pain or palpitations  RESPIRATORY: Denies any cough, hemoptysis, shortness of breath or dyspnea on exertion  GASTROINTESTINAL: As noted in the History of Present Illness  GENITOURINARY: No problems with urination  Denies any hematuria or dysuria  NEUROLOGIC: No dizziness or vertigo, denies headaches  MUSCULOSKELETAL: Denies any muscle or joint pain  SKIN: Denies skin rashes or itching  ENDOCRINE: Denies excessive thirst  Denies intolerance to heat or cold  PSYCHOSOCIAL: Denies depression or anxiety  Denies any recent memory loss

## 2022-05-04 NOTE — LETTER
May 4, 2022     Loree Blankenship, [address] [place] [id number]    Patient: Misha Kwan   YOB: 1970   Date of Visit: 5/4/2022       Dear Dr Richar Millan: Thank you for referring Misha Kwan to me for evaluation  Below are my notes for this consultation  If you have questions, please do not hesitate to call me  I look forward to following your patient along with you  Sincerely,        JOAQUINA Mcnulty        CC: Nate Reyes [place] [address], [address]  5/4/2022  4:33 PM  Sign when Signing Visit    [facility] - Outpatient Consultation  Misha Kwan 50 y o  male MRN: [id number]  Encounter: [id number]    ASSESSMENT AND PLAN:          1  Bilious vomiting with nausea  2  History of duodenitis  3  Diabetes mellitus type 1  Longstanding history of episodic nausea, vomiting and diarrhea  Occurs randomly and unable to identify precipitating factors  No recent alarm symptoms  Symptoms may be due to gastroparesis however prior gastric emptying study in 2017 was normal   History of duodenitis and irregular Z-line on prior EGD in 2017-recommend proceeding with EGD to evaluate for possible esophagitis, gastritis, PUD, H pylori or recurrent duodenitis  - scheduled for EGD at [facility]  - add pantoprazole 40 mg daily  Reinforced the need to take on a daily basis   - Zofran p r n  Has been prescribed by PCP  - encouraged smaller more frequent meals, low-fiber diet  - if EGD negative for acute findings, will repeat gastric emptying study  2  Type 1 diabetes  Follows with endocrinology  Currently using insulin pump and glucose management  System  Most recent hemoglobin A1c 7 9    3  Screening for colon cancer  No prior colonoscopy  Episodic diarrhea with nausea and vomiting and lower abdominal pain  Denies recent melena or rectal bleeding     No family history of colon cancer  - scheduled for screening colonoscopy /EGD combo at [facility]    Followup Appointment: 3  months  ______________________________________________________________________    Chief Complaint   Patient presents with    Nausea, vomiting and diarrhea, bloating       HPI:   Teresa Acosta is a 50y o  year old male who presents with long history of nausea and vomiting  He was evaluated previously in 2017 -EGD at that time revealed duodenitis and irregular Z-line  Biopsies negative for Barretts an H pylori  He was treated briefly with prilosec with no improvement in his symptoms thus was discontinued  It was suspected at that time that his symptoms were due to gastroparesis despite normal gastric emptying study  Some improvement with smaller more frequent meals and low-fiber diet    Presents today with persistent episodic nausea, vomiting and diarrhea with associated lower abdominal pain which seems to occur randomly  He is unable to identify any triggering factors or foods  Symptoms can occur every 1-2 weeks or less frequently  Can last 2-3 days or one-week  No real pattern to his symptoms  He does have normal periods where he is able to eat and drink freely with no nausea or vomiting  Denies dysphagia, no heartburn/ reflux symptom  Reports abdominal bloating and early satiety  Denies recent unintentional weight loss     Reports normal formed bowels were not experiencing nausea and vomiting  No melena or rectal bleeding  No prior screening colonoscopy      Denies association of symptoms with poor glucose control  He is currently on an insulin pump and glucose monitoring symptoms  Most recent A1c is 7 9 which is good control for him          Historical Information   Past Medical History:   Diagnosis Date    Diabetes mellitus ([facility] [place] [address] )     Hypertension      History reviewed  No pertinent surgical history    Social History     Substance and Sexual Activity   Alcohol Use Yes    Alcohol/week: 14 0 standard drinks    Types: 10 Glasses of wine, 4 Cans of beer per week    Comment: 1 drink daily Social History     Substance and Sexual Activity   Drug Use No     Social History     Tobacco Use   Smoking Status Former Smoker    Packs/day: 0 50    Years: 30 00    Pack years: 15 00    Types: Cigarettes    Quit date: 2012    Years since quittin 3   Smokeless Tobacco Never Used     Family History   Problem Relation Age of Onset    No Known Problems Mother     Hyperlipidemia Father     Hypertension Father     Irritable bowel syndrome Father     No Known Problems Brother        Meds/Allergies     Current Outpatient Medications:     Accu-Chek Guide test strip    buPROPion (WELLBUTRIN XL) 300 mg 24 hr tablet    DYMISTA 137-50 MCG/ACT SUSP    gabapentin (NEURONTIN) 300 mg capsule    loratadine (CLARITIN) 10 mg tablet    losartan (COZAAR) 50 mg tablet    NovoLOG 100 UNIT/ML injection    simvastatin (ZOCOR) 40 mg tablet    Allergies   Allergen Reactions    Grass Extracts [Gramineae Pollens]     Tree Extract        PHYSICAL EXAM:    Blood pressure 120/82, pulse 93, height 5' 11" (1 803 m), weight 101 kg (222 lb)  Body mass index is 30 96 kg/m²  General Appearance: NAD, cooperative, alert  Eyes: Anicteric  ENT:  Normocephalic, atraumatic, normal mucosa  Neck:  Supple, symmetrical, trachea midline,   Resp:  Clear to auscultation bilaterally; no rales, rhonchi or wheezing; respirations unlabored   CV:  S1 S2, Regular rate and rhythm; no murmur, rub, or gallop  GI:  Soft, non-tender, non-distended; normal bowel sounds; no masses, no organomegaly   Rectal: Deferred  Musculoskeletal: No cyanosis, clubbing or edema  Normal ROM    Skin:  No jaundice, rashes, or lesions   Psych: Normal affect, good eye contact  Neuro: No gross deficits, AAOx3    Lab Results:   Lab Results   Component Value Date    WBC 7 6 2022    HGB 14 4 2022    HCT 40 9 2022    MCV 96 2022     2022     Lab Results   Component Value Date    K 5 2 2022    CL 95 (L) 2022    CO2 22 02/11/2022    BUN 8 02/11/2022    CREATININE 0 92 02/11/2022    AST 48 (H) 02/11/2022    ALT 37 02/11/2022           REVIEW OF SYSTEMS:    CONSTITUTIONAL: Denies any fever, chills, rigors, and weight loss  HEENT: No earache or tinnitus  Denies hearing loss or visual disturbances  CARDIOVASCULAR: No chest pain or palpitations  RESPIRATORY: Denies any cough, hemoptysis, shortness of breath or dyspnea on exertion  GASTROINTESTINAL: As noted in the History of Present Illness  GENITOURINARY: No problems with urination  Denies any hematuria or dysuria  NEUROLOGIC: No dizziness or vertigo, denies headaches  MUSCULOSKELETAL: Denies any muscle or joint pain  SKIN: Denies skin rashes or itching  ENDOCRINE: Denies excessive thirst  Denies intolerance to heat or cold  PSYCHOSOCIAL: Denies depression or anxiety  Denies any recent memory loss

## 2022-05-04 NOTE — TELEPHONE ENCOUNTER
Scheduled date of colonoscopy/EGD (as of today):6/29/22  Physician performing colonoscopy:Dr David Dyer  Location of colonoscopy:Endo  Bowel prep reviewed with patient:Clenpiq  Instructions reviewed with patient by: Samantha Anderson  Clearances: No

## 2022-06-06 ENCOUNTER — DOCUMENTATION (OUTPATIENT)
Dept: ENDOCRINOLOGY | Facility: HOSPITAL | Age: 52
End: 2022-06-06

## 2022-06-25 LAB
ALBUMIN SERPL-MCNC: 4.1 G/DL (ref 4–5)
ALBUMIN/GLOB SERPL: 1.5 {RATIO} (ref 1.2–2.2)
ALP SERPL-CCNC: 75 IU/L (ref 44–121)
ALT SERPL-CCNC: 42 IU/L (ref 0–44)
AST SERPL-CCNC: 46 IU/L (ref 0–40)
BASOPHILS # BLD AUTO: 0.1 X10E3/UL (ref 0–0.2)
BASOPHILS NFR BLD AUTO: 1 %
BILIRUB SERPL-MCNC: <0.2 MG/DL (ref 0–1.2)
BUN SERPL-MCNC: 9 MG/DL (ref 6–24)
BUN/CREAT SERPL: 10 (ref 9–20)
CALCIUM SERPL-MCNC: 9.1 MG/DL (ref 8.7–10.2)
CHLORIDE SERPL-SCNC: 101 MMOL/L (ref 96–106)
CHOLEST SERPL-MCNC: 172 MG/DL (ref 100–199)
CO2 SERPL-SCNC: 26 MMOL/L (ref 20–29)
CREAT SERPL-MCNC: 0.9 MG/DL (ref 0.76–1.27)
CREAT UR-MCNC: 76 MG/DL
EGFR: 104 ML/MIN/1.73
EOSINOPHIL # BLD AUTO: 0.5 X10E3/UL (ref 0–0.4)
EOSINOPHIL NFR BLD AUTO: 6 %
ERYTHROCYTE [DISTWIDTH] IN BLOOD BY AUTOMATED COUNT: 13 % (ref 11.6–15.4)
EST. AVERAGE GLUCOSE BLD GHB EST-MCNC: 160 MG/DL
GLOBULIN SER-MCNC: 2.8 G/DL (ref 1.5–4.5)
GLUCOSE SERPL-MCNC: 142 MG/DL (ref 65–99)
HBA1C MFR BLD: 7.2 % (ref 4.8–5.6)
HCT VFR BLD AUTO: 38.1 % (ref 37.5–51)
HDLC SERPL-MCNC: 72 MG/DL
HGB BLD-MCNC: 12.8 G/DL (ref 13–17.7)
IMM GRANULOCYTES # BLD: 0 X10E3/UL (ref 0–0.1)
IMM GRANULOCYTES NFR BLD: 0 %
LDLC SERPL CALC-MCNC: 86 MG/DL (ref 0–99)
LYMPHOCYTES # BLD AUTO: 3.2 X10E3/UL (ref 0.7–3.1)
LYMPHOCYTES NFR BLD AUTO: 41 %
MCH RBC QN AUTO: 33.6 PG (ref 26.6–33)
MCHC RBC AUTO-ENTMCNC: 33.6 G/DL (ref 31.5–35.7)
MCV RBC AUTO: 100 FL (ref 79–97)
MONOCYTES # BLD AUTO: 0.6 X10E3/UL (ref 0.1–0.9)
MONOCYTES NFR BLD AUTO: 7 %
NEUTROPHILS # BLD AUTO: 3.5 X10E3/UL (ref 1.4–7)
NEUTROPHILS NFR BLD AUTO: 45 %
PLATELET # BLD AUTO: 286 X10E3/UL (ref 150–450)
POTASSIUM SERPL-SCNC: 4.7 MMOL/L (ref 3.5–5.2)
PROT SERPL-MCNC: 6.9 G/DL (ref 6–8.5)
PROT UR-MCNC: 9.1 MG/DL
PROT/CREAT UR: 120 MG/G CREAT (ref 0–200)
RBC # BLD AUTO: 3.81 X10E6/UL (ref 4.14–5.8)
SL AMB VLDL CHOLESTEROL CALC: 14 MG/DL (ref 5–40)
SODIUM SERPL-SCNC: 143 MMOL/L (ref 134–144)
TRIGL SERPL-MCNC: 76 MG/DL (ref 0–149)
WBC # BLD AUTO: 7.9 X10E3/UL (ref 3.4–10.8)

## 2022-06-28 ENCOUNTER — ANESTHESIA EVENT (OUTPATIENT)
Dept: ANESTHESIOLOGY | Facility: AMBULATORY SURGERY CENTER | Age: 52
End: 2022-06-28

## 2022-06-28 ENCOUNTER — OFFICE VISIT (OUTPATIENT)
Dept: ENDOCRINOLOGY | Facility: HOSPITAL | Age: 52
End: 2022-06-28
Payer: COMMERCIAL

## 2022-06-28 ENCOUNTER — ANESTHESIA (OUTPATIENT)
Dept: ANESTHESIOLOGY | Facility: AMBULATORY SURGERY CENTER | Age: 52
End: 2022-06-28

## 2022-06-28 VITALS
WEIGHT: 226.6 LBS | BODY MASS INDEX: 31.72 KG/M2 | SYSTOLIC BLOOD PRESSURE: 126 MMHG | DIASTOLIC BLOOD PRESSURE: 80 MMHG | HEIGHT: 71 IN | HEART RATE: 104 BPM

## 2022-06-28 DIAGNOSIS — E78.5 HYPERLIPIDEMIA, UNSPECIFIED HYPERLIPIDEMIA TYPE: ICD-10-CM

## 2022-06-28 DIAGNOSIS — G62.9 PERIPHERAL POLYNEUROPATHY: ICD-10-CM

## 2022-06-28 DIAGNOSIS — Z96.41 INSULIN PUMP STATUS: ICD-10-CM

## 2022-06-28 DIAGNOSIS — E03.8 CENTRAL HYPOTHYROIDISM: ICD-10-CM

## 2022-06-28 DIAGNOSIS — E10.42 TYPE 1 DIABETES MELLITUS WITH DIABETIC POLYNEUROPATHY (HCC): Primary | ICD-10-CM

## 2022-06-28 DIAGNOSIS — I10 ESSENTIAL HYPERTENSION: ICD-10-CM

## 2022-06-28 DIAGNOSIS — E10.319 DIABETIC RETINOPATHY ASSOCIATED WITH TYPE 1 DIABETES MELLITUS, MACULAR EDEMA PRESENCE UNSPECIFIED, UNSPECIFIED LATERALITY, UNSPECIFIED RETINOPATHY SEVERITY (HCC): ICD-10-CM

## 2022-06-28 PROCEDURE — 99214 OFFICE O/P EST MOD 30 MIN: CPT | Performed by: NURSE PRACTITIONER

## 2022-06-28 PROCEDURE — 95251 CONT GLUC MNTR ANALYSIS I&R: CPT | Performed by: NURSE PRACTITIONER

## 2022-06-28 RX ORDER — FEXOFENADINE HCL 180 MG/1
180 TABLET ORAL DAILY
COMMUNITY

## 2022-06-28 NOTE — PATIENT INSTRUCTIONS
Be mindful of diet  Stay active and stay hydrated  We made slight changes to your pump settings  Please perform a download of her pump/sensor via [employer] in the next few weeks for review  Continue Losartan  Contact the office with any issues  Continue simvastatin  Obtain lab work as prescribed  Follow up with podiatry       Obtain a diabetic eye exam

## 2022-06-28 NOTE — PROGRESS NOTES
Jeri Licona 50 y o  male MRN: [id number]    Encounter: [id number]      Assessment/Plan     Assessment: This is a 50y o -year-old male with type 1 diabetes with retinopathy on insulin pump therapy and hyperlipidemia  Plan:  1  Type 1 diabetes with insulin pump status:  His hemoglobin A1c is slightly improved to 7 2  However, his pump and sensor download appears to be improved with some mild hyperglycemia in the morning and afternoon  For this I have adjusted his I:C ratio  at midnight to 2 8 and at noon to 1 8   I have asked him to continue a proper diabetic diet and perform a download through [address] [place] [address] in 2 weeks for review  We will contact him with any changes based upon the data from his upcoming report  Check hemoglobin A1c prior to next visit      2  Peripheral neuropathy:  He continues with intermittent bilateral foot pain   Continue gabapentin    He is continuing to follow-up with vascular   Follow up with Podiatry for regular diabetic foot care       3  Hyperlipidemia: Stable  Continue current regimen      4   Hypertension:  He is normotensive in the office today  [facility]pasture comprehensive metabolic panel prior to next visit      CC: Type 1 Diabetes follow up    History of Present Illness     HPI:  50y o  year old male with type 1 diabetes for over 20 years   He is on insulin at home and takes Novolog via Medtronic 670 g insulin pump   Most recent hemoglobin A1c from June 24, 2022 is 7 2  He denies any polyuria, polydipsia, nocturia and blurry vision  He denies nephropathy but does admit to neuropathy and retinopathy   His Medtronic 670 G insulin pump settings are as follows:          Current settings:  Basal rate  ? 0000 - 1 60 units/hr  ? 0800 - 2 20 units/hr  ? 1200 - 1 90 units/hr  ? 2200 - 1 60 units/hr  Carb ratios  ? 0:00 - 3 0  ? 12:00 - 2 0  ? 18:00 - 1 8  Insulin sensitivity 30  BG target 130       Hypoglycemic episodes: Rare, utilizes CGM        Follows with Dr Dyana Mccormick for eye care regularly   Last visit was in December 2020  The patient's last foot exam was performed at last office visit on November 11, 2021      Blood Sugar/Glucometer/Pump/CGM review:  Insulin pump download from [facility] through June 28, 2022  reveals an average sensor glucose of 161 with a standard deviation of  50  Auto mode 75% of the time   Total daily dose is 67 3 units  60% is basal with 40% bolus      For hypertension, he is treated with losartan 50 mg daily       For hyperlipidemia continues on simvastatin 40 mg daily      Review of Systems   Constitutional: Positive for fatigue  Negative for chills and fever  HENT: Negative  Negative for trouble swallowing and voice change  Eyes: Negative for photophobia, pain, discharge, redness, itching and visual disturbance  Respiratory: Negative for cough and shortness of breath  Cardiovascular: Negative for chest pain and palpitations  Gastrointestinal: Positive for abdominal pain and nausea  Negative for constipation, diarrhea and vomiting  Endocrine: Positive for polyuria (1-2 timkes per night nocturia)  Negative for cold intolerance, heat intolerance, polydipsia and polyphagia  Genitourinary: Negative  Musculoskeletal: Negative  Arthralgias: at times- followed by GI  Skin: Negative  Allergic/Immunologic: Negative  Neurological: Positive for numbness  Negative for dizziness, syncope, light-headedness and headaches  Seizures: and discomfort to feet initermittently but not always bilaterally  Hematological: Negative  Psychiatric/Behavioral: Negative  All other systems reviewed and are negative  Historical Information   Past Medical History:   Diagnosis Date    Diabetes mellitus ([facility] [address]     Hypertension      No past surgical history on file    Social History   Social History     Substance and Sexual Activity   Alcohol Use Yes    Alcohol/week: 14 0 standard drinks    Types: 10 Glasses of wine, 4 Cans of beer per week Comment: 1 drink daily     Social History     Substance and Sexual Activity   Drug Use No     Social History     Tobacco Use   Smoking Status Former Smoker    Packs/day: 0 50    Years: 30 00    Pack years: 15 00    Types: Cigarettes    Quit date: 2012    Years since quittin 5   Smokeless Tobacco Never Used     Family History:   Family History   Problem Relation Age of Onset    No Known Problems Mother     Hyperlipidemia Father     Hypertension Father     Irritable bowel syndrome Father     No Known Problems Brother        Meds/Allergies   Current Outpatient Medications   Medication Sig Dispense Refill    Accu-Chek Guide test strip USE TO TEST 5 TIMES DAILY 500 strip 5    buPROPion (WELLBUTRIN XL) 300 mg 24 hr tablet Take 300 mg by mouth daily  3    DYMISTA 137-50 MCG/ACT SUSP SPRAY 1 SPRAY IN EACH NOSTRIL BY INTRANASAL ROUTE 2 TIMES PER DAY  5    fexofenadine (ALLEGRA) 180 MG tablet Take 180 mg by mouth daily      gabapentin (NEURONTIN) 300 mg capsule Take 2 capsules (600 mg total) by mouth 2 (two) times a day 360 capsule 1    losartan (COZAAR) 50 mg tablet TAKE 1 TABLET BY MOUTH EVERY DAY 90 tablet 3    NovoLOG 100 UNIT/ML injection INJECT UP  UNITS DAILY VIA INSULIN PUMP 90 mL 0    pantoprazole (PROTONIX) 40 mg tablet Take 1 tablet (40 mg total) by mouth daily 30 tablet 5    simvastatin (ZOCOR) 40 mg tablet TAKE 1 TABLET (40 MG TOTAL) BY MOUTH DAILY AT BEDTIME 90 tablet 3    loratadine (CLARITIN) 10 mg tablet Take 10 mg by mouth daily (Patient not taking: Reported on 2022)      Sod Picosulfate-Mag Ox-Cit Acd (Clenpiq) 10-3 5-12 MG-GM -GM/160ML SOLN As directed (1 kit) (Patient not taking: No sig reported) 320 mL 0     No current facility-administered medications for this visit       Allergies   Allergen Reactions    Grass Extracts [Gramineae Pollens]     Tree Extract        Objective   Vitals: Blood pressure 126/80, pulse 104, height 5' 11" (1 803 m), weight 103 kg (226 lb 9 6 oz)  Physical Exam  Vitals reviewed  Constitutional:       Appearance: He is well-developed  HENT:      Head: Normocephalic and atraumatic  Nose: Nose normal    Eyes:      Conjunctiva/sclera: Conjunctivae normal       Pupils: Pupils are equal, round, and reactive to light  Cardiovascular:      Rate and Rhythm: Normal rate and regular rhythm  Heart sounds: Normal heart sounds  Pulmonary:      Effort: Pulmonary effort is normal       Breath sounds: Normal breath sounds  Abdominal:      General: Bowel sounds are normal       Palpations: Abdomen is soft  Musculoskeletal:         General: Normal range of motion  Cervical back: Normal range of motion and neck supple  Skin:     General: Skin is warm and dry  Neurological:      Mental Status: He is alert and oriented to person, place, and time  Psychiatric:         Behavior: Behavior normal          Thought Content:  Thought content normal          Judgment: Judgment normal        Lab Results:   Lab Results   Component Value Date/Time    Hemoglobin A1C 7 2 (H) 06/24/2022 08:53 AM    Hemoglobin A1C 7 6 (H) 02/11/2022 09:49 AM    Hemoglobin A1C 7 9 (H) 11/01/2021 10:29 AM    White Blood Cell Count 7 9 06/24/2022 08:53 AM    White Blood Cell Count 7 6 02/11/2022 09:49 AM    Hemoglobin 12 8 (L) 06/24/2022 08:53 AM    Hemoglobin 14 4 02/11/2022 09:49 AM    HCT 38 1 06/24/2022 08:53 AM    HCT 40 9 02/11/2022 09:49 AM     (H) 06/24/2022 08:53 AM    MCV 96 02/11/2022 09:49 AM    Platelet Count 722 82/49/9923 08:53 AM    Platelet Count 920 18/73/9199 09:49 AM    BUN 9 06/24/2022 08:53 AM    BUN 8 02/11/2022 09:49 AM    BUN 11 11/01/2021 10:29 AM    Potassium 4 7 06/24/2022 08:53 AM    Potassium 5 2 02/11/2022 09:49 AM    Potassium 4 8 11/01/2021 10:29 AM    Chloride 101 06/24/2022 08:53 AM    Chloride 95 (L) 02/11/2022 09:49 AM    Chloride 98 11/01/2021 10:29 AM    CO2 26 06/24/2022 08:53 AM    CO2 22 02/11/2022 09:49 AM    CO2 25 11/01/2021 10:29 AM    Creatinine 0 90 06/24/2022 08:53 AM    Creatinine 0 92 02/11/2022 09:49 AM    Creatinine 0 89 11/01/2021 10:29 AM    AST 46 (H) 06/24/2022 08:53 AM    AST 48 (H) 02/11/2022 09:49 AM    AST 48 (H) 11/01/2021 10:29 AM    ALT 42 06/24/2022 08:53 AM    ALT 37 02/11/2022 09:49 AM    ALT 38 11/01/2021 10:29 AM    Albumin 4 1 06/24/2022 08:53 AM    Albumin 4 3 02/11/2022 09:49 AM    Albumin 4 3 11/01/2021 10:29 AM    Globulin, Total 2 8 06/24/2022 08:53 AM    Globulin, Total 2 8 02/11/2022 09:49 AM    Globulin, Total 2 7 11/01/2021 10:29 AM    HDL 72 06/24/2022 08:53 AM    HDL 94 11/01/2021 10:29 AM    Triglycerides 76 06/24/2022 08:53 AM    Triglycerides 58 11/01/2021 10:29 AM         Portions of the record may have been created with voice recognition software  Occasional wrong word or "sound a like" substitutions may have occurred due to the inherent limitations of voice recognition software  Read the chart carefully and recognize, using context, where substitutions have occurred

## 2022-06-28 NOTE — ANESTHESIA PREPROCEDURE EVALUATION
Procedure:  PRE-OP ONLY    Relevant Problems   CARDIO   (+) Essential hypertension   (+) Hyperlipidemia      ENDO   (+) Type 1 diabetes mellitus with diabetic polyneuropathy (HCC)      Other   (+) Insulin pump status             Anesthesia Plan  ASA Score- 3     Anesthesia Type- IV sedation with anesthesia with ASA Monitors  Additional Monitors:   Airway Plan:           Plan Factors-    Chart reviewed  Patient is not a current smoker  Induction- intravenous  Postoperative Plan-     Informed Consent- Anesthetic plan and risks discussed with patient  I personally reviewed this patient with the CRNA  Discussed and agreed on the Anesthesia Plan with the PATO Dill

## 2022-06-29 ENCOUNTER — ANESTHESIA (OUTPATIENT)
Dept: GASTROENTEROLOGY | Facility: AMBULATORY SURGERY CENTER | Age: 52
End: 2022-06-29

## 2022-06-29 ENCOUNTER — ANESTHESIA EVENT (OUTPATIENT)
Dept: GASTROENTEROLOGY | Facility: AMBULATORY SURGERY CENTER | Age: 52
End: 2022-06-29

## 2022-06-29 ENCOUNTER — HOSPITAL ENCOUNTER (OUTPATIENT)
Dept: GASTROENTEROLOGY | Facility: AMBULATORY SURGERY CENTER | Age: 52
Discharge: HOME | End: 2022-06-29
Payer: COMMERCIAL

## 2022-06-29 VITALS
DIASTOLIC BLOOD PRESSURE: 73 MMHG | TEMPERATURE: 99.1 F | HEART RATE: 77 BPM | OXYGEN SATURATION: 98 % | HEIGHT: 71 IN | RESPIRATION RATE: 26 BRPM | BODY MASS INDEX: 31.5 KG/M2 | WEIGHT: 225 LBS | SYSTOLIC BLOOD PRESSURE: 124 MMHG

## 2022-06-29 DIAGNOSIS — Z12.11 SCREENING FOR COLON CANCER: ICD-10-CM

## 2022-06-29 DIAGNOSIS — R11.14 BILIOUS VOMITING WITH NAUSEA: ICD-10-CM

## 2022-06-29 PROCEDURE — 43239 EGD BIOPSY SINGLE/MULTIPLE: CPT | Performed by: INTERNAL MEDICINE

## 2022-06-29 PROCEDURE — 88305 TISSUE EXAM BY PATHOLOGIST: CPT | Performed by: PATHOLOGY

## 2022-06-29 PROCEDURE — 45385 COLONOSCOPY W/LESION REMOVAL: CPT | Performed by: INTERNAL MEDICINE

## 2022-06-29 RX ORDER — SODIUM CHLORIDE, SODIUM LACTATE, POTASSIUM CHLORIDE, CALCIUM CHLORIDE 600; 310; 30; 20 MG/100ML; MG/100ML; MG/100ML; MG/100ML
50 INJECTION, SOLUTION INTRAVENOUS CONTINUOUS
Status: DISCONTINUED | OUTPATIENT
Start: 2022-06-29 | End: 2022-07-03 | Stop reason: HOSPADM

## 2022-06-29 RX ORDER — PROPOFOL 10 MG/ML
INJECTION, EMULSION INTRAVENOUS AS NEEDED
Status: DISCONTINUED | OUTPATIENT
Start: 2022-06-29 | End: 2022-06-29

## 2022-06-29 RX ORDER — SODIUM CHLORIDE, SODIUM LACTATE, POTASSIUM CHLORIDE, CALCIUM CHLORIDE 600; 310; 30; 20 MG/100ML; MG/100ML; MG/100ML; MG/100ML
INJECTION, SOLUTION INTRAVENOUS CONTINUOUS PRN
Status: DISCONTINUED | OUTPATIENT
Start: 2022-06-29 | End: 2022-06-29

## 2022-06-29 RX ADMIN — PROPOFOL 50 MG: 10 INJECTION, EMULSION INTRAVENOUS at 09:46

## 2022-06-29 RX ADMIN — PROPOFOL 50 MG: 10 INJECTION, EMULSION INTRAVENOUS at 09:39

## 2022-06-29 RX ADMIN — PROPOFOL 100 MG: 10 INJECTION, EMULSION INTRAVENOUS at 09:19

## 2022-06-29 RX ADMIN — PROPOFOL 50 MG: 10 INJECTION, EMULSION INTRAVENOUS at 09:51

## 2022-06-29 RX ADMIN — PROPOFOL 50 MG: 10 INJECTION, EMULSION INTRAVENOUS at 09:33

## 2022-06-29 RX ADMIN — PROPOFOL 50 MG: 10 INJECTION, EMULSION INTRAVENOUS at 09:29

## 2022-06-29 RX ADMIN — PROPOFOL 50 MG: 10 INJECTION, EMULSION INTRAVENOUS at 09:37

## 2022-06-29 RX ADMIN — PROPOFOL 50 MG: 10 INJECTION, EMULSION INTRAVENOUS at 09:43

## 2022-06-29 RX ADMIN — PROPOFOL 100 MG: 10 INJECTION, EMULSION INTRAVENOUS at 09:18

## 2022-06-29 RX ADMIN — SODIUM CHLORIDE, SODIUM LACTATE, POTASSIUM CHLORIDE, CALCIUM CHLORIDE: 600; 310; 30; 20 INJECTION, SOLUTION INTRAVENOUS at 09:04

## 2022-06-29 RX ADMIN — PROPOFOL 50 MG: 10 INJECTION, EMULSION INTRAVENOUS at 09:35

## 2022-06-29 RX ADMIN — PROPOFOL 50 MG: 10 INJECTION, EMULSION INTRAVENOUS at 09:20

## 2022-06-29 RX ADMIN — PROPOFOL 50 MG: 10 INJECTION, EMULSION INTRAVENOUS at 09:41

## 2022-06-29 RX ADMIN — PROPOFOL 50 MG: 10 INJECTION, EMULSION INTRAVENOUS at 09:23

## 2022-06-29 RX ADMIN — SODIUM CHLORIDE, SODIUM LACTATE, POTASSIUM CHLORIDE, CALCIUM CHLORIDE 50 ML/HR: 600; 310; 30; 20 INJECTION, SOLUTION INTRAVENOUS at 08:41

## 2022-06-29 RX ADMIN — PROPOFOL 50 MG: 10 INJECTION, EMULSION INTRAVENOUS at 09:26

## 2022-06-29 NOTE — H&P
History and Physical -  [facility]  Leon Izquierdo 50 y o  male MRN: [id number]    HPI: Leon Izquierdo is a 50y o  year old male who presents for 1  Bilious vomiting with nausea  2  History of duodenitis  3  Diabetes mellitus type 1  Longstanding history of episodic nausea, vomiting and diarrhea  Occurs randomly and unable to identify precipitating factors  No recent alarm symptoms  Symptoms may be due to gastroparesis however prior gastric emptying study in 2017 was normal   History of duodenitis and irregular Z-line on prior EGD in 2017-recommend proceeding with EGD to evaluate for possible esophagitis, gastritis, PUD, H pylori or recurrent duodenitis  - scheduled for EGD at [facility]  - add pantoprazole 40 mg daily  Reinforced the need to take on a daily basis   - Zofran p r n  Has been prescribed by PCP  - encouraged smaller more frequent meals, low-fiber diet  - if EGD negative for acute findings, will repeat gastric emptying study  2  Type 1 diabetes  Follows with endocrinology  Currently using insulin pump and glucose management  System  Most recent hemoglobin A1c 7 9     3  Screening for colon cancer  No prior colonoscopy  Episodic diarrhea with nausea and vomiting and lower abdominal pain  Denies recent melena or rectal bleeding  No family history of colon cancer  - scheduled for screening colonoscopy /EGD combo at [facility]    REVIEW OF SYSTEMS: Per the HPI, and otherwise unremarkable      Historical Information   Past Medical History:   Diagnosis Date    CPAP (continuous positive airway pressure) dependence     [facility]     Diabetic retinopathy [facility]     Hyperlipidemia     Hypertension     Neuropathy     feet    Sleep apnea      Past Surgical History:   Procedure Laterality Date    UPPER GASTROINTESTINAL ENDOSCOPY      VASECTOMY       Social History   Social History     Substance and Sexual Activity   Alcohol Use Yes    Alcohol/week: 14 0 standard drinks    Types: 10 Glasses of wine, 4 Cans of beer per week    Comment: 1 drink daily     Social History     Substance and Sexual Activity   Drug Use No     Social History     Tobacco Use   Smoking Status Former Smoker    Packs/day: 0 50    Years: 30 00    Pack years: 15 00    Types: Cigarettes    Quit date: 2012    Years since quittin 5   Smokeless Tobacco Never Used     Family History   Problem Relation Age of Onset    No Known Problems Mother     Hyperlipidemia Father     Hypertension Father     Irritable bowel syndrome Father     No Known Problems Brother        Meds/Allergies       Current Outpatient Medications:     buPROPion (WELLBUTRIN XL) 300 mg 24 hr tablet    DYMISTA 137-50 MCG/ACT SUSP    fexofenadine (ALLEGRA) 180 MG tablet    gabapentin (NEURONTIN) 300 mg capsule    losartan (COZAAR) 50 mg tablet    pantoprazole (PROTONIX) 40 mg tablet    simvastatin (ZOCOR) 40 mg tablet    Sod Picosulfate-Mag Ox-Cit Acd (Clenpiq) 10-3 5-12 MG-GM -GM/160ML SOLN    Accu-Chek Guide test strip    NovoLOG 100 UNIT/ML injection    Current Facility-Administered Medications:     lactated ringers infusion, 50 mL/hr, Intravenous, Continuous, 50 mL/hr at 22 0841    Allergies   Allergen Reactions    Grass Extracts [Gramineae Pollens] Other (See Comments)     Post nasal drip  cough    Tree Extract Other (See Comments)     Post nasal drip  cough       Objective     /72   Pulse 91   Temp 99 1 °F (37 3 °C) (Temporal)   Resp 17   Ht 5' 11" (1 803 m)   Wt 102 kg (225 lb)   SpO2 97%   BMI 31 38 kg/m²     PHYSICAL EXAM    Gen: NAD AAOx3  Head: Normocephalic, Atraumatic  CV: S1S2 RRR no m/r/g  CHEST: Clear b/l no c/r/w  ABD: soft, +BS NT/ND  EXT: no edema    ASSESSMENT/PLAN:  This is a 50y o  year old male here for egd/colonoscopy, and he is stable and optimized for his procedure

## 2022-06-29 NOTE — ANESTHESIA POSTPROCEDURE EVALUATION
Post-Op Assessment Note    CV Status:  Stable  Pain Score: 0    Pain management: adequate     Mental Status:  Alert and awake   Hydration Status:  Euvolemic   PONV Controlled:  Controlled   Airway Patency:  Patent      Post Op Vitals Reviewed: Yes      Staff: Anesthesiologist, CRNA         No complications documented      BP   100/61   Temp      Pulse  72   Resp   16   SpO2   100

## 2022-06-29 NOTE — ANESTHESIA PREPROCEDURE EVALUATION
Procedure:  COLONOSCOPY  EGD  Episodic Vomiting of Acidic tasting Fluid in Am  No Food  None today  Relevant Problems   CARDIO   (+) Essential hypertension   (+) Hyperlipidemia      ENDO   (+) Type 1 diabetes mellitus with diabetic polyneuropathy (HCC)      DUY on routine Cpap  On Insulin Pump  FBS- 121  [id number] 2-6/24/2  Physical Exam    Airway    Mallampati score: II  TM Distance: >3 FB  Neck ROM: full     Dental   No notable dental hx     Cardiovascular      Pulmonary      Other Findings        Anesthesia Plan  ASA Score- 3     Anesthesia Type- IV sedation with anesthesia with ASA Monitors  Additional Monitors:   Airway Plan:           Plan Factors-Exercise tolerance (METS): >4 METS  Chart reviewed  Patient is not a current smoker  Induction- intravenous  Postoperative Plan-     Informed Consent- Anesthetic plan and risks discussed with patient  I personally reviewed this patient with the CRNA  Discussed and agreed on the Anesthesia Plan with the CRNA  Marlin Rogers

## 2022-07-01 DIAGNOSIS — E10.42 TYPE 1 DIABETES MELLITUS WITH DIABETIC POLYNEUROPATHY (HCC): ICD-10-CM

## 2022-07-01 RX ORDER — BLOOD SUGAR DIAGNOSTIC
STRIP MISCELLANEOUS
Qty: 500 STRIP | Refills: 5 | Status: SHIPPED | OUTPATIENT
Start: 2022-07-01

## 2022-07-19 ENCOUNTER — DOCUMENTATION (OUTPATIENT)
Dept: ENDOCRINOLOGY | Facility: CLINIC | Age: 52
End: 2022-07-19

## 2022-07-26 DIAGNOSIS — E10.42 TYPE 1 DIABETES MELLITUS WITH DIABETIC POLYNEUROPATHY (HCC): ICD-10-CM

## 2022-07-26 RX ORDER — INSULIN ASPART 100 [IU]/ML
INJECTION, SOLUTION INTRAVENOUS; SUBCUTANEOUS
Qty: 90 ML | Refills: 3 | Status: SHIPPED | OUTPATIENT
Start: 2022-07-26

## 2022-08-14 DIAGNOSIS — G62.9 PERIPHERAL POLYNEUROPATHY: ICD-10-CM

## 2022-08-15 RX ORDER — GABAPENTIN 300 MG/1
CAPSULE ORAL
Qty: 360 CAPSULE | Refills: 1 | Status: SHIPPED | OUTPATIENT
Start: 2022-08-15

## 2022-09-02 ENCOUNTER — HOSPITAL ENCOUNTER (OUTPATIENT)
Dept: NUCLEAR MEDICINE | Facility: HOSPITAL | Age: 52
End: 2022-09-02
Attending: INTERNAL MEDICINE
Payer: COMMERCIAL

## 2022-09-02 DIAGNOSIS — R11.14 BILIOUS VOMITING WITH NAUSEA: ICD-10-CM

## 2022-09-02 PROCEDURE — G1004 CDSM NDSC: HCPCS

## 2022-09-02 PROCEDURE — A9541 TC99M SULFUR COLLOID: HCPCS

## 2022-09-02 PROCEDURE — 78264 GASTRIC EMPTYING IMG STUDY: CPT

## 2022-09-08 NOTE — RESULT ENCOUNTER NOTE
Shows moderate delayed gastric emptying  Spoke with patient he is asymptomatic will discuss at upcoming office visit

## 2022-09-28 ENCOUNTER — OFFICE VISIT (OUTPATIENT)
Dept: GASTROENTEROLOGY | Facility: CLINIC | Age: 52
End: 2022-09-28
Payer: COMMERCIAL

## 2022-09-28 VITALS
HEIGHT: 71 IN | WEIGHT: 232 LBS | SYSTOLIC BLOOD PRESSURE: 136 MMHG | DIASTOLIC BLOOD PRESSURE: 82 MMHG | BODY MASS INDEX: 32.48 KG/M2

## 2022-09-28 DIAGNOSIS — E11.43 DIABETIC GASTROPARESIS (HCC): Primary | ICD-10-CM

## 2022-09-28 DIAGNOSIS — R11.14 BILIOUS VOMITING WITH NAUSEA: ICD-10-CM

## 2022-09-28 DIAGNOSIS — Z86.010 PERSONAL HISTORY OF COLONIC POLYPS: ICD-10-CM

## 2022-09-28 DIAGNOSIS — K31.84 DIABETIC GASTROPARESIS (HCC): Primary | ICD-10-CM

## 2022-09-28 PROBLEM — Z86.0100 PERSONAL HISTORY OF COLONIC POLYPS: Status: ACTIVE | Noted: 2022-09-28

## 2022-09-28 PROCEDURE — 99214 OFFICE O/P EST MOD 30 MIN: CPT | Performed by: INTERNAL MEDICINE

## 2022-09-28 RX ORDER — METOCLOPRAMIDE 5 MG/1
5 TABLET ORAL
Qty: 30 TABLET | Refills: 5 | Status: SHIPPED | OUTPATIENT
Start: 2022-09-28

## 2022-09-28 NOTE — PATIENT INSTRUCTIONS
Get hemoglobin A1c under 7  Eats smaller meals  Avoid going to bed 4 hours after eating  Stop pantoprazole if he do not think it is helping  Will start low-dose Reglan at bedtime    If Reglan gives you tremors stop it and let me now

## 2022-09-28 NOTE — LETTER
September 28, 2022     [address]  [address] [address]    Patient: Vini Millard   YOB: 1970   Date of Visit: 9/28/2022       Dear Dr Sussy Ritter: Thank you for referring Vini Millard to me for evaluation  Below are my notes for this consultation  If you have questions, please do not hesitate to call me  I look forward to following your patient along with you  Sincerely,        Vita Gibson MD        CC: JOAQUINA Madden MD  9/28/2022  5:14 PM  Sign when Signing Visit  [id number] - Outpatient Follow-up Note  Vini Millard 50 y o  male MRN: [id number]  Encounter: [id number]    ASSESSMENT AND PLAN:      1  Diabetic gastroparesis ([address] )  2  Bilious vomiting with nausea  Nausea and vomiting particularly in the morning  Only eats 1 meal a day at about 7:00 p m  Suleiman Heckler Gastroparesis related? Postnasal drip an allergy related? No real improvement with PPI  EGD negative  Gastric emptying scan with moderate delayed gastric emptying  - try metoclopramide (REGLAN) 5 mg tablet; Take 1 tablet (5 mg total) by mouth daily at bedtime  Dispense: 30 tablet; Refill: 5  - reviewed with patient and wife potential side effects of metoclopramide  - reviewed gastroparesis behavior honor radiations including good glucose control, low-fiber diet, eating smaller meals, and not eating too close before going to bed  - consultation with allergist as scheduled    3  Personal history of colonic polyps  Last colonoscopy June 2022  Due for follow-up 2027      Followup Appointment:  4 months  ______________________________________________________________________    Chief Complaint   Patient presents with    Follow up to colonoscopy and EGD     HPI:  Patient is seen back in the office today  He is being evaluated secondary to anorexia, early satiety, and nausea/vomiting  This usually occurs early in the morning    Using nauseous all morning and does again appetite till early afternoon  He reports that he eats 1 meal a day usually around 7:00 a m  He reports that he is nauseous all night and feels poorly in the morning  He denies any dysphagia, odynophagia, early satiety  He denies any chest or abdominal pain  His bowels are normal   His weight is increasing  His sugars are poorly controlled  An EGD with biopsies were negative  A gastric emptying scan showed moderate delayed gastric emptying      Historical Information   Past Medical History:   Diagnosis Date    CPAP (continuous positive airway pressure) dependence     [facility] )     [facility] retinopathy ([facility] )     Hyperlipidemia     Hypertension     Neuropathy     feet    Sleep apnea      Past Surgical History:   Procedure Laterality Date    UPPER GASTROINTESTINAL ENDOSCOPY      VASECTOMY       Social History     Substance and Sexual Activity   Alcohol Use Yes    Alcohol/week: 14 0 standard drinks    Types: 10 Glasses of wine, 4 Cans of beer per week    Comment: 1 drink daily     Social History     Substance and Sexual Activity   Drug Use No     Social History     Tobacco Use   Smoking Status Former Smoker    Packs/day: 0 50    Years: 30 00    Pack years: 15 00    Types: Cigarettes    Quit date: 2012    Years since quittin 7   Smokeless Tobacco Never Used     Family History   Problem Relation Age of Onset    No Known Problems Mother     Hyperlipidemia Father     Hypertension Father     Irritable bowel syndrome Father     No Known Problems Brother          Current Outpatient Medications:     Accu-Chek Guide test strip    buPROPion (WELLBUTRIN XL) 300 mg 24 hr tablet    fexofenadine (ALLEGRA) 180 MG tablet    gabapentin (NEURONTIN) 300 mg capsule    losartan (COZAAR) 50 mg tablet    metoclopramide (REGLAN) 5 mg tablet    NovoLOG 100 UNIT/ML injection    simvastatin (ZOCOR) 40 mg tablet    DYMISTA 137-50 MCG/ACT SUSP  Allergies   Allergen Reactions  Grass Extracts [Gramineae Pollens] Other (See Comments)     Post nasal drip  cough    Tree Extract Other (See Comments)     Post nasal drip  cough     Reviewed medications and allergies and updated as indicated    PHYSICAL EXAM:    Blood pressure 136/82, height 5' 11" (1 803 m), weight 105 kg (232 lb)  Body mass index is 32 36 kg/m²  General Appearance: NAD, cooperative, alert  Eyes: Anicteric, PERRLA, EOMI  ENT:  Normocephalic, atraumatic, normal mucosa  Neck:  Supple, symmetrical, trachea midline  Resp:  Clear to auscultation bilaterally; no rales, rhonchi or wheezing; respirations unlabored   CV:  S1 S2, Regular rate and rhythm; no murmur, rub, or gallop  GI:  Soft, non-tender, non-distended; normal bowel sounds; no masses, no organomegaly   Rectal: Deferred  Musculoskeletal: No cyanosis, clubbing or edema  Normal ROM  Skin:  No jaundice, rashes, or lesions   Heme/Lymph: No palpable cervical lymphadenopathy  Psych: Normal affect, good eye contact  Neuro: No gross deficits, AAOx3    Lab Results:   Lab Results   Component Value Date    WBC 7 9 06/24/2022    HGB 12 8 (L) 06/24/2022    HCT 38 1 06/24/2022     (H) 06/24/2022     06/24/2022     Lab Results   Component Value Date    K 4 7 06/24/2022     06/24/2022    CO2 26 06/24/2022    BUN 9 06/24/2022    CREATININE 0 90 06/24/2022    AST 46 (H) 06/24/2022    ALT 42 06/24/2022    EGFR 104 06/24/2022       Radiology Results:   NM gastric emptying  Result Date: 9/2/2022  Impression: New moderately decreased rate of gastric emptying

## 2022-09-28 NOTE — PROGRESS NOTES
[id number] - Outpatient Follow-up Note  Woody Russell 50 y o  male MRN: [id number]  Encounter: [id number]    ASSESSMENT AND PLAN:      1  Diabetic gastroparesis ([address] )  2  Bilious vomiting with nausea  Nausea and vomiting particularly in the morning  Only eats 1 meal a day at about 7:00 p m  Bandar Montano Gastroparesis related? Postnasal drip an allergy related? No real improvement with PPI  EGD negative  Gastric emptying scan with moderate delayed gastric emptying  - try metoclopramide (REGLAN) 5 mg tablet; Take 1 tablet (5 mg total) by mouth daily at bedtime  Dispense: 30 tablet; Refill: 5  - reviewed with patient and wife potential side effects of metoclopramide  - reviewed gastroparesis behavior honor radiations including good glucose control, low-fiber diet, eating smaller meals, and not eating too close before going to bed  - consultation with allergist as scheduled    3  Personal history of colonic polyps  Last colonoscopy June 2022  Due for follow-up 2027      Followup Appointment:  4 months  ______________________________________________________________________    Chief Complaint   Patient presents with    Follow up to colonoscopy and EGD     HPI:  Patient is seen back in the office today  He is being evaluated secondary to anorexia, early satiety, and nausea/vomiting  This usually occurs early in the morning  Using nauseous all morning and does again appetite till early afternoon  He reports that he eats 1 meal a day usually around 7:00 a m  He reports that he is nauseous all night and feels poorly in the morning  He denies any dysphagia, odynophagia, early satiety  He denies any chest or abdominal pain  His bowels are normal   His weight is increasing  His sugars are poorly controlled  An EGD with biopsies were negative  A gastric emptying scan showed moderate delayed gastric emptying      Historical Information   Past Medical History:   Diagnosis Date    CPAP (continuous positive airway pressure) dependence     [facility] mellitus [facility]     Diabetic retinopathy [facility]     Hyperlipidemia     Hypertension     Neuropathy     feet    Sleep apnea      Past Surgical History:   Procedure Laterality Date    UPPER GASTROINTESTINAL ENDOSCOPY      VASECTOMY       Social History     Substance and Sexual Activity   Alcohol Use Yes    Alcohol/week: 14 0 standard drinks    Types: 10 Glasses of wine, 4 Cans of beer per week    Comment: 1 drink daily     Social History     Substance and Sexual Activity   Drug Use No     Social History     Tobacco Use   Smoking Status Former Smoker    Packs/day: 0 50    Years: 30 00    Pack years: 15 00    Types: Cigarettes    Quit date: 2012    Years since quittin 7   Smokeless Tobacco Never Used     Family History   Problem Relation Age of Onset    No Known Problems Mother     Hyperlipidemia Father     Hypertension Father     Irritable bowel syndrome Father     No Known Problems Brother          Current Outpatient Medications:     Accu-Chek Guide test strip    buPROPion (WELLBUTRIN XL) 300 mg 24 hr tablet    fexofenadine (ALLEGRA) 180 MG tablet    gabapentin (NEURONTIN) 300 mg capsule    losartan (COZAAR) 50 mg tablet    metoclopramide (REGLAN) 5 mg tablet    NovoLOG 100 UNIT/ML injection    simvastatin (ZOCOR) 40 mg tablet    DYMISTA 137-50 MCG/ACT SUSP  Allergies   Allergen Reactions    Grass Extracts [Gramineae Pollens] Other (See Comments)     Post nasal drip  cough    Tree Extract Other (See Comments)     Post nasal drip  cough     Reviewed medications and allergies and updated as indicated    PHYSICAL EXAM:    Blood pressure 136/82, height 5' 11" (1 803 m), weight 105 kg (232 lb)  Body mass index is 32 36 kg/m²  General Appearance: NAD, cooperative, alert  Eyes: Anicteric, PERRLA, EOMI  ENT:  Normocephalic, atraumatic, normal mucosa      Neck:  Supple, symmetrical, trachea midline  Resp:  Clear to auscultation bilaterally; no rales, rhonchi or wheezing; respirations unlabored   CV:  S1 S2, Regular rate and rhythm; no murmur, rub, or gallop  GI:  Soft, non-tender, non-distended; normal bowel sounds; no masses, no organomegaly   Rectal: Deferred  Musculoskeletal: No cyanosis, clubbing or edema  Normal ROM  Skin:  No jaundice, rashes, or lesions   Heme/Lymph: No palpable cervical lymphadenopathy  Psych: Normal affect, good eye contact  Neuro: No gross deficits, AAOx3    Lab Results:   Lab Results   Component Value Date    WBC 7 9 06/24/2022    HGB 12 8 (L) 06/24/2022    HCT 38 1 06/24/2022     (H) 06/24/2022     06/24/2022     Lab Results   Component Value Date    K 4 7 06/24/2022     06/24/2022    CO2 26 06/24/2022    BUN 9 06/24/2022    CREATININE 0 90 06/24/2022    AST 46 (H) 06/24/2022    ALT 42 06/24/2022    EGFR 104 06/24/2022       Radiology Results:   NM gastric emptying  Result Date: 9/2/2022  Impression: New moderately decreased rate of gastric emptying

## 2022-10-06 ENCOUNTER — OFFICE VISIT (OUTPATIENT)
Dept: ENDOCRINOLOGY | Facility: HOSPITAL | Age: 52
End: 2022-10-06
Payer: COMMERCIAL

## 2022-10-06 VITALS
BODY MASS INDEX: 32.34 KG/M2 | DIASTOLIC BLOOD PRESSURE: 78 MMHG | WEIGHT: 231 LBS | HEART RATE: 109 BPM | HEIGHT: 71 IN | SYSTOLIC BLOOD PRESSURE: 120 MMHG

## 2022-10-06 DIAGNOSIS — I10 ESSENTIAL HYPERTENSION: ICD-10-CM

## 2022-10-06 DIAGNOSIS — E10.42 TYPE 1 DIABETES MELLITUS WITH DIABETIC POLYNEUROPATHY (HCC): Primary | ICD-10-CM

## 2022-10-06 DIAGNOSIS — K31.84 DIABETIC GASTROPARESIS (HCC): ICD-10-CM

## 2022-10-06 DIAGNOSIS — E03.8 CENTRAL HYPOTHYROIDISM: ICD-10-CM

## 2022-10-06 DIAGNOSIS — G62.9 PERIPHERAL POLYNEUROPATHY: ICD-10-CM

## 2022-10-06 DIAGNOSIS — Z96.41 INSULIN PUMP STATUS: ICD-10-CM

## 2022-10-06 DIAGNOSIS — E78.5 HYPERLIPIDEMIA, UNSPECIFIED HYPERLIPIDEMIA TYPE: ICD-10-CM

## 2022-10-06 DIAGNOSIS — E11.43 DIABETIC GASTROPARESIS (HCC): ICD-10-CM

## 2022-10-06 DIAGNOSIS — E10.319 DIABETIC RETINOPATHY ASSOCIATED WITH TYPE 1 DIABETES MELLITUS, MACULAR EDEMA PRESENCE UNSPECIFIED, UNSPECIFIED LATERALITY, UNSPECIFIED RETINOPATHY SEVERITY (HCC): ICD-10-CM

## 2022-10-06 LAB
ALBUMIN SERPL-MCNC: 4.7 G/DL (ref 4–5)
ALBUMIN/GLOB SERPL: 1.6 {RATIO} (ref 1.2–2.2)
ALP SERPL-CCNC: 73 IU/L (ref 44–121)
ALT SERPL-CCNC: 63 IU/L (ref 0–44)
AST SERPL-CCNC: 58 IU/L (ref 0–40)
BASOPHILS # BLD AUTO: 0.1 X10E3/UL (ref 0–0.2)
BASOPHILS NFR BLD AUTO: 1 %
BILIRUB SERPL-MCNC: 0.2 MG/DL (ref 0–1.2)
BUN SERPL-MCNC: 10 MG/DL (ref 6–24)
BUN/CREAT SERPL: 11 (ref 9–20)
CALCIUM SERPL-MCNC: 9.6 MG/DL (ref 8.7–10.2)
CHLORIDE SERPL-SCNC: 98 MMOL/L (ref 96–106)
CO2 SERPL-SCNC: 28 MMOL/L (ref 20–29)
CREAT SERPL-MCNC: 0.91 MG/DL (ref 0.76–1.27)
EGFR: 103 ML/MIN/1.73
EOSINOPHIL # BLD AUTO: 0.1 X10E3/UL (ref 0–0.4)
EOSINOPHIL NFR BLD AUTO: 1 %
ERYTHROCYTE [DISTWIDTH] IN BLOOD BY AUTOMATED COUNT: 12.5 % (ref 11.6–15.4)
EST. AVERAGE GLUCOSE BLD GHB EST-MCNC: 180 MG/DL
GLOBULIN SER-MCNC: 3 G/DL (ref 1.5–4.5)
GLUCOSE SERPL-MCNC: 102 MG/DL (ref 70–99)
HBA1C MFR BLD: 7.9 % (ref 4.8–5.6)
HCT VFR BLD AUTO: 41.6 % (ref 37.5–51)
HGB BLD-MCNC: 14.4 G/DL (ref 13–17.7)
IMM GRANULOCYTES # BLD: 0.2 X10E3/UL (ref 0–0.1)
IMM GRANULOCYTES NFR BLD: 2 %
LYMPHOCYTES # BLD AUTO: 2.8 X10E3/UL (ref 0.7–3.1)
LYMPHOCYTES NFR BLD AUTO: 23 %
MCH RBC QN AUTO: 32.4 PG (ref 26.6–33)
MCHC RBC AUTO-ENTMCNC: 34.6 G/DL (ref 31.5–35.7)
MCV RBC AUTO: 94 FL (ref 79–97)
MONOCYTES # BLD AUTO: 0.9 X10E3/UL (ref 0.1–0.9)
MONOCYTES NFR BLD AUTO: 7 %
NEUTROPHILS # BLD AUTO: 8 X10E3/UL (ref 1.4–7)
NEUTROPHILS NFR BLD AUTO: 66 %
PLATELET # BLD AUTO: 351 X10E3/UL (ref 150–450)
POTASSIUM SERPL-SCNC: 4.2 MMOL/L (ref 3.5–5.2)
PROT SERPL-MCNC: 7.7 G/DL (ref 6–8.5)
RBC # BLD AUTO: 4.45 X10E6/UL (ref 4.14–5.8)
SODIUM SERPL-SCNC: 142 MMOL/L (ref 134–144)
TSH SERPL DL<=0.005 MIU/L-ACNC: 0.75 UIU/ML (ref 0.45–4.5)
WBC # BLD AUTO: 12.1 X10E3/UL (ref 3.4–10.8)

## 2022-10-06 PROCEDURE — 95251 CONT GLUC MNTR ANALYSIS I&R: CPT | Performed by: NURSE PRACTITIONER

## 2022-10-06 PROCEDURE — 99214 OFFICE O/P EST MOD 30 MIN: CPT | Performed by: NURSE PRACTITIONER

## 2022-10-06 NOTE — PROGRESS NOTES
Jason Sanchez 50 y o  male MRN: [id number]    Encounter: [id number]      Assessment/Plan     Assessment: This is a 50y o -year-old male with type 1 diabetes with retinopathy on insulin pump therapy and hyperlipidemia  Plan:  1  Type 1 diabetes with insulin pump status:  His hemoglobin A1c is 7 9   However, his pump and sensor download appears to be improved with some mild hyperglycemia in the afternoon and evening   For this I have adjusted his I:C ratio at noon to 1 5 and 1 5 at 6 pm   I have asked him to continue a proper diabetic diet and perform a download through [employer] in 2 weeks for review  We will contact him with any changes based upon the data from his upcoming report  Check hemoglobin A1c prior to next visit      2  Peripheral neuropathy:  He continues with intermittent bilateral foot pain  Foot exam performed in the office today reveals diminished sensation   Continue gabapentin    He is continuing to follow-up with vascular   Follow up with Podiatry for regular diabetic foot care       3  Hyperlipidemia:  Continue current regimen  Check fasting lipid panel prior to next visit      4   Hypertension:  He is normotensive in the office today   Continue Losartan  Check comprehensive metabolic panel prior to next visit  CC: Type 1 Diabetes follow up    History of Present Illness     HPI:  50y o  year old male with type 1 diabetes for over 20 years   He is on insulin at home and takes Novolog via Medtronic 670 g insulin pump   Most recent hemoglobin A1c from October 5, 2022 is 7 9  He denies any polyuria, polydipsia, nocturia and blurry vision  He denies nephropathy but does admit to neuropathy and retinopathy   His Medtronic 670 G insulin pump settings are as follows:          Current settings:  Basal rate  ? 0000 - 1 60 units/hr  ? 0800 - 2 20 units/hr  ? 1200 - 1 90 units/hr  ? 2200 - 1 60 units/hr  Carb ratios  ?  0:00 - 3 0  ? 12:00 - 2 0  ? 18:00 - 1 8  Insulin sensitivity 30  BG target 130       Hypoglycemic episodes: Rare, utilizes CGM        Follows with Dr Yarelis Garcia for eye care regularly  [facility] December 2020  The patient's last foot exam was performed at last office visit on November 11, 2021      Blood Sugar/Glucometer/Pump/CGM review:  Insulin pump download from September 20 through October 5, 2022  reveals an average sensor glucose of 170 with a standard deviation of 46  Auto mode 91 % of the time   Total daily dose is 101 1 units  58 5 % is basal with 42 6 % bolus      For hypertension, he is treated with losartan 50 mg daily       For hyperlipidemia continues on simvastatin 40 mg daily       Review of Systems   Constitutional: Positive for fatigue  Negative for chills and fever  HENT: Negative  Negative for trouble swallowing and voice change  Eyes: Negative for photophobia, pain, discharge, redness, itching and visual disturbance  Respiratory: Negative for cough and shortness of breath  Cardiovascular: Negative for chest pain and palpitations  Gastrointestinal: Positive for abdominal pain (at times) and nausea  Negative for constipation, diarrhea and vomiting  Endocrine: Positive for polyuria (1-2 times per night nocturia)  Negative for cold intolerance, heat intolerance, polydipsia and polyphagia  Genitourinary: Negative  Musculoskeletal: Negative  Skin: Negative  Allergic/Immunologic: Negative  Neurological: Positive for numbness  Negative for dizziness, syncope, light-headedness and headaches  Hematological: Negative  Psychiatric/Behavioral: Negative  All other systems reviewed and are negative        Historical Information   Past Medical History:   Diagnosis Date    CPAP (continuous positive airway pressure) dependence     [facility] mellitus [facility] [address] [facility]     Diabetic retinopathy [facility] [address] [facility]     Hyperlipidemia     Hypertension     Neuropathy     feet    Sleep apnea      Past Surgical History:   Procedure Laterality Date    UPPER GASTROINTESTINAL ENDOSCOPY      VASECTOMY       Social History   Social History     Substance and Sexual Activity   Alcohol Use Yes    Alcohol/week: 14 0 standard drinks    Types: 10 Glasses of wine, 4 Cans of beer per week    Comment: 1 drink daily     Social History     Substance and Sexual Activity   Drug Use No     Social History     Tobacco Use   Smoking Status Former Smoker    Packs/day: 0 50    Years: 30 00    Pack years: 15 00    Types: Cigarettes    Quit date: 2012    Years since quittin 8   Smokeless Tobacco Never Used     Family History:   Family History   Problem Relation Age of Onset    No Known Problems Mother     Hyperlipidemia Father     Hypertension Father     Irritable bowel syndrome Father     No Known Problems Brother        Meds/Allergies   Current Outpatient Medications   Medication Sig Dispense Refill    Accu-Chek Guide test strip USE TO TEST 5 TIMES DAILY 500 strip 5    buPROPion (WELLBUTRIN XL) 300 mg 24 hr tablet Take 300 mg by mouth daily  3    DYMISTA 137-50 MCG/ACT SUSP SPRAY 1 SPRAY IN EACH NOSTRIL BY INTRANASAL ROUTE 2 TIMES PER DAY (Patient not taking: Reported on 2022)  5    fexofenadine (ALLEGRA) 180 MG tablet Take 180 mg by mouth daily      gabapentin (NEURONTIN) 300 mg capsule TAKE 2 CAPSULES BY MOUTH 2 TIMES A DAY  360 capsule 1    losartan (COZAAR) 50 mg tablet TAKE 1 TABLET BY MOUTH EVERY DAY 90 tablet 3    metoclopramide (REGLAN) 5 mg tablet Take 1 tablet (5 mg total) by mouth daily at bedtime 30 tablet 5    NovoLOG 100 UNIT/ML injection INJECT UP  UNITS DAILY VIA INSULIN PUMP 90 mL 3    simvastatin (ZOCOR) 40 mg tablet TAKE 1 TABLET (40 MG TOTAL) BY MOUTH DAILY AT BEDTIME 90 tablet 3     No current facility-administered medications for this visit       Allergies   Allergen Reactions    Grass Extracts [Gramineae Pollens] Other (See Comments)     Post nasal drip  cough    Tree Extract Other (See Comments)     Post nasal drip  cough Objective   Vitals: There were no vitals taken for this visit  Physical Exam  Vitals reviewed  Constitutional:       Appearance: He is well-developed  HENT:      Head: Normocephalic and atraumatic  Nose: Nose normal    Eyes:      Conjunctiva/sclera: Conjunctivae normal       Pupils: Pupils are equal, round, and reactive to light  Cardiovascular:      Rate and Rhythm: Normal rate and regular rhythm  Pulses: no weak pulses          Dorsalis pedis pulses are 1+ on the right side and 1+ on the left side  Posterior tibial pulses are 1+ on the right side and 1+ on the left side  Heart sounds: Normal heart sounds  Pulmonary:      Effort: Pulmonary effort is normal       Breath sounds: Normal breath sounds  Abdominal:      General: Bowel sounds are normal       Palpations: Abdomen is soft  Musculoskeletal:         General: Normal range of motion  Cervical back: Normal range of motion and neck supple  Feet:      Right foot:      Skin integrity: No ulcer, skin breakdown, erythema, warmth, callus or dry skin  Left foot:      Skin integrity: No ulcer, skin breakdown, erythema, warmth, callus or dry skin  Skin:     General: Skin is warm and dry  Neurological:      Mental Status: He is alert and oriented to person, place, and time  Psychiatric:         Behavior: Behavior normal          Thought Content: Thought content normal          Judgment: Judgment normal          Patient's shoes and socks removed  Right Foot/Ankle   Right Foot Inspection  Skin Exam: skin normal and skin intact  No dry skin, no warmth, no callus, no erythema, no maceration, no abnormal color, no pre-ulcer, no ulcer and no callus  Toe Exam: ROM and strength within normal limits  Sensory   Monofilament testing: diminished    Vascular  Capillary refills: < 3 seconds  The right DP pulse is 1+  The right PT pulse is 1+       Left Foot/Ankle  Left Foot Inspection  Skin Exam: skin normal and skin intact  No dry skin, no warmth, no erythema, no maceration, normal color, no pre-ulcer, no ulcer and no callus  Toe Exam: ROM and strength within normal limits  Sensory   Monofilament testing: diminished    Vascular  Capillary refills: < 3 seconds  The left DP pulse is 1+  The left PT pulse is 1+       Assign Risk Category  No deformity present  Loss of protective sensation  No weak pulses  Risk: 1    Lab Results:   Lab Results   Component Value Date/Time    Hemoglobin A1C 7 9 (H) 10/05/2022 12:29 PM    Hemoglobin A1C 7 2 (H) 06/24/2022 08:53 AM    Hemoglobin A1C 7 6 (H) 02/11/2022 09:49 AM    White Blood Cell Count 12 1 (H) 10/05/2022 12:29 PM    White Blood Cell Count 7 9 06/24/2022 08:53 AM    White Blood Cell Count 7 6 02/11/2022 09:49 AM    Hemoglobin 14 4 10/05/2022 12:29 PM    Hemoglobin 12 8 (L) 06/24/2022 08:53 AM    Hemoglobin 14 4 02/11/2022 09:49 AM    HCT 41 6 10/05/2022 12:29 PM    HCT 38 1 06/24/2022 08:53 AM    HCT 40 9 02/11/2022 09:49 AM    MCV 94 10/05/2022 12:29 PM     (H) 06/24/2022 08:53 AM    MCV 96 02/11/2022 09:49 AM    Platelet Count 051 55/37/5287 12:29 PM    Platelet Count 202 02/05/8019 08:53 AM    Platelet Count 811 57/85/2114 09:49 AM    BUN 10 10/05/2022 12:29 PM    BUN 9 06/24/2022 08:53 AM    BUN 8 02/11/2022 09:49 AM    Potassium 4 2 10/05/2022 12:29 PM    Potassium 4 7 06/24/2022 08:53 AM    Potassium 5 2 02/11/2022 09:49 AM    Chloride 98 10/05/2022 12:29 PM    Chloride 101 06/24/2022 08:53 AM    Chloride 95 (L) 02/11/2022 09:49 AM    CO2 28 10/05/2022 12:29 PM    CO2 26 06/24/2022 08:53 AM    CO2 22 02/11/2022 09:49 AM    Creatinine 0 91 10/05/2022 12:29 PM    Creatinine 0 90 06/24/2022 08:53 AM    Creatinine 0 92 02/11/2022 09:49 AM    AST 58 (H) 10/05/2022 12:29 PM    AST 46 (H) 06/24/2022 08:53 AM    AST 48 (H) 02/11/2022 09:49 AM    ALT 63 (H) 10/05/2022 12:29 PM    ALT 42 06/24/2022 08:53 AM    ALT 37 02/11/2022 09:49 AM    Albumin 4 7 10/05/2022 12:29 PM Albumin 4 1 06/24/2022 08:53 AM    Albumin 4 3 02/11/2022 09:49 AM    Globulin, Total 3 0 10/05/2022 12:29 PM    Globulin, Total 2 8 06/24/2022 08:53 AM    Globulin, Total 2 8 02/11/2022 09:49 AM    HDL 72 06/24/2022 08:53 AM    HDL 94 11/01/2021 10:29 AM    Triglycerides 76 06/24/2022 08:53 AM    Triglycerides 58 11/01/2021 10:29 AM     Portions of the record may have been created with voice recognition software  Occasional wrong word or "sound a like" substitutions may have occurred due to the inherent limitations of voice recognition software  Read the chart carefully and recognize, using context, where substitutions have occurred

## 2022-10-06 NOTE — PATIENT INSTRUCTIONS
Be mindful of diet  Stay active and stay hydrated  We made slight changes to your pump settings to help with slightly high blood sugars in the afternoon and evening  Please perform a download of her pump/sensor via [employer] in the next few weeks for review  Continue Losartan  Contact the office with any issues  Continue simvastatin  Obtain lab work as prescribed  Follow up with podiatry       Obtain a diabetic eye exam

## 2022-10-15 DIAGNOSIS — E78.5 HYPERLIPIDEMIA, UNSPECIFIED HYPERLIPIDEMIA TYPE: ICD-10-CM

## 2022-10-17 RX ORDER — SIMVASTATIN 40 MG
40 TABLET ORAL
Qty: 90 TABLET | Refills: 3 | Status: SHIPPED | OUTPATIENT
Start: 2022-10-17

## 2022-11-07 ENCOUNTER — TELEPHONE (OUTPATIENT)
Dept: ENDOCRINOLOGY | Facility: HOSPITAL | Age: 52
End: 2022-11-07

## 2022-11-11 NOTE — TELEPHONE ENCOUNTER
Reviewed Aditya's Medtronic pump and sensor download  Overall he is relatively well controlled throughout the day with some episodic hyperglycemia in the afternoon evening at times but not consistently  For now, I would suggest that he continue his current regimen

## 2023-01-07 LAB
ALBUMIN SERPL-MCNC: 4.4 G/DL (ref 4–5)
ALBUMIN/GLOB SERPL: 1.9 {RATIO} (ref 1.2–2.2)
ALP SERPL-CCNC: 94 IU/L (ref 44–121)
ALT SERPL-CCNC: 25 IU/L (ref 0–44)
AST SERPL-CCNC: 25 IU/L (ref 0–40)
BASOPHILS # BLD AUTO: 0.1 X10E3/UL (ref 0–0.2)
BASOPHILS NFR BLD AUTO: 1 %
BILIRUB SERPL-MCNC: 0.3 MG/DL (ref 0–1.2)
BUN SERPL-MCNC: 9 MG/DL (ref 6–24)
BUN/CREAT SERPL: 10 (ref 9–20)
CALCIUM SERPL-MCNC: 9.5 MG/DL (ref 8.7–10.2)
CHLORIDE SERPL-SCNC: 100 MMOL/L (ref 96–106)
CHOLEST SERPL-MCNC: 170 MG/DL (ref 100–199)
CHOLEST/HDLC SERPL: 2.8 RATIO (ref 0–5)
CO2 SERPL-SCNC: 24 MMOL/L (ref 20–29)
CREAT SERPL-MCNC: 0.93 MG/DL (ref 0.76–1.27)
EGFR: 100 ML/MIN/1.73
EOSINOPHIL # BLD AUTO: 0.5 X10E3/UL (ref 0–0.4)
EOSINOPHIL NFR BLD AUTO: 4 %
ERYTHROCYTE [DISTWIDTH] IN BLOOD BY AUTOMATED COUNT: 13 % (ref 11.6–15.4)
EST. AVERAGE GLUCOSE BLD GHB EST-MCNC: 177 MG/DL
GLOBULIN SER-MCNC: 2.3 G/DL (ref 1.5–4.5)
GLUCOSE SERPL-MCNC: 148 MG/DL (ref 70–99)
HBA1C MFR BLD: 7.8 % (ref 4.8–5.6)
HCT VFR BLD AUTO: 41.5 % (ref 37.5–51)
HDLC SERPL-MCNC: 60 MG/DL
HGB BLD-MCNC: 14 G/DL (ref 13–17.7)
IMM GRANULOCYTES # BLD: 0.1 X10E3/UL (ref 0–0.1)
IMM GRANULOCYTES NFR BLD: 1 %
LDLC SERPL CALC-MCNC: 94 MG/DL (ref 0–99)
LYMPHOCYTES # BLD AUTO: 3.5 X10E3/UL (ref 0.7–3.1)
LYMPHOCYTES NFR BLD AUTO: 28 %
MCH RBC QN AUTO: 32.3 PG (ref 26.6–33)
MCHC RBC AUTO-ENTMCNC: 33.7 G/DL (ref 31.5–35.7)
MCV RBC AUTO: 96 FL (ref 79–97)
MONOCYTES # BLD AUTO: 0.8 X10E3/UL (ref 0.1–0.9)
MONOCYTES NFR BLD AUTO: 6 %
NEUTROPHILS # BLD AUTO: 7.4 X10E3/UL (ref 1.4–7)
NEUTROPHILS NFR BLD AUTO: 60 %
PLATELET # BLD AUTO: 370 X10E3/UL (ref 150–450)
POTASSIUM SERPL-SCNC: 4.7 MMOL/L (ref 3.5–5.2)
PROT SERPL-MCNC: 6.7 G/DL (ref 6–8.5)
RBC # BLD AUTO: 4.33 X10E6/UL (ref 4.14–5.8)
SL AMB VLDL CHOLESTEROL CALC: 16 MG/DL (ref 5–40)
SODIUM SERPL-SCNC: 140 MMOL/L (ref 134–144)
TRIGL SERPL-MCNC: 85 MG/DL (ref 0–149)
TSH SERPL DL<=0.005 MIU/L-ACNC: 1.59 UIU/ML (ref 0.45–4.5)
WBC # BLD AUTO: 12.4 X10E3/UL (ref 3.4–10.8)

## 2023-01-11 ENCOUNTER — OFFICE VISIT (OUTPATIENT)
Dept: ENDOCRINOLOGY | Facility: HOSPITAL | Age: 53
End: 2023-01-11

## 2023-01-11 VITALS
HEIGHT: 71 IN | DIASTOLIC BLOOD PRESSURE: 78 MMHG | BODY MASS INDEX: 31.64 KG/M2 | HEART RATE: 109 BPM | WEIGHT: 226 LBS | SYSTOLIC BLOOD PRESSURE: 130 MMHG

## 2023-01-11 DIAGNOSIS — E11.43 DIABETIC GASTROPARESIS (HCC): ICD-10-CM

## 2023-01-11 DIAGNOSIS — G62.9 PERIPHERAL POLYNEUROPATHY: ICD-10-CM

## 2023-01-11 DIAGNOSIS — E10.319 DIABETIC RETINOPATHY ASSOCIATED WITH TYPE 1 DIABETES MELLITUS, MACULAR EDEMA PRESENCE UNSPECIFIED, UNSPECIFIED LATERALITY, UNSPECIFIED RETINOPATHY SEVERITY (HCC): ICD-10-CM

## 2023-01-11 DIAGNOSIS — E78.49 OTHER HYPERLIPIDEMIA: ICD-10-CM

## 2023-01-11 DIAGNOSIS — I10 ESSENTIAL HYPERTENSION: ICD-10-CM

## 2023-01-11 DIAGNOSIS — K31.84 DIABETIC GASTROPARESIS (HCC): ICD-10-CM

## 2023-01-11 DIAGNOSIS — E03.8 CENTRAL HYPOTHYROIDISM: ICD-10-CM

## 2023-01-11 DIAGNOSIS — Z96.41 INSULIN PUMP STATUS: ICD-10-CM

## 2023-01-11 DIAGNOSIS — E10.42 TYPE 1 DIABETES MELLITUS WITH DIABETIC POLYNEUROPATHY (HCC): Primary | ICD-10-CM

## 2023-01-11 RX ORDER — BUDESONIDE AND FORMOTEROL FUMARATE DIHYDRATE 160; 4.5 UG/1; UG/1
2 AEROSOL RESPIRATORY (INHALATION) 2 TIMES DAILY
COMMUNITY
Start: 2023-01-01

## 2023-01-11 RX ORDER — ALBUTEROL SULFATE 90 UG/1
AEROSOL, METERED RESPIRATORY (INHALATION)
COMMUNITY
Start: 2022-11-04

## 2023-01-11 RX ORDER — FLUTICASONE PROPIONATE 50 MCG
SPRAY, SUSPENSION (ML) NASAL
COMMUNITY
Start: 2022-11-05

## 2023-01-11 NOTE — PATIENT INSTRUCTIONS
Be mindful of diet  Stay active and stay hydrated  We made slight changes to your pump settings to help with slightly high blood sugars after meals  Please perform a download of her pump/sensor via [employer] in the next few weeks for review  Continue Losartan  Continue simvastatin  Obtain lab work as prescribed  Follow up with podiatry       Obtain a diabetic eye exam

## 2023-01-11 NOTE — PROGRESS NOTES
Amber Walters 50 y o  male MRN: [id number]    Encounter: [id number]      Assessment/Plan     Assessment: This is a 50y o -year-old male with type 1 diabetes with retinopathy on insulin pump therapy and hyperlipidemia  Plan:  1  Type 1 diabetes with insulin pump status:  His hemoglobin A1c is 7 8   However, his pump and sensor download appears to be improved with some mild hyperglycemia after meals   For this I have adjusted his I:C ratio at midnight to 2 2, noon to 1 8 and 1 8 at 6 pm   I have asked him to continue a proper diabetic diet and perform a download through [employer] in 2 weeks for review  We will contact him with any changes based upon the data from his upcoming report  Check hemoglobin A1c prior to next visit      2  Peripheral neuropathy:  He continues with intermittent bilateral foot pain  Foot exam performed in the office today reveals diminished sensation   Continue gabapentin    He is continuing to follow-up with vascular   Follow up with Podiatry for regular diabetic foot care      3   Hypertension:  He is normotensive in the office today   Continue Losartan  Check comprehensive metabolic panel prior to next visit     4  Hyperlipidemia:  Stable  Continue current regimen  CC: Type 1 Diabetes Follow Up    History of Present Illness     HPI:  50y o  year old male with type 1 diabetes for over 20 years   He is on insulin at home and takes Novolog via Medtronic 670 g insulin pump   Most recent hemoglobin A1c from January 6, 2023 is 7 8  He denies any polyuria, polydipsia, nocturia and blurry vision  He denies nephropathy but does admit to neuropathy and retinopathy   His Medtronic 670 G insulin pump settings are as follows:          Current settings:  Basal rate  ? 0000 - 1 60 units/hr  ? 0800 - 2 20 units/hr  ? 1200 - 1 90 units/hr  ? 2200 - 1 60 units/hr  Carb ratios  ?  0:00 - 3 0  ? 12:00 - 2 0  ? 18:00 - 1 8  Insulin sensitivity 30  BG target 130       Hypoglycemic episodes: Rare, utilizes CGM        Follows with Dr Mati Farah for eye care regularly  [facility] visit was in December 2020  The patient's last foot exam was performed at last office visit on October 6, 2022      Blood Sugar/Glucometer/Pump/CGM review:  Insulin pump download from December 29, 2022 through January 11, 2023 reveals an average sensor glucose of 166 with a standard deviation of 43  Auto mode 92% of the time   Total daily dose is 82 1 units  60% is basal with 40 % bolus      For hypertension, he is treated with losartan 50 mg daily       For hyperlipidemia continues on simvastatin 40 mg daily         Review of Systems   Constitutional: Positive for fatigue  Negative for chills and fever  HENT: Negative  Negative for trouble swallowing and voice change  Eyes: Negative for photophobia, pain, discharge, redness, itching and visual disturbance  Respiratory: Negative for cough and shortness of breath  Cardiovascular: Negative for chest pain and palpitations  Gastrointestinal: Negative for abdominal pain, constipation, diarrhea, nausea and vomiting  Endocrine: Positive for polyuria (1-2 times per night nocturia)  Negative for cold intolerance, heat intolerance, polydipsia and polyphagia  Genitourinary: Negative  Musculoskeletal: Negative  Skin: Negative  Allergic/Immunologic: Negative  Neurological: Negative for dizziness, syncope, light-headedness and headaches  Hematological: Negative  Psychiatric/Behavioral: Negative  All other systems reviewed and are negative        Historical Information   Past Medical History:   Diagnosis Date   • CPAP (continuous positive airway pressure) dependence    • Diabetes mellitus ([facility] [address] )    • Diabetic retinopathy ([facility] [address] )    • Hyperlipidemia    • Hypertension    • Neuropathy     feet   • Sleep apnea      Past Surgical History:   Procedure Laterality Date   • UPPER GASTROINTESTINAL ENDOSCOPY     • VASECTOMY       Social History   Social History     Substance and Sexual Activity   Alcohol Use Yes   • Alcohol/week: 14 0 standard drinks   • Types: 10 Glasses of wine, 4 Cans of beer per week    Comment: 1 drink daily     Social History     Substance and Sexual Activity   Drug Use No     Social History     Tobacco Use   Smoking Status Former   • Packs/day: 0 50   • Years: 30 00   • Pack years: 15 00   • Types: Cigarettes   • Quit date: 12/14/2012   • Years since quitting: 10 0   Smokeless Tobacco Never     Family History:   Family History   Problem Relation Age of Onset   • No Known Problems Mother    • Hyperlipidemia Father    • Hypertension Father    • Irritable bowel syndrome Father    • No Known Problems Brother        Meds/Allergies   Current Outpatient Medications   Medication Sig Dispense Refill   • Accu-Chek Guide test strip USE TO TEST 5 TIMES DAILY 500 strip 5   • albuterol (PROVENTIL HFA,VENTOLIN HFA) 90 mcg/act inhaler TAKE 2 PUFFS BY INHALATION ROUTE EVERY 4 HOURS AS NEEDED AND 20MINUTES PRIOR TO EXERCISE AS NEEDED     • buPROPion (WELLBUTRIN XL) 300 mg 24 hr tablet Take 300 mg by mouth daily  3   • DYMISTA 137-50 MCG/ACT SUSP SPRAY 1 SPRAY IN EACH NOSTRIL BY INTRANASAL ROUTE 2 TIMES PER DAY  5   • fexofenadine (ALLEGRA) 180 MG tablet Take 180 mg by mouth daily     • fluticasone (FLONASE) 50 mcg/act nasal spray SPRAY 2 PUFFS NASALLY EVERY DAY     • gabapentin (NEURONTIN) 300 mg capsule TAKE 2 CAPSULES BY MOUTH 2 TIMES A DAY  360 capsule 1   • losartan (COZAAR) 50 mg tablet TAKE 1 TABLET BY MOUTH EVERY DAY 90 tablet 3   • metoclopramide (REGLAN) 5 mg tablet Take 1 tablet (5 mg total) by mouth daily at bedtime 30 tablet 5   • NovoLOG 100 UNIT/ML injection INJECT UP  UNITS DAILY VIA INSULIN PUMP 90 mL 3   • simvastatin (ZOCOR) 40 mg tablet TAKE 1 TABLET (40 MG TOTAL) BY MOUTH DAILY AT BEDTIME 90 tablet 3   • Symbicort 160-4 5 MCG/ACT inhaler Inhale 2 puffs 2 (two) times a day       No current facility-administered medications for this visit       Allergies   Allergen Reactions   • Grass Extracts [Gramineae Pollens] Other (See Comments)     Post nasal drip  cough   • Tree Extract Other (See Comments)     Post nasal drip  cough       Objective   Vitals: Blood pressure 130/78, pulse (!) 109, height 5' 11" (1 803 m), weight 103 kg (226 lb)  Physical Exam  Vitals reviewed  Constitutional:       Appearance: He is well-developed  HENT:      Head: Normocephalic and atraumatic  Nose: Nose normal    Eyes:      Conjunctiva/sclera: Conjunctivae normal       Pupils: Pupils are equal, round, and reactive to light  Cardiovascular:      Rate and Rhythm: Normal rate and regular rhythm  Heart sounds: Normal heart sounds  Pulmonary:      Effort: Pulmonary effort is normal       Breath sounds: Normal breath sounds  Abdominal:      General: Bowel sounds are normal       Palpations: Abdomen is soft  Musculoskeletal:         General: Normal range of motion  Cervical back: Normal range of motion and neck supple  Skin:     General: Skin is warm and dry  Neurological:      Mental Status: He is alert and oriented to person, place, and time  Psychiatric:         Behavior: Behavior normal          Thought Content:  Thought content normal          Judgment: Judgment normal        Lab Results:   Lab Results   Component Value Date/Time    Hemoglobin A1C 7 8 (H) 01/06/2023 07:44 AM    Hemoglobin A1C 7 9 (H) 10/05/2022 12:29 PM    Hemoglobin A1C 7 2 (H) 06/24/2022 08:53 AM    White Blood Cell Count 12 4 (H) 01/06/2023 07:44 AM    White Blood Cell Count 12 1 (H) 10/05/2022 12:29 PM    White Blood Cell Count 7 9 06/24/2022 08:53 AM    Hemoglobin 14 0 01/06/2023 07:44 AM    Hemoglobin 14 4 10/05/2022 12:29 PM    Hemoglobin 12 8 (L) 06/24/2022 08:53 AM    HCT 41 5 01/06/2023 07:44 AM    HCT 41 6 10/05/2022 12:29 PM    HCT 38 1 06/24/2022 08:53 AM    MCV 96 01/06/2023 07:44 AM    MCV 94 10/05/2022 12:29 PM     (H) 06/24/2022 08:53 AM    Platelet Count 264 84/90/4932 07:44 AM Platelet Count 821 25/26/9349 12:29 PM    Platelet Count 767 42/81/7470 08:53 AM    BUN 9 01/06/2023 07:44 AM    BUN 10 10/05/2022 12:29 PM    BUN 9 06/24/2022 08:53 AM    Potassium 4 7 01/06/2023 07:44 AM    Potassium 4 2 10/05/2022 12:29 PM    Potassium 4 7 06/24/2022 08:53 AM    Chloride 100 01/06/2023 07:44 AM    Chloride 98 10/05/2022 12:29 PM    Chloride 101 06/24/2022 08:53 AM    CO2 24 01/06/2023 07:44 AM    CO2 28 10/05/2022 12:29 PM    CO2 26 06/24/2022 08:53 AM    Creatinine 0 93 01/06/2023 07:44 AM    Creatinine 0 91 10/05/2022 12:29 PM    Creatinine 0 90 06/24/2022 08:53 AM    AST 25 01/06/2023 07:44 AM    AST 58 (H) 10/05/2022 12:29 PM    AST 46 (H) 06/24/2022 08:53 AM    ALT 25 01/06/2023 07:44 AM    ALT 63 (H) 10/05/2022 12:29 PM    ALT 42 06/24/2022 08:53 AM    Albumin 4 4 01/06/2023 07:44 AM    Albumin 4 7 10/05/2022 12:29 PM    Albumin 4 1 06/24/2022 08:53 AM    Globulin, Total 2 3 01/06/2023 07:44 AM    Globulin, Total 3 0 10/05/2022 12:29 PM    Globulin, Total 2 8 06/24/2022 08:53 AM    HDL 60 01/06/2023 07:44 AM    HDL 72 06/24/2022 08:53 AM    Triglycerides 85 01/06/2023 07:44 AM    Triglycerides 76 06/24/2022 08:53 AM       Portions of the record may have been created with voice recognition software  Occasional wrong word or "sound a like" substitutions may have occurred due to the inherent limitations of voice recognition software  Read the chart carefully and recognize, using context, where substitutions have occurred

## 2023-02-13 ENCOUNTER — TELEPHONE (OUTPATIENT)
Dept: ENDOCRINOLOGY | Facility: HOSPITAL | Age: 53
End: 2023-02-13

## 2023-02-13 NOTE — TELEPHONE ENCOUNTER
The patient sent a message concerning recent pump download  It is being scanned into his chart, please review and advise

## 2023-02-14 DIAGNOSIS — G62.9 PERIPHERAL POLYNEUROPATHY: ICD-10-CM

## 2023-02-14 RX ORDER — GABAPENTIN 300 MG/1
CAPSULE ORAL
Qty: 360 CAPSULE | Refills: 1 | Status: SHIPPED | OUTPATIENT
Start: 2023-02-14

## 2023-02-24 ENCOUNTER — TELEPHONE (OUTPATIENT)
Dept: GASTROENTEROLOGY | Facility: CLINIC | Age: 53
End: 2023-02-24

## 2023-02-24 NOTE — TELEPHONE ENCOUNTER
Spoke to the patient in regards to his symptoms of diarrhea  Patient states he has been taking Keflex since last Monday for an apparent foot injury  [facility]   States he had loose bowel movements yesterday and increased diarrhea today  Encourage patient to try and get yogurt and take for a few days as well as adequate fiber and fluids  Encourage patient to call back to the office if he continues to have or worsen diarrhea on Monday

## 2023-02-24 NOTE — TELEPHONE ENCOUNTER
Patients GI provider:  Dr Violetta Glez    Number to return call: [phone]    Reason for call: Pt calling with symptoms of Diarrhea and vomiting  Please call to discuss      Scheduled procedure/appointment date if applicable: Apt/procedure 5/2/23

## 2023-02-24 NOTE — TELEPHONE ENCOUNTER
I returned call patient experiencing nausea x few days and diarrhea x 2 days  He is on Keflex since Monday for foot injury  No changes in his diet  All meds reviewed in Epic only change is he is not on Dymista  He is afebrile and there is no blood in stool  Confirmed [facility] on file  Please address

## 2023-04-01 DIAGNOSIS — R11.14 BILIOUS VOMITING WITH NAUSEA: ICD-10-CM

## 2023-04-02 RX ORDER — METOCLOPRAMIDE 5 MG/1
TABLET ORAL
Qty: 30 TABLET | Refills: 5 | Status: SHIPPED | OUTPATIENT
Start: 2023-04-02

## 2023-05-02 ENCOUNTER — OFFICE VISIT (OUTPATIENT)
Dept: GASTROENTEROLOGY | Facility: CLINIC | Age: 53
End: 2023-05-02

## 2023-05-02 VITALS
WEIGHT: 225.2 LBS | DIASTOLIC BLOOD PRESSURE: 82 MMHG | HEIGHT: 71 IN | SYSTOLIC BLOOD PRESSURE: 130 MMHG | BODY MASS INDEX: 31.53 KG/M2

## 2023-05-02 DIAGNOSIS — R11.14 BILIOUS VOMITING WITH NAUSEA: Primary | ICD-10-CM

## 2023-05-02 DIAGNOSIS — K31.84 DIABETIC GASTROPARESIS (HCC): ICD-10-CM

## 2023-05-02 DIAGNOSIS — E11.43 DIABETIC GASTROPARESIS (HCC): ICD-10-CM

## 2023-05-02 RX ORDER — METOCLOPRAMIDE 5 MG/1
5 TABLET ORAL 2 TIMES DAILY
Qty: 60 TABLET | Refills: 12 | Status: SHIPPED | OUTPATIENT
Start: 2023-05-02

## 2023-05-02 RX ORDER — AZELASTINE HYDROCHLORIDE 137 UG/1
SPRAY, METERED NASAL
COMMUNITY
Start: 2023-04-22

## 2023-05-02 NOTE — PATIENT INSTRUCTIONS
Increase Reglan to twice a day  Okay to take extra dose on days where you have a lot of nausea  Continue to stick to the diabetic low residue diet  Continue to work with endocrinology to get your A1c down    Follow-up office visit 6 months

## 2023-05-02 NOTE — LETTER
May 2, 2023     [address]  [address] [address]    Patient: Gray Paulino   YOB: 1970   Date of Visit: 5/2/2023       Dear Dr Conrad Glavan: Thank you for referring Gray Paulino to me for evaluation  Below are my notes for this consultation  If you have questions, please do not hesitate to call me  I look forward to following your patient along with you  Sincerely,        Hiwot Linder MD        CC: JOAQUINA Zapata MD  5/2/2023  9:02 AM  Sign when Signing Visit  [id number] - Outpatient Follow-up Note  Gray Paulino 48 y o  male MRN: [id number]  Encounter: [id number]    ASSESSMENT AND PLAN:      1  Bilious vomiting with nausea  2  Diabetic gastroparesis ([address] )  Somewhat better with with low-dose Reglan  Seems to be tolerating it without side effects   -Continue gastroparesis diet  - Increase metoclopramide (REGLAN) 5 mg tablet; Take 1 tablet (5 mg total) by mouth 2 (two) times a day  Dispense: 60 tablet; Refill: 12  -Continue to work with endocrinology with glucose management        Followup Appointment: 6 months  ______________________________________________________________________    Chief Complaint   Patient presents with    follow up/ still having nausea and vomiting     HPI: The patient is seen back in the office today  He has a history of diabetic gastroparesis with dyspepsia and nausea  He is currently taking Reglan at bedtime  He is doing well for the most part  Occasionally will have nausea vomiting in the morning  Occasional   He denies any side effects from the Reglan  He denies any heartburn or indigestion  He denies any dysphagia, odynophagia, early satiety  He is moving about regularly  His weight is stable  He continues to work with endocrinology but hemoglobin A1c is still over 8    He is trying to eat several small meals a day and avoid the high-fiber foods    Historical Information   Past "Medical History:   Diagnosis Date    CPAP (continuous positive airway pressure) dependence     [facility]     [facility]     Hyperlipidemia     Hypertension     Neuropathy     feet    Sleep apnea      Past Surgical History:   Procedure Laterality Date    UPPER GASTROINTESTINAL ENDOSCOPY      VASECTOMY       Social History     Substance and Sexual Activity   Alcohol Use Yes    Alcohol/week: 14 0 standard drinks    Types: 10 Glasses of wine, 4 Cans of beer per week    Comment: 1 drink daily     Social History     Substance and Sexual Activity   Drug Use No     Social History     Tobacco Use   Smoking Status Former    Packs/day: 0 50    Years: 30 00    Pack years: 15 00    Types: Cigarettes    Quit date: 12/14/2012    Years since quitting: 10 3   Smokeless Tobacco Never     Family History   Problem Relation Age of Onset    No Known Problems Mother     Hyperlipidemia Father     Hypertension Father     Irritable bowel syndrome Father     No Known Problems Brother          Current Outpatient Medications:     Accu-Chek Guide test strip    albuterol (PROVENTIL HFA,VENTOLIN HFA) 90 mcg/act inhaler    Azelastine HCl 137 MCG/SPRAY SOLN    buPROPion (WELLBUTRIN XL) 300 mg 24 hr tablet    fexofenadine (ALLEGRA) 180 MG tablet    fluticasone (FLONASE) 50 mcg/act nasal spray    gabapentin (NEURONTIN) 300 mg capsule    losartan (COZAAR) 50 mg tablet    metoclopramide (REGLAN) 5 mg tablet    NovoLOG 100 UNIT/ML injection    simvastatin (ZOCOR) 40 mg tablet    SUMAtriptan (IMITREX) 50 mg tablet    Symbicort 160-4 5 MCG/ACT inhaler  Allergies   Allergen Reactions    Grass Extracts [Gramineae Pollens] Other (See Comments)     Post nasal drip  cough    Tree Extract Other (See Comments)     Post nasal drip  cough     Reviewed medications and allergies and updated as indicated    PHYSICAL EXAM:    Blood pressure 130/82, height 5' 11\" (1 803 m), weight 102 kg (225 lb 3 2 oz)   " Body mass index is 31 41 kg/m²  General Appearance: NAD, cooperative, alert  Eyes: Anicteric, PERRLA, EOMI  ENT:  Normocephalic, atraumatic, normal mucosa  Neck:  Supple, symmetrical, trachea midline  Resp:  Clear to auscultation bilaterally; no rales, rhonchi or wheezing; respirations unlabored   CV:  S1 S2, Regular rate and rhythm; no murmur, rub, or gallop  GI:  Soft, non-tender, non-distended; normal bowel sounds; no masses, no organomegaly   Rectal: Deferred  Musculoskeletal: No cyanosis, clubbing or edema  Normal ROM  Skin:  No jaundice, rashes, or lesions   Heme/Lymph: No palpable cervical lymphadenopathy  Psych: Normal affect, good eye contact  Neuro: No gross deficits, AAOx3    Lab Results:   Lab Results   Component Value Date    WBC 10 2 04/15/2023    HGB 14 2 04/15/2023    HCT 42 6 04/15/2023    MCV 98 (H) 04/15/2023     04/15/2023     Lab Results   Component Value Date    K 5 3 (H) 04/15/2023     04/15/2023    CO2 26 04/15/2023    BUN 8 04/15/2023    CREATININE 1 04 04/15/2023    AST 27 04/15/2023    ALT 24 04/15/2023    EGFR 87 04/15/2023       Radiology Results:   No results found

## 2023-05-02 NOTE — PROGRESS NOTES
[id number] - Outpatient Follow-up Note  Allison Peralta 48 y o  male MRN: [id number]  Encounter: [id number]    ASSESSMENT AND PLAN:      1  Bilious vomiting with nausea  2  Diabetic gastroparesis ([address] )  Somewhat better with with low-dose Reglan  Seems to be tolerating it without side effects   -Continue gastroparesis diet  - Increase metoclopramide (REGLAN) 5 mg tablet; Take 1 tablet (5 mg total) by mouth 2 (two) times a day  Dispense: 60 tablet; Refill: 12  -Continue to work with endocrinology with glucose management        Followup Appointment: 6 months  ______________________________________________________________________    Chief Complaint   Patient presents with    follow up/ still having nausea and vomiting     HPI: The patient is seen back in the office today  He has a history of diabetic gastroparesis with dyspepsia and nausea  He is currently taking Reglan at bedtime  He is doing well for the most part  Occasionally will have nausea vomiting in the morning  Occasional   He denies any side effects from the Reglan  He denies any heartburn or indigestion  He denies any dysphagia, odynophagia, early satiety  He is moving about regularly  His weight is stable  He continues to work with endocrinology but hemoglobin A1c is still over 8    He is trying to eat several small meals a day and avoid the high-fiber foods    Historical Information   Past Medical History:   Diagnosis Date    CPAP (continuous positive airway pressure) dependence     Diabetes mellitus ([address] )     Di[facility] retin[facility]     Hyperlipidemia     Hypertension     Neuropathy     feet    Sleep apnea      Past Surgical History:   Procedure Laterality Date    UPPER GASTROINTESTINAL ENDOSCOPY      VASECTOMY       Social History     Substance and Sexual Activity   Alcohol Use Yes    Alcohol/week: 14 0 standard drinks    Types: 10 Glasses of wine, 4 Cans of beer per week    Comment: 1 drink daily "    Social History     Substance and Sexual Activity   Drug Use No     Social History     Tobacco Use   Smoking Status Former    Packs/day: 0 50    Years: 30 00    Pack years: 15 00    Types: Cigarettes    Quit date: 12/14/2012    Years since quitting: 10 3   Smokeless Tobacco Never     Family History   Problem Relation Age of Onset    No Known Problems Mother     Hyperlipidemia Father     Hypertension Father     Irritable bowel syndrome Father     No Known Problems Brother          Current Outpatient Medications:     Accu-Chek Guide test strip    albuterol (PROVENTIL HFA,VENTOLIN HFA) 90 mcg/act inhaler    Azelastine HCl 137 MCG/SPRAY SOLN    buPROPion (WELLBUTRIN XL) 300 mg 24 hr tablet    fexofenadine (ALLEGRA) 180 MG tablet    fluticasone (FLONASE) 50 mcg/act nasal spray    gabapentin (NEURONTIN) 300 mg capsule    losartan (COZAAR) 50 mg tablet    metoclopramide (REGLAN) 5 mg tablet    NovoLOG 100 UNIT/ML injection    simvastatin (ZOCOR) 40 mg tablet    SUMAtriptan (IMITREX) 50 mg tablet    Symbicort 160-4 5 MCG/ACT inhaler  Allergies   Allergen Reactions    Grass Extracts [Gramineae Pollens] Other (See Comments)     Post nasal drip  cough    Tree Extract Other (See Comments)     Post nasal drip  cough     Reviewed medications and allergies and updated as indicated    PHYSICAL EXAM:    Blood pressure 130/82, height 5' 11\" (1 803 m), weight 102 kg (225 lb 3 2 oz)  Body mass index is 31 41 kg/m²  General Appearance: NAD, cooperative, alert  Eyes: Anicteric, PERRLA, EOMI  ENT:  Normocephalic, atraumatic, normal mucosa  Neck:  Supple, symmetrical, trachea midline  Resp:  Clear to auscultation bilaterally; no rales, rhonchi or wheezing; respirations unlabored   CV:  S1 S2, Regular rate and rhythm; no murmur, rub, or gallop  GI:  Soft, non-tender, non-distended; normal bowel sounds; no masses, no organomegaly   Rectal: Deferred  Musculoskeletal: No cyanosis, clubbing or edema   Normal " ROM   Skin:  No jaundice, rashes, or lesions   Heme/Lymph: No palpable cervical lymphadenopathy  Psych: Normal affect, good eye contact  Neuro: No gross deficits, AAOx3    Lab Results:   Lab Results   Component Value Date    WBC 10 2 04/15/2023    HGB 14 2 04/15/2023    HCT 42 6 04/15/2023    MCV 98 (H) 04/15/2023     04/15/2023     Lab Results   Component Value Date    K 5 3 (H) 04/15/2023     04/15/2023    CO2 26 04/15/2023    BUN 8 04/15/2023    CREATININE 1 04 04/15/2023    AST 27 04/15/2023    ALT 24 04/15/2023    EGFR 87 04/15/2023       Radiology Results:   No results found

## 2023-06-05 ENCOUNTER — DOCUMENTATION (OUTPATIENT)
Dept: ENDOCRINOLOGY | Facility: HOSPITAL | Age: 53
End: 2023-06-05

## 2023-07-26 ENCOUNTER — OFFICE VISIT (OUTPATIENT)
Dept: ENDOCRINOLOGY | Facility: HOSPITAL | Age: 53
End: 2023-07-26
Payer: COMMERCIAL

## 2023-07-26 VITALS
DIASTOLIC BLOOD PRESSURE: 82 MMHG | BODY MASS INDEX: 31.36 KG/M2 | HEIGHT: 71 IN | SYSTOLIC BLOOD PRESSURE: 118 MMHG | HEART RATE: 110 BPM | OXYGEN SATURATION: 97 % | WEIGHT: 224 LBS

## 2023-07-26 DIAGNOSIS — E78.49 OTHER HYPERLIPIDEMIA: ICD-10-CM

## 2023-07-26 DIAGNOSIS — Z96.41 INSULIN PUMP STATUS: ICD-10-CM

## 2023-07-26 DIAGNOSIS — E10.42 TYPE 1 DIABETES MELLITUS WITH DIABETIC POLYNEUROPATHY (HCC): Primary | ICD-10-CM

## 2023-07-26 DIAGNOSIS — I10 ESSENTIAL HYPERTENSION: ICD-10-CM

## 2023-07-26 DIAGNOSIS — K31.84 DIABETIC GASTROPARESIS (HCC): ICD-10-CM

## 2023-07-26 DIAGNOSIS — E11.43 DIABETIC GASTROPARESIS (HCC): ICD-10-CM

## 2023-07-26 DIAGNOSIS — E03.8 CENTRAL HYPOTHYROIDISM: ICD-10-CM

## 2023-07-26 DIAGNOSIS — E10.319 DIABETIC RETINOPATHY ASSOCIATED WITH TYPE 1 DIABETES MELLITUS, MACULAR EDEMA PRESENCE UNSPECIFIED, UNSPECIFIED LATERALITY, UNSPECIFIED RETINOPATHY SEVERITY (HCC): ICD-10-CM

## 2023-07-26 DIAGNOSIS — G62.9 PERIPHERAL POLYNEUROPATHY: ICD-10-CM

## 2023-07-26 PROCEDURE — 99214 OFFICE O/P EST MOD 30 MIN: CPT | Performed by: NURSE PRACTITIONER

## 2023-07-26 PROCEDURE — 95251 CONT GLUC MNTR ANALYSIS I&R: CPT | Performed by: NURSE PRACTITIONER

## 2023-07-26 NOTE — PATIENT INSTRUCTIONS
Be mindful of diet. Stay active and stay hydrated. We made slight changes to your pump settings to help with slightly high blood sugars after breakfast and low blood sugar before dinner. Please perform a download of her pump/sensor via [employer] in the next few weeks for review. Continue Losartan. Continue Simvastatin. Obtain lab work as prescribed. Follow up with podiatry.

## 2023-07-26 NOTE — PROGRESS NOTES
Vinayak Blackman 48 y.o. male MRN: [id number]    Encounter: [id number]      Assessment/Plan     Assessment: This is a 48y.o.-year-old male with type 1 diabetes with retinopathy on insulin pump therapy and hyperlipidemia. Plan:  1. Type 1 diabetes with insulin pump status:  His hemoglobin A1c is 7.9. However, his pump and sensor download appears to be improved with some mild hyperglycemia after breakfast with some low blood sugars before dinner.  For this I have adjusted his I:C ratio at noon 3.5. Also adjusted his basal rate at 8 AM slightly to 2.5. I have asked him to continue a proper diabetic diet and perform a download through [employer] in 2 weeks for review. We will contact him with any changes based upon the data from his upcoming report. Check hemoglobin A1c prior to next visit.     2. Peripheral neuropathy:  He continues with intermittent bilateral foot pain.  Continue gabapentin.   He is continuing to follow-up with vascular.  Follow up with Podiatry for regular diabetic foot care.      3.  Hypertension:  He is normotensive in the office today.  Continue Losartan. Check comprehensive metabolic panel prior to next visit     4. Hyperlipidemia:  Stable.  Continue current regimen.     CC: Type 1 Diabetes Follow Up    History of Present Illness     HPI:  51 y. o. year old male with type 1 diabetes for over 20 years.  He is on insulin at home and takes Novolog via Medtronic 670 g insulin pump.  Most recent hemoglobin A1c from April 15, 2023 is 8.0. He denies any polyuria, polydipsia, nocturia and blurry vision. He denies nephropathy but does admit to neuropathy and retinopathy.  His Medtronic 670 G insulin pump settings are as follows:          Current settings:  Basal rate  ? 0000 - 1.60 units/hr  ? 0800 - 2.20 units/hr  ? 1200 - 1.90 units/hr  ? 2200 - 1.60 units/hr  Carb ratios  ?  0:00 - 3.0  ? 12:00 - 2.0  ? 18:00 - 1.8  Insulin sensitivity 30  BG target 130       Hypoglycemic episodes: Afternoon and before dinner recently.     Follows with Dr Clark Joy for eye care regularly. Agatha Knight visit Froilan Lepe 2023. The patient's last foot exam was performed at last office visit on October 6, 2022.     Blood Sugar/Glucometer/Pump/CGM review:  Insulin pump download from July 13 through July 26, 2023 reveals an average sensor glucose of 162 with a standard deviation of 50. Auto mode 88% of the time.  Total daily dose is 66.2 units. 68% is basal with 32% bolus. He is experiencing lower blood sugars in the late afternoon and early evening before dinner. He is frequently walking the dog when he has these episodes of hypoglycemia which causes him to suspend his pump.     For hypertension, he is treated with losartan 50 mg daily.      For hyperlipidemia continues on simvastatin 40 mg daily.        Review of Systems   Constitutional: Positive for fatigue. Negative for chills and fever. HENT: Negative. Negative for trouble swallowing and voice change. Eyes: Negative for photophobia, pain, discharge, redness, itching and visual disturbance. Respiratory: Negative for cough and shortness of breath. Cardiovascular: Negative for chest pain and palpitations. Gastrointestinal: Negative for abdominal pain, constipation, diarrhea, nausea and vomiting. Endocrine: Positive for polyuria (1-2 times per night nocturia). Negative for cold intolerance, heat intolerance, polydipsia and polyphagia. Genitourinary: Negative. Musculoskeletal: Negative. Skin: Negative. Allergic/Immunologic: Negative. Neurological: Negative for dizziness, syncope, light-headedness and headaches. Hematological: Negative. Psychiatric/Behavioral: Negative. All other systems reviewed and are negative.       Historical Information   Past Medical History:   Diagnosis Date   • CPAP (continuous positive airway pressure) dependence    • Diabetes mellitus ([address])    • Diabetic retinopathy ([address])    • Hyperlipidemia    • Hypertension    • Neuropathy     feet   • Sleep apnea      Past Surgical History:   Procedure Laterality Date   • UPPER GASTROINTESTINAL ENDOSCOPY     • VASECTOMY       Social History   Social History     Substance and Sexual Activity   Alcohol Use Yes   • Alcohol/week: 14.0 standard drinks of alcohol   • Types: 10 Glasses of wine, 4 Cans of beer per week    Comment: 1 drink daily     Social History     Substance and Sexual Activity   Drug Use No     Social History     Tobacco Use   Smoking Status Former   • Packs/day: 0.50   • Years: 30.00   • Total pack years: 15.00   • Types: Cigarettes   • Quit date: 12/14/2012   • Years since quitting: 10.6   Smokeless Tobacco Never     Family History:   Family History   Problem Relation Age of Onset   • No Known Problems Mother    • Hyperlipidemia Father    • Hypertension Father    • Irritable bowel syndrome Father    • No Known Problems Brother        Meds/Allergies   Current Outpatient Medications   Medication Sig Dispense Refill   • Accu-Chek Guide test strip USE TO TEST 5 TIMES DAILY 500 strip 5   • albuterol (PROVENTIL HFA,VENTOLIN HFA) 90 mcg/act inhaler TAKE 2 PUFFS BY INHALATION ROUTE EVERY 4 HOURS AS NEEDED AND 20MINUTES PRIOR TO EXERCISE AS NEEDED     • Azelastine HCl 137 MCG/SPRAY SOLN      • buPROPion (WELLBUTRIN XL) 300 mg 24 hr tablet Take 300 mg by mouth daily  3   • fexofenadine (ALLEGRA) 180 MG tablet Take 180 mg by mouth daily     • fluticasone (FLONASE) 50 mcg/act nasal spray SPRAY 2 PUFFS NASALLY EVERY DAY     • gabapentin (NEURONTIN) 300 mg capsule TAKE 2 CAPSULES BY MOUTH TWICE A  capsule 1   • losartan (COZAAR) 50 mg tablet TAKE 1 TABLET BY MOUTH EVERY DAY 90 tablet 3   • metoclopramide (REGLAN) 5 mg tablet Take 1 tablet (5 mg total) by mouth 2 (two) times a day 60 tablet 12   • NovoLOG 100 UNIT/ML injection INJECT UP  UNITS DAILY VIA INSULIN PUMP 90 mL 3   • simvastatin (ZOCOR) 40 mg tablet TAKE 1 TABLET (40 MG TOTAL) BY MOUTH DAILY AT BEDTIME 90 tablet 3 • SUMAtriptan (IMITREX) 50 mg tablet TAKE 1 TABLET BY MOUTH TWICE A DAY WITH AT LEAST 2 HOURS BETWEEN DOSES AS NEEDED FOR 30 DAYS     • Symbicort 160-4.5 MCG/ACT inhaler Inhale 2 puffs 2 (two) times a day       No current facility-administered medications for this visit. Allergies   Allergen Reactions   • Grass Extracts [Gramineae Pollens] Other (See Comments)     Post nasal drip  cough   • Tree Extract Other (See Comments)     Post nasal drip  cough       Objective   Vitals: Blood pressure 118/82, pulse (!) 110, height 5' 11" (1.803 m), weight 102 kg (224 lb), SpO2 97 %. Physical Exam  Vitals reviewed. Constitutional:       Appearance: He is well-developed. HENT:      Head: Normocephalic and atraumatic. Nose: Nose normal.   Eyes:      Conjunctiva/sclera: Conjunctivae normal.      Pupils: Pupils are equal, round, and reactive to light. Cardiovascular:      Rate and Rhythm: Normal rate and regular rhythm. Heart sounds: Normal heart sounds. Pulmonary:      Effort: Pulmonary effort is normal.      Breath sounds: Normal breath sounds. Abdominal:      General: Bowel sounds are normal.      Palpations: Abdomen is soft. Musculoskeletal:         General: Normal range of motion. Cervical back: Normal range of motion and neck supple. Skin:     General: Skin is warm and dry. Neurological:      Mental Status: He is alert and oriented to person, place, and time. Psychiatric:         Behavior: Behavior normal.         Thought Content:  Thought content normal.         Judgment: Judgment normal.       Lab Results:   Lab Results   Component Value Date/Time    Hemoglobin A1C 7.9 (H) 07/20/2023 08:27 AM    Hemoglobin A1C 8.0 (H) 04/15/2023 08:55 AM    Hemoglobin A1C 7.8 (H) 01/06/2023 07:44 AM    White Blood Cell Count 10.3 07/20/2023 08:27 AM    White Blood Cell Count 10.2 04/15/2023 08:55 AM    White Blood Cell Count 12.4 (H) 01/06/2023 07:44 AM    Hemoglobin 14.5 07/20/2023 08:27 AM Hemoglobin 14.2 04/15/2023 08:55 AM    Hemoglobin 14.0 01/06/2023 07:44 AM    HCT 41.9 07/20/2023 08:27 AM    HCT 42.6 04/15/2023 08:55 AM    HCT 41.5 01/06/2023 07:44 AM    MCV 96 07/20/2023 08:27 AM    MCV 98 (H) 04/15/2023 08:55 AM    MCV 96 01/06/2023 07:44 AM    Platelet Count 747 22/77/5178 08:27 AM    Platelet Count 116 06/81/1663 08:55 AM    Platelet Count 645 72/97/6953 07:44 AM    BUN 8 07/20/2023 08:27 AM    BUN 8 04/15/2023 08:55 AM    BUN 9 01/06/2023 07:44 AM    Potassium 4.9 07/20/2023 08:27 AM    Potassium 5.3 (H) 04/15/2023 08:55 AM    Potassium 4.7 01/06/2023 07:44 AM    Chloride 99 07/20/2023 08:27 AM    Chloride 102 04/15/2023 08:55 AM    Chloride 100 01/06/2023 07:44 AM    CO2 25 07/20/2023 08:27 AM    CO2 26 04/15/2023 08:55 AM    CO2 24 01/06/2023 07:44 AM    Creatinine 0.94 07/20/2023 08:27 AM    Creatinine 1.04 04/15/2023 08:55 AM    Creatinine 0.93 01/06/2023 07:44 AM    AST 37 07/20/2023 08:27 AM    AST 27 04/15/2023 08:55 AM    AST 25 01/06/2023 07:44 AM    ALT 32 07/20/2023 08:27 AM    ALT 24 04/15/2023 08:55 AM    ALT 25 01/06/2023 07:44 AM    Protein, Total 7.3 07/20/2023 08:27 AM    Protein, Total 6.9 04/15/2023 08:55 AM    Protein, Total 6.7 01/06/2023 07:44 AM    Albumin 4.4 07/20/2023 08:27 AM    Albumin 4.3 04/15/2023 08:55 AM    Albumin 4.4 01/06/2023 07:44 AM    Globulin, Total 2.9 07/20/2023 08:27 AM    Globulin, Total 2.6 04/15/2023 08:55 AM    Globulin, Total 2.3 01/06/2023 07:44 AM    HDL 79 07/20/2023 08:27 AM    HDL 60 01/06/2023 07:44 AM    Triglycerides 74 07/20/2023 08:27 AM    Triglycerides 85 01/06/2023 07:44 AM     Portions of the record may have been created with voice recognition software. Occasional wrong word or "sound a like" substitutions may have occurred due to the inherent limitations of voice recognition software. Read the chart carefully and recognize, using context, where substitutions have occurred.

## 2023-08-05 DIAGNOSIS — E10.42 TYPE 1 DIABETES MELLITUS WITH DIABETIC POLYNEUROPATHY (HCC): ICD-10-CM

## 2023-08-07 RX ORDER — INSULIN ASPART 100 [IU]/ML
INJECTION, SOLUTION INTRAVENOUS; SUBCUTANEOUS
Qty: 90 ML | Refills: 3 | Status: SHIPPED | OUTPATIENT
Start: 2023-08-07

## 2023-08-28 DIAGNOSIS — G62.9 PERIPHERAL POLYNEUROPATHY: ICD-10-CM

## 2023-08-28 RX ORDER — GABAPENTIN 300 MG/1
CAPSULE ORAL
Qty: 360 CAPSULE | Refills: 1 | Status: SHIPPED | OUTPATIENT
Start: 2023-08-28

## 2023-09-11 ENCOUNTER — TELEPHONE (OUTPATIENT)
Dept: ENDOCRINOLOGY | Facility: HOSPITAL | Age: 53
End: 2023-09-11

## 2023-09-12 NOTE — TELEPHONE ENCOUNTER
The patient stated that he is not eating till about 8 pm and they are not problematic for him. He stated that he not having any other issues at this time.

## 2023-09-22 ENCOUNTER — DOCUMENTATION (OUTPATIENT)
Dept: ENDOCRINOLOGY | Facility: HOSPITAL | Age: 53
End: 2023-09-22

## 2023-09-25 ENCOUNTER — TELEPHONE (OUTPATIENT)
Dept: DIABETES SERVICES | Facility: CLINIC | Age: 53
End: 2023-09-25

## 2023-09-25 NOTE — TELEPHONE ENCOUNTER
Received notification from [employer] that patient's pump was shipped. Called to schedule training. Pt said he could not schedule as he was on another call.

## 2023-10-06 NOTE — TELEPHONE ENCOUNTER
Called and spoke to patient. He said he never received the CGM and he is being told that it is on backorder.  I asked him to cb when he receives the CGM

## 2023-10-10 ENCOUNTER — OFFICE VISIT (OUTPATIENT)
Dept: GASTROENTEROLOGY | Facility: CLINIC | Age: 53
End: 2023-10-10
Payer: COMMERCIAL

## 2023-10-10 VITALS
DIASTOLIC BLOOD PRESSURE: 70 MMHG | SYSTOLIC BLOOD PRESSURE: 130 MMHG | HEIGHT: 71 IN | BODY MASS INDEX: 31.98 KG/M2 | WEIGHT: 228.4 LBS

## 2023-10-10 DIAGNOSIS — R11.14 BILIOUS VOMITING WITH NAUSEA: ICD-10-CM

## 2023-10-10 DIAGNOSIS — E11.43 DIABETIC GASTROPARESIS: Primary | ICD-10-CM

## 2023-10-10 DIAGNOSIS — Z86.010 PERSONAL HISTORY OF COLONIC POLYPS: ICD-10-CM

## 2023-10-10 DIAGNOSIS — K31.84 DIABETIC GASTROPARESIS: Primary | ICD-10-CM

## 2023-10-10 PROCEDURE — 99213 OFFICE O/P EST LOW 20 MIN: CPT | Performed by: INTERNAL MEDICINE

## 2023-10-10 NOTE — LETTER
October 10, 2023     [address]  [address] [address]    Patient: Hubert Nash   YOB: 1970   Date of Visit: 10/10/2023       Dear Dr. Phylicia Gibbs: Thank you for referring Hubert Nash to me for evaluation. Below are my notes for this consultation. If you have questions, please do not hesitate to call me. I look forward to following your patient along with you. Sincerely,        Luci Olson. Deisi Marie MD        CC: JOAQUINA Archer. Deisi Marie MD  10/10/2023  4:55 PM  Sign when Signing Visit  [address] [facility] - Outpatient Follow-up Note  Hubert Nash 48 y.o. male MRN: [id number]  Encounter: [id number]    ASSESSMENT AND PLAN:      1. Diabetic gastroparesis   2. Bilious vomiting with nausea  Seems to be doing better on Reglan 5 mg twice a day. Continues to try to work on his blood sugars although his hemoglobin A1c is still over 7  -Continue Reglan 5 mg twice a day  -Follow-up with endocrinology to continue to work on tighter blood sugar control    3. Personal history of colonic polyps  Last colonoscopy June 2022. Due for follow-up 2027      Followup Appointment: 6 months  ______________________________________________________________________    Chief Complaint   Patient presents with   • Follow-up     Vomiting yesterday (had been doing pretty well)     HPI: Patient is seen back in the office today from a nausea/vomiting and diabetic gastroparesis standpoint he is doing relatively well. He continues take Reglan 5 mg twice a day. He has no side effect from this medication. Seems to be have less nausea and vomiting although he reports yesterday woke up in the morning and had bilious vomiting. He reports that his seasonal allergies were acting up and he forgot to take his medications which he thinks is contributing to it. Denies any heartburn or indigestion. He denies any dysphagia, odynophagia, or early satiety.   Moving his bowels regularly. He denies any GI bleeding. His weight is relatively stable. He continues to work with endocrinology but his hemoglobin A1c is still over 7.     Historical Information   Past Medical History:   Diagnosis Date   • CPAP (continuous positive airway pressure) dependence    • Diabetes mellitus (HCC)    • Diabetic retinopathy ([address])    • Hyperlipidemia    • Hypertension    • Neuropathy     feet   • Sleep apnea      Past Surgical History:   Procedure Laterality Date   • UPPER GASTROINTESTINAL ENDOSCOPY     • VASECTOMY       Social History     Substance and Sexual Activity   Alcohol Use Yes   • Alcohol/week: 14.0 standard drinks of alcohol   • Types: 10 Glasses of wine, 4 Cans of beer per week    Comment: 1 drink daily     Social History     Substance and Sexual Activity   Drug Use No     Social History     Tobacco Use   Smoking Status Former   • Packs/day: 0.50   • Years: 30.00   • Total pack years: 15.00   • Types: Cigarettes   • Quit date: 12/14/2012   • Years since quitting: 10.8   Smokeless Tobacco Never     Family History   Problem Relation Age of Onset   • No Known Problems Mother    • Hyperlipidemia Father    • Hypertension Father    • Irritable bowel syndrome Father    • No Known Problems Brother          Current Outpatient Medications:   •  Accu-Chek Guide test strip  •  albuterol (PROVENTIL HFA,VENTOLIN HFA) 90 mcg/act inhaler  •  Azelastine HCl 137 MCG/SPRAY SOLN  •  buPROPion (WELLBUTRIN XL) 300 mg 24 hr tablet  •  cyanocobalamin (VITAMIN B-12) 500 MCG tablet  •  fexofenadine (ALLEGRA) 180 MG tablet  •  fluticasone (FLONASE) 50 mcg/act nasal spray  •  gabapentin (NEURONTIN) 300 mg capsule  •  losartan (COZAAR) 50 mg tablet  •  metoclopramide (REGLAN) 5 mg tablet  •  NovoLOG 100 UNIT/ML injection  •  simvastatin (ZOCOR) 40 mg tablet  •  SUMAtriptan (IMITREX) 50 mg tablet  •  Symbicort 160-4.5 MCG/ACT inhaler  Allergies   Allergen Reactions   • Grass Extracts [Gramineae Pollens] Other (See Comments) Post nasal drip  cough   • Tree Extract Other (See Comments)     Post nasal drip  cough     Reviewed medications and allergies and updated as indicated    PHYSICAL EXAM:    Blood pressure 130/70, height 5' 10.5" (1.791 m), weight 104 kg (228 lb 6.4 oz). Body mass index is 32.31 kg/m². General Appearance: NAD, cooperative, alert  Eyes: Anicteric, PERRLA, EOMI  ENT:  Normocephalic, atraumatic, normal mucosa. Neck:  Supple, symmetrical, trachea midline  Resp:  Clear to auscultation bilaterally; no rales, rhonchi or wheezing; respirations unlabored   CV:  S1 S2, Regular rate and rhythm; no murmur, rub, or gallop. GI:  Soft, non-tender, non-distended; normal bowel sounds; no masses, no organomegaly   Rectal: Deferred  Musculoskeletal: No cyanosis, clubbing or edema. Normal ROM. Skin:  No jaundice, rashes, or lesions   Heme/Lymph: No palpable cervical lymphadenopathy  Psych: Normal affect, good eye contact  Neuro: No gross deficits, AAOx3    Lab Results:   Lab Results   Component Value Date    WBC 10.3 07/20/2023    HGB 14.5 07/20/2023    HCT 41.9 07/20/2023    MCV 96 07/20/2023     07/20/2023     Lab Results   Component Value Date    K 4.9 07/20/2023    CL 99 07/20/2023    CO2 25 07/20/2023    BUN 8 07/20/2023    CREATININE 0.94 07/20/2023    AST 37 07/20/2023    ALT 32 07/20/2023    EGFR 98 07/20/2023       Radiology Results:   No results found.

## 2023-10-10 NOTE — PATIENT INSTRUCTIONS
Continue Reglan 5 mg twice a day. Okay to take an extra dose if you need to.   Follow-up office visit 6 months

## 2023-10-10 NOTE — PROGRESS NOTES
[facility]oyt [facility] - Outpatient Follow-up Note  Kaylyn Murrieta 48 y.o. male MRN: [id number]  Encounter: [id number]    ASSESSMENT AND PLAN:      1. Diabetic gastroparesis   2. Bilious vomiting with nausea  Seems to be doing better on Reglan 5 mg twice a day. Continues to try to work on his blood sugars although his hemoglobin A1c is still over 7  -Continue Reglan 5 mg twice a day  -Follow-up with endocrinology to continue to work on tighter blood sugar control    3. Personal history of colonic polyps  Last colonoscopy June 2022. Due for follow-up 2027      Followup Appointment: 6 months  ______________________________________________________________________    Chief Complaint   Patient presents with   • Follow-up     Vomiting yesterday (had been doing pretty well)     HPI: Patient is seen back in the office today from a nausea/vomiting and diabetic gastroparesis standpoint he is doing relatively well. He continues take Reglan 5 mg twice a day. He has no side effect from this medication. Seems to be have less nausea and vomiting although he reports yesterday woke up in the morning and had bilious vomiting. He reports that his seasonal allergies were acting up and he forgot to take his medications which he thinks is contributing to it. Denies any heartburn or indigestion. He denies any dysphagia, odynophagia, or early satiety. Moving his bowels regularly. He denies any GI bleeding. His weight is relatively stable. He continues to work with endocrinology but his hemoglobin A1c is still over 7.     Historical Information   Past Medical History:   Diagnosis Date   • CPAP (continuous positive airway pressure) dependence    • Diabetes mellitus ([address])    • Diabetic retinopathy ([address])    • Hyperlipidemia    • Hypertension    • Neuropathy     feet   • Sleep apnea      Past Surgical History:   Procedure Laterality Date   • UPPER GASTROINTESTINAL ENDOSCOPY     • VASECTOMY       Social History Substance and Sexual Activity   Alcohol Use Yes   • Alcohol/week: 14.0 standard drinks of alcohol   • Types: 10 Glasses of wine, 4 Cans of beer per week    Comment: 1 drink daily     Social History     Substance and Sexual Activity   Drug Use No     Social History     Tobacco Use   Smoking Status Former   • Packs/day: 0.50   • Years: 30.00   • Total pack years: 15.00   • Types: Cigarettes   • Quit date: 12/14/2012   • Years since quitting: 10.8   Smokeless Tobacco Never     Family History   Problem Relation Age of Onset   • No Known Problems Mother    • Hyperlipidemia Father    • Hypertension Father    • Irritable bowel syndrome Father    • No Known Problems Brother          Current Outpatient Medications:   •  Accu-Chek Guide test strip  •  albuterol (PROVENTIL HFA,VENTOLIN HFA) 90 mcg/act inhaler  •  Azelastine HCl 137 MCG/SPRAY SOLN  •  buPROPion (WELLBUTRIN XL) 300 mg 24 hr tablet  •  cyanocobalamin (VITAMIN B-12) 500 MCG tablet  •  fexofenadine (ALLEGRA) 180 MG tablet  •  fluticasone (FLONASE) 50 mcg/act nasal spray  •  gabapentin (NEURONTIN) 300 mg capsule  •  losartan (COZAAR) 50 mg tablet  •  metoclopramide (REGLAN) 5 mg tablet  •  NovoLOG 100 UNIT/ML injection  •  simvastatin (ZOCOR) 40 mg tablet  •  SUMAtriptan (IMITREX) 50 mg tablet  •  Symbicort 160-4.5 MCG/ACT inhaler  Allergies   Allergen Reactions   • Grass Extracts [Gramineae Pollens] Other (See Comments)     Post nasal drip  cough   • Tree Extract Other (See Comments)     Post nasal drip  cough     Reviewed medications and allergies and updated as indicated    PHYSICAL EXAM:    Blood pressure 130/70, height 5' 10.5" (1.791 m), weight 104 kg (228 lb 6.4 oz). Body mass index is 32.31 kg/m². General Appearance: NAD, cooperative, alert  Eyes: Anicteric, PERRLA, EOMI  ENT:  Normocephalic, atraumatic, normal mucosa.     Neck:  Supple, symmetrical, trachea midline  Resp:  Clear to auscultation bilaterally; no rales, rhonchi or wheezing; respirations unlabored   CV:  S1 S2, Regular rate and rhythm; no murmur, rub, or gallop. GI:  Soft, non-tender, non-distended; normal bowel sounds; no masses, no organomegaly   Rectal: Deferred  Musculoskeletal: No cyanosis, clubbing or edema. Normal ROM. Skin:  No jaundice, rashes, or lesions   Heme/Lymph: No palpable cervical lymphadenopathy  Psych: Normal affect, good eye contact  Neuro: No gross deficits, AAOx3    Lab Results:   Lab Results   Component Value Date    WBC 10.3 07/20/2023    HGB 14.5 07/20/2023    HCT 41.9 07/20/2023    MCV 96 07/20/2023     07/20/2023     Lab Results   Component Value Date    K 4.9 07/20/2023    CL 99 07/20/2023    CO2 25 07/20/2023    BUN 8 07/20/2023    CREATININE 0.94 07/20/2023    AST 37 07/20/2023    ALT 32 07/20/2023    EGFR 98 07/20/2023       Radiology Results:   No results found.

## 2023-10-31 LAB
ALBUMIN SERPL-MCNC: 4.2 G/DL (ref 3.8–4.9)
ALBUMIN/GLOB SERPL: 1.4 {RATIO} (ref 1.2–2.2)
ALP SERPL-CCNC: 88 IU/L (ref 44–121)
ALT SERPL-CCNC: 29 IU/L (ref 0–44)
AST SERPL-CCNC: 36 IU/L (ref 0–40)
BASOPHILS # BLD AUTO: 0.1 X10E3/UL (ref 0–0.2)
BASOPHILS NFR BLD AUTO: 1 %
BILIRUB SERPL-MCNC: 0.3 MG/DL (ref 0–1.2)
BUN SERPL-MCNC: 10 MG/DL (ref 6–24)
BUN/CREAT SERPL: 11 (ref 9–20)
CALCIUM SERPL-MCNC: 9.6 MG/DL (ref 8.7–10.2)
CHLORIDE SERPL-SCNC: 98 MMOL/L (ref 96–106)
CO2 SERPL-SCNC: 22 MMOL/L (ref 20–29)
CREAT SERPL-MCNC: 0.93 MG/DL (ref 0.76–1.27)
EGFR: 99 ML/MIN/1.73
EOSINOPHIL # BLD AUTO: 0.3 X10E3/UL (ref 0–0.4)
EOSINOPHIL NFR BLD AUTO: 4 %
ERYTHROCYTE [DISTWIDTH] IN BLOOD BY AUTOMATED COUNT: 12.5 % (ref 11.6–15.4)
EST. AVERAGE GLUCOSE BLD GHB EST-MCNC: 183 MG/DL
GLOBULIN SER-MCNC: 2.9 G/DL (ref 1.5–4.5)
GLUCOSE SERPL-MCNC: 153 MG/DL (ref 70–99)
HBA1C MFR BLD: 8 % (ref 4.8–5.6)
HCT VFR BLD AUTO: 39.4 % (ref 37.5–51)
HGB BLD-MCNC: 13.8 G/DL (ref 13–17.7)
IMM GRANULOCYTES # BLD: 0 X10E3/UL (ref 0–0.1)
IMM GRANULOCYTES NFR BLD: 1 %
LYMPHOCYTES # BLD AUTO: 3.2 X10E3/UL (ref 0.7–3.1)
LYMPHOCYTES NFR BLD AUTO: 37 %
MCH RBC QN AUTO: 33.3 PG (ref 26.6–33)
MCHC RBC AUTO-ENTMCNC: 35 G/DL (ref 31.5–35.7)
MCV RBC AUTO: 95 FL (ref 79–97)
MONOCYTES # BLD AUTO: 0.7 X10E3/UL (ref 0.1–0.9)
MONOCYTES NFR BLD AUTO: 8 %
NEUTROPHILS # BLD AUTO: 4.2 X10E3/UL (ref 1.4–7)
NEUTROPHILS NFR BLD AUTO: 49 %
PLATELET # BLD AUTO: 269 X10E3/UL (ref 150–450)
POTASSIUM SERPL-SCNC: 4.5 MMOL/L (ref 3.5–5.2)
PROT SERPL-MCNC: 7.1 G/DL (ref 6–8.5)
RBC # BLD AUTO: 4.14 X10E6/UL (ref 4.14–5.8)
SODIUM SERPL-SCNC: 135 MMOL/L (ref 134–144)
WBC # BLD AUTO: 8.5 X10E3/UL (ref 3.4–10.8)

## 2023-11-01 ENCOUNTER — OFFICE VISIT (OUTPATIENT)
Dept: ENDOCRINOLOGY | Facility: HOSPITAL | Age: 53
End: 2023-11-01
Payer: COMMERCIAL

## 2023-11-01 VITALS
OXYGEN SATURATION: 97 % | HEART RATE: 95 BPM | DIASTOLIC BLOOD PRESSURE: 78 MMHG | HEIGHT: 71 IN | SYSTOLIC BLOOD PRESSURE: 134 MMHG | WEIGHT: 225 LBS | BODY MASS INDEX: 31.5 KG/M2

## 2023-11-01 DIAGNOSIS — E03.8 CENTRAL HYPOTHYROIDISM: ICD-10-CM

## 2023-11-01 DIAGNOSIS — G62.9 PERIPHERAL POLYNEUROPATHY: ICD-10-CM

## 2023-11-01 DIAGNOSIS — E11.43 DIABETIC GASTROPARESIS: ICD-10-CM

## 2023-11-01 DIAGNOSIS — E78.49 OTHER HYPERLIPIDEMIA: ICD-10-CM

## 2023-11-01 DIAGNOSIS — Z96.41 INSULIN PUMP STATUS: ICD-10-CM

## 2023-11-01 DIAGNOSIS — I10 ESSENTIAL HYPERTENSION: ICD-10-CM

## 2023-11-01 DIAGNOSIS — E10.319 DIABETIC RETINOPATHY ASSOCIATED WITH TYPE 1 DIABETES MELLITUS, MACULAR EDEMA PRESENCE UNSPECIFIED, UNSPECIFIED LATERALITY, UNSPECIFIED RETINOPATHY SEVERITY (HCC): ICD-10-CM

## 2023-11-01 DIAGNOSIS — E10.42 TYPE 1 DIABETES MELLITUS WITH DIABETIC POLYNEUROPATHY (HCC): Primary | ICD-10-CM

## 2023-11-01 DIAGNOSIS — K31.84 DIABETIC GASTROPARESIS: ICD-10-CM

## 2023-11-01 PROCEDURE — 99214 OFFICE O/P EST MOD 30 MIN: CPT | Performed by: NURSE PRACTITIONER

## 2023-11-01 PROCEDURE — 95251 CONT GLUC MNTR ANALYSIS I&R: CPT | Performed by: NURSE PRACTITIONER

## 2023-11-01 NOTE — PATIENT INSTRUCTIONS
Be mindful of diet. Stay active and stay hydrated. We made slight changes to your pump settings to help with elevated blood sugar readings after meals     Please perform a download of her pump/sensor via [employer] in the next few weeks for review. Continue Losartan. Continue Simvastatin. Obtain lab work as prescribed. Follow up with podiatry.

## 2023-11-01 NOTE — PROGRESS NOTES
Qi Marti 48 y.o. male MRN: [id number]    Encounter: [id number]      Assessment/Plan     Assessment: This is a 48y.o.-year-old male with type 1 diabetes with retinopathy on insulin pump therapy and hyperlipidemia. Plan:  1. Type 1 diabetes with insulin pump status:  His hemoglobin A1c is 8.0. However, his pump and sensor download appears to be improved with some mild hyperglycemia after meals. For this, I have adjusted his I:C ratio at night to 1.5, noon 3.0 6 PM to 1.5 I have asked him to continue a proper diabetic diet and perform a download through [address] [place] [address] in 2 weeks for review. We will contact him with any changes based upon the data from his upcoming report. Check hemoglobin A1c prior to next visit. 2. Peripheral neuropathy:  He continues with intermittent bilateral foot pain. Continue gabapentin. He is continuing to follow-up with vascular. Follow up with Podiatry for regular diabetic foot care. 3.  Hypertension: His blood pressure is reasonable in the office today. Continue Losartan. Check comprehensive metabolic panel prior to next visit     4. Hyperlipidemia:  Continue current regimen. Check fasting lipid panel prior to next visit. CC: Type 1 Diabetes Follow Up    History of Present Illness     HPI:  48y.o. year old male with type 1 diabetes for over 20 years. He is on insulin at home and takes Novolog via Medtronic 780 g insulin pump the guardian 4 sensor. Most recent hemoglobin A1c from October 30, 2023 is 8.0. He denies any polyuria, polydipsia, nocturia and blurry vision. He denies nephropathy but does admit to neuropathy and retinopathy.   His Medtronic 670 G insulin pump settings are as follows:          Current settings:  Basal rate  0000 - 1.60 units/hr  0800 - 2.20 units/hr  1200 - 1.90 units/hr  2200 - 1.60 units/hr  Carb ratios  0:00 - 3.0  12:00 - 2.0  18:00 - 1.8  Insulin sensitivity 30  BG target 130      Hypoglycemic episodes: Afternoon and before dinner recently. Follows with Dr Alina Villaseñor for eye care regularly. Last visit was in March 2023. The patient's last foot exam was performed at last office visit on October 6, 2022. Blood Sugar/Glucometer/Pump/CGM review: Insulin pump download from October 19 through November 1, 2023 reveals an average sensor glucose of 158 with a standard deviation of 34. He is utilizing a total daily dose of insulin at 65.4 units 56% of which is bolus and 44 is basal.     For hypertension, he is treated with losartan 50 mg daily. For hyperlipidemia continues on simvastatin 40 mg daily. Review of Systems   Constitutional:  Positive for fatigue. Negative for chills and fever. HENT: Negative. Negative for trouble swallowing and voice change. Eyes:  Negative for photophobia, pain, discharge, redness, itching and visual disturbance. Respiratory:  Negative for cough and shortness of breath. Cardiovascular:  Negative for chest pain and palpitations. Gastrointestinal:  Negative for abdominal pain, constipation, diarrhea, nausea and vomiting. Endocrine: Positive for polyuria (up to twice per night). Negative for cold intolerance, heat intolerance, polydipsia and polyphagia. Genitourinary: Negative. Musculoskeletal: Negative. Skin: Negative. Allergic/Immunologic: Negative. Neurological:  Negative for dizziness, syncope, light-headedness and headaches. Hematological: Negative. Psychiatric/Behavioral: Negative. All other systems reviewed and are negative.       Historical Information   Past Medical History:   Diagnosis Date    CPAP (continuous positive airway pressure) dependence     Diabetes mellitus (HCC)     Diabetic retinopathy ([address])     Hyperlipidemia     Hypertension     Neuropathy     feet    Sleep apnea      Past Surgical History:   Procedure Laterality Date    UPPER GASTROINTESTINAL ENDOSCOPY      VASECTOMY       Social History   Social History     Substance and Sexual Activity Alcohol Use Yes    Alcohol/week: 14.0 standard drinks of alcohol    Types: 10 Glasses of wine, 4 Cans of beer per week    Comment: 1 drink daily     Social History     Substance and Sexual Activity   Drug Use No     Social History     Tobacco Use   Smoking Status Former    Packs/day: 0.50    Years: 30.00    Total pack years: 15.00    Types: Cigarettes    Quit date: 12/14/2012    Years since quitting: 10.8   Smokeless Tobacco Never     Family History:   Family History   Problem Relation Age of Onset    No Known Problems Mother     Hyperlipidemia Father     Hypertension Father     Irritable bowel syndrome Father     No Known Problems Brother        Meds/Allergies   Current Outpatient Medications   Medication Sig Dispense Refill    Accu-Chek Guide test strip USE TO TEST 5 TIMES DAILY 500 strip 5    albuterol (PROVENTIL HFA,VENTOLIN HFA) 90 mcg/act inhaler TAKE 2 PUFFS BY INHALATION ROUTE EVERY 4 HOURS AS NEEDED AND 20MINUTES PRIOR TO EXERCISE AS NEEDED      Azelastine HCl 137 MCG/SPRAY SOLN       buPROPion (WELLBUTRIN XL) 300 mg 24 hr tablet Take 300 mg by mouth daily  3    cyanocobalamin (VITAMIN B-12) 500 MCG tablet Take 500 mcg by mouth daily      fexofenadine (ALLEGRA) 180 MG tablet Take 180 mg by mouth daily      fluticasone (FLONASE) 50 mcg/act nasal spray SPRAY 2 PUFFS NASALLY EVERY DAY      gabapentin (NEURONTIN) 300 mg capsule TAKE 2 CAPSULES BY MOUTH TWICE A  capsule 1    losartan (COZAAR) 50 mg tablet TAKE 1 TABLET BY MOUTH EVERY DAY 90 tablet 3    metoclopramide (REGLAN) 5 mg tablet Take 1 tablet (5 mg total) by mouth 2 (two) times a day 60 tablet 12    NovoLOG 100 UNIT/ML injection INJECT UP  UNITS DAILY VIA INSULIN PUMP 90 mL 3    simvastatin (ZOCOR) 40 mg tablet TAKE 1 TABLET (40 MG TOTAL) BY MOUTH DAILY AT BEDTIME 90 tablet 3    SUMAtriptan (IMITREX) 50 mg tablet TAKE 1 TABLET BY MOUTH TWICE A DAY WITH AT LEAST 2 HOURS BETWEEN DOSES AS NEEDED FOR 30 DAYS      Symbicort 160-4.5 MCG/ACT inhaler Inhale 2 puffs 2 (two) times a day       No current facility-administered medications for this visit. Allergies   Allergen Reactions    Grass Extracts [Gramineae Pollens] Other (See Comments)     Post nasal drip  cough    Tree Extract Other (See Comments)     Post nasal drip  cough       Objective   Vitals: There were no vitals taken for this visit. Physical Exam  Vitals reviewed. Constitutional:       Appearance: He is well-developed. HENT:      Head: Normocephalic and atraumatic. Nose: Nose normal.   Eyes:      Conjunctiva/sclera: Conjunctivae normal.      Pupils: Pupils are equal, round, and reactive to light. Cardiovascular:      Rate and Rhythm: Normal rate and regular rhythm. Heart sounds: Normal heart sounds. Pulmonary:      Effort: Pulmonary effort is normal.      Breath sounds: Normal breath sounds. Abdominal:      General: Bowel sounds are normal.      Palpations: Abdomen is soft. Musculoskeletal:         General: Normal range of motion. Cervical back: Normal range of motion and neck supple. Skin:     General: Skin is warm and dry. Neurological:      Mental Status: He is alert and oriented to person, place, and time. Psychiatric:         Behavior: Behavior normal.         Thought Content:  Thought content normal.         Judgment: Judgment normal.       Lab Results:   Lab Results   Component Value Date/Time    Hemoglobin A1C 8.0 (H) 10/30/2023 07:59 AM    Hemoglobin A1C 7.9 (H) 07/20/2023 08:27 AM    Hemoglobin A1C 8.0 (H) 04/15/2023 08:55 AM    White Blood Cell Count 8.5 10/30/2023 07:59 AM    White Blood Cell Count 10.3 07/20/2023 08:27 AM    White Blood Cell Count 10.2 04/15/2023 08:55 AM    Hemoglobin 13.8 10/30/2023 07:59 AM    Hemoglobin 14.5 07/20/2023 08:27 AM    Hemoglobin 14.2 04/15/2023 08:55 AM    HCT 39.4 10/30/2023 07:59 AM    HCT 41.9 07/20/2023 08:27 AM    HCT 42.6 04/15/2023 08:55 AM    MCV 95 10/30/2023 07:59 AM    MCV 96 07/20/2023 08:27 AM MCV 98 (H) 04/15/2023 08:55 AM    Platelet Count 051 73/34/2081 07:59 AM    Platelet Count 653 11/59/4785 08:27 AM    Platelet Count 979 43/90/0704 08:55 AM    BUN 10 10/30/2023 07:59 AM    BUN 8 07/20/2023 08:27 AM    BUN 8 04/15/2023 08:55 AM    Potassium 4.5 10/30/2023 07:59 AM    Potassium 4.9 07/20/2023 08:27 AM    Potassium 5.3 (H) 04/15/2023 08:55 AM    Chloride 98 10/30/2023 07:59 AM    Chloride 99 07/20/2023 08:27 AM    Chloride 102 04/15/2023 08:55 AM    CO2 22 10/30/2023 07:59 AM    CO2 25 07/20/2023 08:27 AM    CO2 26 04/15/2023 08:55 AM    Creatinine 0.93 10/30/2023 07:59 AM    Creatinine 0.94 07/20/2023 08:27 AM    Creatinine 1.04 04/15/2023 08:55 AM    AST 36 10/30/2023 07:59 AM    AST 37 07/20/2023 08:27 AM    AST 27 04/15/2023 08:55 AM    ALT 29 10/30/2023 07:59 AM    ALT 32 07/20/2023 08:27 AM    ALT 24 04/15/2023 08:55 AM    Protein, Total 7.1 10/30/2023 07:59 AM    Protein, Total 7.3 07/20/2023 08:27 AM    Protein, Total 6.9 04/15/2023 08:55 AM    Albumin 4.2 10/30/2023 07:59 AM    Albumin 4.4 07/20/2023 08:27 AM    Albumin 4.3 04/15/2023 08:55 AM    Globulin, Total 2.9 10/30/2023 07:59 AM    Globulin, Total 2.9 07/20/2023 08:27 AM    Globulin, Total 2.6 04/15/2023 08:55 AM    HDL 79 07/20/2023 08:27 AM    HDL 60 01/06/2023 07:44 AM    Triglycerides 74 07/20/2023 08:27 AM    Triglycerides 85 01/06/2023 07:44 AM     Portions of the record may have been created with voice recognition software. Occasional wrong word or "sound a like" substitutions may have occurred due to the inherent limitations of voice recognition software. Read the chart carefully and recognize, using context, where substitutions have occurred.

## 2023-11-30 DIAGNOSIS — E78.5 HYPERLIPIDEMIA, UNSPECIFIED HYPERLIPIDEMIA TYPE: ICD-10-CM

## 2023-11-30 RX ORDER — SIMVASTATIN 40 MG
40 TABLET ORAL
Qty: 90 TABLET | Refills: 1 | Status: SHIPPED | OUTPATIENT
Start: 2023-11-30

## 2024-01-27 DIAGNOSIS — G62.9 PERIPHERAL POLYNEUROPATHY: ICD-10-CM

## 2024-01-27 DIAGNOSIS — I10 ESSENTIAL HYPERTENSION: ICD-10-CM

## 2024-01-29 RX ORDER — GABAPENTIN 300 MG/1
CAPSULE ORAL
Qty: 360 CAPSULE | Refills: 1 | Status: SHIPPED | OUTPATIENT
Start: 2024-01-29

## 2024-01-29 RX ORDER — LOSARTAN POTASSIUM 50 MG/1
TABLET ORAL
Qty: 90 TABLET | Refills: 3 | Status: SHIPPED | OUTPATIENT
Start: 2024-01-29

## 2024-02-06 LAB
ALBUMIN SERPL-MCNC: 4.3 G/DL (ref 3.8–4.9)
ALBUMIN/CREAT UR: 35 MG/G CREAT (ref 0–29)
ALBUMIN/GLOB SERPL: 1.6 {RATIO} (ref 1.2–2.2)
ALP SERPL-CCNC: 90 IU/L (ref 44–121)
ALT SERPL-CCNC: 34 IU/L (ref 0–44)
AST SERPL-CCNC: 41 IU/L (ref 0–40)
BASOPHILS # BLD AUTO: 0.1 X10E3/UL (ref 0–0.2)
BASOPHILS NFR BLD AUTO: 1 %
BILIRUB SERPL-MCNC: 0.3 MG/DL (ref 0–1.2)
BUN SERPL-MCNC: 9 MG/DL (ref 6–24)
BUN/CREAT SERPL: 10 (ref 9–20)
CALCIUM SERPL-MCNC: 9.1 MG/DL (ref 8.7–10.2)
CHLORIDE SERPL-SCNC: 98 MMOL/L (ref 96–106)
CHOLEST SERPL-MCNC: 168 MG/DL (ref 100–199)
CHOLEST/HDLC SERPL: 2.3 RATIO (ref 0–5)
CO2 SERPL-SCNC: 24 MMOL/L (ref 20–29)
CREAT SERPL-MCNC: 0.93 MG/DL (ref 0.76–1.27)
CREAT UR-MCNC: 61.4 MG/DL
EGFR: 99 ML/MIN/1.73
EOSINOPHIL # BLD AUTO: 0.4 X10E3/UL (ref 0–0.4)
EOSINOPHIL NFR BLD AUTO: 4 %
ERYTHROCYTE [DISTWIDTH] IN BLOOD BY AUTOMATED COUNT: 13 % (ref 11.6–15.4)
EST. AVERAGE GLUCOSE BLD GHB EST-MCNC: 180 MG/DL
GLOBULIN SER-MCNC: 2.7 G/DL (ref 1.5–4.5)
GLUCOSE SERPL-MCNC: 123 MG/DL (ref 70–99)
HBA1C MFR BLD: 7.9 % (ref 4.8–5.6)
HCT VFR BLD AUTO: 41.6 % (ref 37.5–51)
HDLC SERPL-MCNC: 72 MG/DL
HGB BLD-MCNC: 14.4 G/DL (ref 13–17.7)
IMM GRANULOCYTES # BLD: 0 X10E3/UL (ref 0–0.1)
IMM GRANULOCYTES NFR BLD: 0 %
LDLC SERPL CALC-MCNC: 81 MG/DL (ref 0–99)
LYMPHOCYTES # BLD AUTO: 3 X10E3/UL (ref 0.7–3.1)
LYMPHOCYTES NFR BLD AUTO: 31 %
MCH RBC QN AUTO: 34.4 PG (ref 26.6–33)
MCHC RBC AUTO-ENTMCNC: 34.6 G/DL (ref 31.5–35.7)
MCV RBC AUTO: 100 FL (ref 79–97)
MICROALBUMIN UR-MCNC: 21.6 UG/ML
MONOCYTES # BLD AUTO: 0.9 X10E3/UL (ref 0.1–0.9)
MONOCYTES NFR BLD AUTO: 9 %
NEUTROPHILS # BLD AUTO: 5.4 X10E3/UL (ref 1.4–7)
NEUTROPHILS NFR BLD AUTO: 55 %
PLATELET # BLD AUTO: 308 X10E3/UL (ref 150–450)
POTASSIUM SERPL-SCNC: 4.7 MMOL/L (ref 3.5–5.2)
PROT SERPL-MCNC: 7 G/DL (ref 6–8.5)
RBC # BLD AUTO: 4.18 X10E6/UL (ref 4.14–5.8)
SL AMB VLDL CHOLESTEROL CALC: 15 MG/DL (ref 5–40)
SODIUM SERPL-SCNC: 137 MMOL/L (ref 134–144)
TRIGL SERPL-MCNC: 82 MG/DL (ref 0–149)
TSH SERPL DL<=0.005 MIU/L-ACNC: 1.92 UIU/ML (ref 0.45–4.5)
WBC # BLD AUTO: 9.8 X10E3/UL (ref 3.4–10.8)

## 2024-02-07 ENCOUNTER — OFFICE VISIT (OUTPATIENT)
Dept: ENDOCRINOLOGY | Facility: HOSPITAL | Age: 54
End: 2024-02-07
Payer: COMMERCIAL

## 2024-02-07 VITALS — HEIGHT: 71 IN | BODY MASS INDEX: 32.06 KG/M2 | WEIGHT: 229 LBS

## 2024-02-07 DIAGNOSIS — E10.319 DIABETIC RETINOPATHY ASSOCIATED WITH TYPE 1 DIABETES MELLITUS, MACULAR EDEMA PRESENCE UNSPECIFIED, UNSPECIFIED LATERALITY, UNSPECIFIED RETINOPATHY SEVERITY (HCC): ICD-10-CM

## 2024-02-07 DIAGNOSIS — E10.42 TYPE 1 DIABETES MELLITUS WITH DIABETIC POLYNEUROPATHY (HCC): Primary | ICD-10-CM

## 2024-02-07 DIAGNOSIS — K31.84 DIABETIC GASTROPARESIS: ICD-10-CM

## 2024-02-07 DIAGNOSIS — E11.43 DIABETIC GASTROPARESIS: ICD-10-CM

## 2024-02-07 DIAGNOSIS — E78.49 OTHER HYPERLIPIDEMIA: ICD-10-CM

## 2024-02-07 DIAGNOSIS — Z96.41 INSULIN PUMP STATUS: ICD-10-CM

## 2024-02-07 DIAGNOSIS — I10 ESSENTIAL HYPERTENSION: ICD-10-CM

## 2024-02-07 PROCEDURE — 95251 CONT GLUC MNTR ANALYSIS I&R: CPT | Performed by: NURSE PRACTITIONER

## 2024-02-07 PROCEDURE — 99214 OFFICE O/P EST MOD 30 MIN: CPT | Performed by: NURSE PRACTITIONER

## 2024-02-07 NOTE — PROGRESS NOTES
Aditya Cuadra 53 y.o. male MRN: [id number]    Encounter: [id number]      Assessment/Plan     Assessment:  This is a 53 y.o.-year-old male with type 1 diabetes with retinopathy on insulin pump therapy and hyperlipidemia.      Plan:  1. Type 1 diabetes with insulin pump status:  His hemoglobin A1c is 7.9. However, his pump and sensor download appears to be improved with some mild hyperglycemia after dinner but also some lower blood sugars in the late afternoon and early evening.  For this, I have adjusted his I:C ratio at noon 4.0, 6 PM to 1.2. I have asked him to continue a proper diabetic diet and perform a download through [employer] in 2 weeks for review. We will contact him with any changes based upon the data from his upcoming report. Check hemoglobin A1c prior to next visit.     2. Peripheral neuropathy:  He continues with intermittent bilateral foot pain.  Continue gabapentin.   He is continuing to follow-up with vascular.  Follow up with Podiatry for regular diabetic foot care.      3.  Hypertension: Reassessed to 142/72 at the end of the visit.  Continue Losartan. Check comprehensive metabolic panel prior to next visit     4. Hyperlipidemia:  Stable. Continue current regimen.  Check fasting lipid panel prior to next visit.    CC: Type 1 Diabetes follow up    History of Present Illness     HPI:  53 y.o. year old male with type 1 diabetes for over 20 years.  He is on insulin at home and takes Novolog via Medtronic 780 g insulin pump the guardian 4 sensor.  Most recent hemoglobin A1c from February 5, 2024 is 7.9. He denies any polyuria, polydipsia, nocturia and blurry vision. He denies nephropathy but does admit to neuropathy and retinopathy.  His Medtronic 670 G insulin pump settings are as follows:          Current settings:  Basal rate  0000 - 1.60 units/hr  0800 - 2.20 units/hr  1200 - 1.90 units/hr  2200 - 1.60 units/hr  Carb ratios  0:00 - 3.0  12:00 - 2.0  18:00 - 1.8  Insulin sensitivity 30  BG  target 130      Hypoglycemic episodes: Afternoon and before dinner recently.     Follows with Dr Hawkins for eye care regularly.  Last visit was in March 2023. The patient's last foot exam was performed at last office visit on April 18, 2023.     Blood Sugar/Glucometer/Pump/CGM review:  Insulin pump download from January 25 through February 7, 2024 reveals an average sensor glucose of 170 with a standard deviation of 46.  He is utilizing a total daily dose of insulin at 78.3 units - 57 % of which is bolus and 43 is basal.     For hypertension, he is treated with losartan 50 mg daily.      For hyperlipidemia continues on simvastatin 40 mg daily.      Review of Systems   Constitutional:  Positive for fatigue. Negative for chills and fever.   HENT: Negative.  Negative for trouble swallowing and voice change.    Eyes:  Negative for photophobia, pain, discharge, redness, itching and visual disturbance.   Respiratory:  Negative for cough and shortness of breath.    Cardiovascular:  Negative for chest pain and palpitations.   Gastrointestinal:  Negative for abdominal pain, constipation, diarrhea, nausea and vomiting.   Endocrine: Positive for polyuria (up to twice per night). Negative for cold intolerance, heat intolerance, polydipsia and polyphagia.   Genitourinary: Negative.    Musculoskeletal:  Positive for back pain and neck pain.   Skin: Negative.    Allergic/Immunologic: Negative.    Neurological:  Negative for dizziness, syncope, light-headedness and headaches.   Hematological: Negative.    Psychiatric/Behavioral: Negative.     All other systems reviewed and are negative.      Historical Information   Past Medical History:   Diagnosis Date    CPAP (continuous positive airway pressure) dependence     Diabetes mellitus (HCC)     Diabetic retinopathy (HCC)     Hyperlipidemia     Hypertension     Neuropathy     feet    Sleep apnea      Past Surgical History:   Procedure Laterality Date    UPPER GASTROINTESTINAL  ENDOSCOPY      VASECTOMY       Social History   Social History     Substance and Sexual Activity   Alcohol Use Yes    Alcohol/week: 14.0 standard drinks of alcohol    Types: 10 Glasses of wine, 4 Cans of beer per week    Comment: 1 drink daily     Social History     Substance and Sexual Activity   Drug Use No     Social History     Tobacco Use   Smoking Status Former    Current packs/day: 0.00    Average packs/day: 0.5 packs/day for 30.0 years (15.0 ttl pk-yrs)    Types: Cigarettes    Start date: 1982    Quit date: 2012    Years since quittin.1   Smokeless Tobacco Never     Family History:   Family History   Problem Relation Age of Onset    No Known Problems Mother     Hyperlipidemia Father     Hypertension Father     Irritable bowel syndrome Father     No Known Problems Brother        Meds/Allergies   Current Outpatient Medications   Medication Sig Dispense Refill    Accu-Chek Guide test strip USE TO TEST 5 TIMES DAILY 500 strip 5    Azelastine HCl 137 MCG/SPRAY SOLN       buPROPion (WELLBUTRIN XL) 300 mg 24 hr tablet Take 300 mg by mouth daily  3    cyanocobalamin (VITAMIN B-12) 500 MCG tablet Take 500 mcg by mouth daily      fexofenadine (ALLEGRA) 180 MG tablet Take 180 mg by mouth daily      fluticasone (FLONASE) 50 mcg/act nasal spray SPRAY 2 PUFFS NASALLY EVERY DAY      gabapentin (NEURONTIN) 300 mg capsule TAKE 2 CAPSULES BY MOUTH TWICE A  capsule 1    losartan (COZAAR) 50 mg tablet TAKE 1 TABLET BY MOUTH EVERY DAY 90 tablet 3    metoclopramide (REGLAN) 5 mg tablet Take 1 tablet (5 mg total) by mouth 2 (two) times a day 60 tablet 12    NovoLOG 100 UNIT/ML injection INJECT UP  UNITS DAILY VIA INSULIN PUMP 90 mL 3    simvastatin (ZOCOR) 40 mg tablet TAKE 1 TABLET BY MOUTH DAILY AT BEDTIME 90 tablet 1    SUMAtriptan (IMITREX) 50 mg tablet TAKE 1 TABLET BY MOUTH TWICE A DAY WITH AT LEAST 2 HOURS BETWEEN DOSES AS NEEDED FOR 30 DAYS      Symbicort 160-4.5 MCG/ACT inhaler Inhale 2 puffs  "2 (two) times a day      albuterol (PROVENTIL HFA,VENTOLIN HFA) 90 mcg/act inhaler TAKE 2 PUFFS BY INHALATION ROUTE EVERY 4 HOURS AS NEEDED AND 20MINUTES PRIOR TO EXERCISE AS NEEDED (Patient not taking: Reported on 2/7/2024)       No current facility-administered medications for this visit.     Allergies   Allergen Reactions    Grass Extracts [Gramineae Pollens] Other (See Comments)     Post nasal drip  cough    Tree Extract Other (See Comments)     Post nasal drip  cough       Objective   Vitals: Height 5' 10.5\" (1.791 m), weight 104 kg (229 lb).    Physical Exam  Vitals reviewed.   Constitutional:       Appearance: He is well-developed.   HENT:      Head: Normocephalic and atraumatic.      Nose: Nose normal.   Eyes:      Conjunctiva/sclera: Conjunctivae normal.      Pupils: Pupils are equal, round, and reactive to light.   Cardiovascular:      Rate and Rhythm: Normal rate and regular rhythm.      Heart sounds: Normal heart sounds.   Pulmonary:      Effort: Pulmonary effort is normal.      Breath sounds: Normal breath sounds.   Abdominal:      General: Bowel sounds are normal.      Palpations: Abdomen is soft.   Musculoskeletal:         General: Normal range of motion.      Cervical back: Normal range of motion and neck supple.   Skin:     General: Skin is warm and dry.   Neurological:      Mental Status: He is alert and oriented to person, place, and time.   Psychiatric:         Behavior: Behavior normal.         Thought Content: Thought content normal.         Judgment: Judgment normal.         Lab Results:   Lab Results   Component Value Date/Time    Hemoglobin A1C 7.9 (H) 02/05/2024 07:20 AM    Hemoglobin A1C 8.0 (H) 10/30/2023 07:59 AM    Hemoglobin A1C 7.9 (H) 07/20/2023 08:27 AM    White Blood Cell Count 9.8 02/05/2024 07:20 AM    White Blood Cell Count 8.5 10/30/2023 07:59 AM    White Blood Cell Count 10.3 07/20/2023 08:27 AM    Hemoglobin 14.4 02/05/2024 07:20 AM    Hemoglobin 13.8 10/30/2023 07:59 AM    " "Hemoglobin 14.5 07/20/2023 08:27 AM    HCT 41.6 02/05/2024 07:20 AM    HCT 39.4 10/30/2023 07:59 AM    HCT 41.9 07/20/2023 08:27 AM     (H) 02/05/2024 07:20 AM    MCV 95 10/30/2023 07:59 AM    MCV 96 07/20/2023 08:27 AM    Platelet Count 308 02/05/2024 07:20 AM    Platelet Count 269 10/30/2023 07:59 AM    Platelet Count 288 07/20/2023 08:27 AM    BUN 9 02/05/2024 07:20 AM    BUN 10 10/30/2023 07:59 AM    BUN 8 07/20/2023 08:27 AM    Potassium 4.7 02/05/2024 07:20 AM    Potassium 4.5 10/30/2023 07:59 AM    Potassium 4.9 07/20/2023 08:27 AM    Chloride 98 02/05/2024 07:20 AM    Chloride 98 10/30/2023 07:59 AM    Chloride 99 07/20/2023 08:27 AM    CO2 24 02/05/2024 07:20 AM    CO2 22 10/30/2023 07:59 AM    CO2 25 07/20/2023 08:27 AM    Creatinine 0.93 02/05/2024 07:20 AM    Creatinine 0.93 10/30/2023 07:59 AM    Creatinine 0.94 07/20/2023 08:27 AM    AST 41 (H) 02/05/2024 07:20 AM    AST 36 10/30/2023 07:59 AM    AST 37 07/20/2023 08:27 AM    ALT 34 02/05/2024 07:20 AM    ALT 29 10/30/2023 07:59 AM    ALT 32 07/20/2023 08:27 AM    Protein, Total 7.0 02/05/2024 07:20 AM    Protein, Total 7.1 10/30/2023 07:59 AM    Protein, Total 7.3 07/20/2023 08:27 AM    Albumin 4.3 02/05/2024 07:20 AM    Albumin 4.2 10/30/2023 07:59 AM    Albumin 4.4 07/20/2023 08:27 AM    Globulin, Total 2.7 02/05/2024 07:20 AM    Globulin, Total 2.9 10/30/2023 07:59 AM    Globulin, Total 2.9 07/20/2023 08:27 AM    HDL 72 02/05/2024 07:20 AM    HDL 79 07/20/2023 08:27 AM    Triglycerides 82 02/05/2024 07:20 AM    Triglycerides 74 07/20/2023 08:27 AM       Portions of the record may have been created with voice recognition software. Occasional wrong word or \"sound a like\" substitutions may have occurred due to the inherent limitations of voice recognition software. Read the chart carefully and recognize, using context, where substitutions have occurred.    "

## 2024-02-07 NOTE — PATIENT INSTRUCTIONS
Be mindful of diet.     Stay active and stay hydrated.     We made slight changes to your pump settings to help with elevated blood sugar readings after dinner and lower blood sugar readings in early evening.     Please perform a download of her pump/sensor via [employer] in the next few weeks for review.     Continue Losartan.     Continue Simvastatin.     Obtain lab work as prescribed.     Follow up with podiatry.

## 2024-04-15 ENCOUNTER — OFFICE VISIT (OUTPATIENT)
Dept: GASTROENTEROLOGY | Facility: CLINIC | Age: 54
End: 2024-04-15
Payer: COMMERCIAL

## 2024-04-15 VITALS
WEIGHT: 231 LBS | BODY MASS INDEX: 32.34 KG/M2 | HEIGHT: 71 IN | SYSTOLIC BLOOD PRESSURE: 128 MMHG | DIASTOLIC BLOOD PRESSURE: 82 MMHG

## 2024-04-15 DIAGNOSIS — K31.84 DIABETIC GASTROPARESIS  (HCC): ICD-10-CM

## 2024-04-15 DIAGNOSIS — Z86.010 PERSONAL HISTORY OF COLONIC POLYPS: ICD-10-CM

## 2024-04-15 DIAGNOSIS — R11.14 BILIOUS VOMITING WITH NAUSEA: Primary | ICD-10-CM

## 2024-04-15 DIAGNOSIS — E11.43 DIABETIC GASTROPARESIS  (HCC): ICD-10-CM

## 2024-04-15 PROCEDURE — 99214 OFFICE O/P EST MOD 30 MIN: CPT | Performed by: INTERNAL MEDICINE

## 2024-04-15 RX ORDER — METOCLOPRAMIDE 5 MG/1
5 TABLET ORAL 4 TIMES DAILY
Qty: 120 TABLET | Refills: 12 | Status: SHIPPED | OUTPATIENT
Start: 2024-04-15

## 2024-04-15 RX ORDER — AMITRIPTYLINE HYDROCHLORIDE 10 MG/1
20 TABLET, FILM COATED ORAL
Qty: 60 TABLET | Refills: 5 | Status: SHIPPED | OUTPATIENT
Start: 2024-04-15

## 2024-04-15 NOTE — LETTER
April 15, 2024     Alden Matamoros DO  [address] [facility] [address]    Patient: Aditya Cuadra   YOB: 1970   Date of Visit: 4/15/2024       Dear Dr. Matamoros:    Thank you for referring Aditya Cuadra to me for evaluation. Below are my notes for this consultation.    If you have questions, please do not hesitate to call me. I look forward to following your patient along with you.         Sincerely,        Michael Davis MD        CC: JOAQUINA English MD  4/15/2024  4:54 PM  Sign when Signing Visit  [facility] - Outpatient Follow-up Note  Aditya Cuadra 53 y.o. male MRN: [id number]  Encounter: [id number]    ASSESSMENT AND PLAN:      1. Bilious vomiting with nausea  2. Diabetic gastroparesis  (HCC)  Nausea and vomiting with coughing in the morning.  Reflux related?  Gastroparesis?  Unfortunately blood sugar still not well-controlled  -Increase Reglan to 5 mg 4 times a day  - Start amitriptyline (ELAVIL) 10 mg tablet; Take 2 tablets (20 mg total) by mouth daily at bedtime  Dispense: 60 tablet; Refill: 5.  -Behavioral modifications including not eating 4 hours for going to bed and elevating the head of the bed  -Gastroparesis diet  -Follow-up with endocrinology to try to get better control of blood sugars    3. Personal history of colonic polyps  Last colonoscopy June 2022.  Due for follow-up 2027      Follow up appointment: 4 months     _______________________      Chief Complaint   Patient presents with   • Follow up-Gastroparesis       HPI:   Patient is a 53 y.o. male with a significant PMH for diabetic gastroparesis presenting for follow up.  He is currently on Reglan 5 mg twice a day.  He reports that most mornings he wakes up and read coughs and regurgitate and then throws up.  Denies any real heartburn.  He admits to some early satiety.  He denies any dysphagia or odynophagia.  Denies any chest or abdominal pain.  His weight is stable.  He has  no side effects from the Reglan.  He is moving his bowels regularly.  Unfortunately his hemoglobin A1c is still elevated    Historical Information  Past Medical History:   Diagnosis Date   • CPAP (continuous positive airway pressure) dependence    • Diabetes mellitus (HCC)    • Diabetic retinopathy (HCC)    • Hyperlipidemia    • Hypertension    • Neuropathy     feet   • Sleep apnea      Past Surgical History:   Procedure Laterality Date   • UPPER GASTROINTESTINAL ENDOSCOPY     • VASECTOMY       Social History     Substance and Sexual Activity   Alcohol Use Yes   • Alcohol/week: 14.0 standard drinks of alcohol   • Types: 10 Glasses of wine, 4 Cans of beer per week    Comment: 1 drink daily     Social History     Substance and Sexual Activity   Drug Use No     Social History     Tobacco Use   Smoking Status Former   • Current packs/day: 0.00   • Average packs/day: 0.5 packs/day for 30.0 years (15.0 ttl pk-yrs)   • Types: Cigarettes   • Start date: 1982   • Quit date: 2012   • Years since quittin.3   Smokeless Tobacco Never     Family History   Problem Relation Age of Onset   • No Known Problems Mother    • Hyperlipidemia Father    • Hypertension Father    • Irritable bowel syndrome Father    • No Known Problems Brother          Current Outpatient Medications:   •  Accu-Chek Guide test strip  •  amitriptyline (ELAVIL) 10 mg tablet  •  Azelastine HCl 137 MCG/SPRAY SOLN  •  buPROPion (WELLBUTRIN XL) 300 mg 24 hr tablet  •  fexofenadine (ALLEGRA) 180 MG tablet  •  fluticasone (FLONASE) 50 mcg/act nasal spray  •  gabapentin (NEURONTIN) 300 mg capsule  •  losartan (COZAAR) 50 mg tablet  •  metoclopramide (REGLAN) 5 mg tablet  •  NovoLOG 100 UNIT/ML injection  •  simvastatin (ZOCOR) 40 mg tablet  •  SUMAtriptan (IMITREX) 50 mg tablet  •  Symbicort 160-4.5 MCG/ACT inhaler  •  albuterol (PROVENTIL HFA,VENTOLIN HFA) 90 mcg/act inhaler  •  cyanocobalamin (VITAMIN B-12) 500 MCG tablet  Allergies   Allergen  "Reactions   • Grass Extracts [Gramineae Pollens] Other (See Comments)     Post nasal drip  cough   • Tree Extract Other (See Comments)     Post nasal drip  cough     Reviewed medications and allergies and updated as indicated    PHYSICAL EXAM:    Blood pressure 128/82, height 5' 10.5\" (1.791 m), weight 105 kg (231 lb). Body mass index is 32.68 kg/m².  General Appearance: NAD, cooperative, alert  Eyes: Anicteric  Chest: Clear to auscultation  Heart: Regular rate and rhythm  GI:  Soft, non-tender, non-distended; normal bowel sounds; no masses, no organomegaly   Rectal: Deferred  Musculoskeletal: No edema.  Skin:  No jaundice    Lab Results:   Lab Results   Component Value Date    WBC 9.8 02/05/2024    WBC 8.5 10/30/2023    WBC 10.3 07/20/2023    HGB 14.4 02/05/2024    HGB 13.8 10/30/2023    HGB 14.5 07/20/2023     (H) 02/05/2024     02/05/2024     10/30/2023     07/20/2023     Lab Results   Component Value Date    K 4.7 02/05/2024    CL 98 02/05/2024    CO2 24 02/05/2024    BUN 9 02/05/2024    CREATININE 0.93 02/05/2024    AST 41 (H) 02/05/2024    AST 36 10/30/2023    AST 37 07/20/2023    ALT 34 02/05/2024    ALT 29 10/30/2023    ALT 32 07/20/2023    EGFR 99 02/05/2024         Radiology Results:   No results found.  "

## 2024-04-15 NOTE — PROGRESS NOTES
[facility] - Outpatient Follow-up Note  Aditya Cuadra 53 y.o. male MRN: [id number]  Encounter: [id number]    ASSESSMENT AND PLAN:      1. Bilious vomiting with nausea  2. Diabetic gastroparesis  (HCC)  Nausea and vomiting with coughing in the morning.  Reflux related?  Gastroparesis?  Unfortunately blood sugar still not well-controlled  -Increase Reglan to 5 mg 4 times a day  - Start amitriptyline (ELAVIL) 10 mg tablet; Take 2 tablets (20 mg total) by mouth daily at bedtime  Dispense: 60 tablet; Refill: 5.  -Behavioral modifications including not eating 4 hours for going to bed and elevating the head of the bed  -Gastroparesis diet  -Follow-up with endocrinology to try to get better control of blood sugars    3. Personal history of colonic polyps  Last colonoscopy June 2022.  Due for follow-up 2027      Follow up appointment: 4 months     _______________________      Chief Complaint   Patient presents with    Follow up-Gastroparesis       HPI:   Patient is a 53 y.o. male with a significant PMH for diabetic gastroparesis presenting for follow up.  He is currently on Reglan 5 mg twice a day.  He reports that most mornings he wakes up and read coughs and regurgitate and then throws up.  Denies any real heartburn.  He admits to some early satiety.  He denies any dysphagia or odynophagia.  Denies any chest or abdominal pain.  His weight is stable.  He has no side effects from the Reglan.  He is moving his bowels regularly.  Unfortunately his hemoglobin A1c is still elevated    Historical Information   Past Medical History:   Diagnosis Date    CPAP (continuous positive airway pressure) dependence     Diabetes mellitus (HCC)     Diabetic retinopathy (HCC)     Hyperlipidemia     Hypertension     Neuropathy     feet    Sleep apnea      Past Surgical History:   Procedure Laterality Date    UPPER GASTROINTESTINAL ENDOSCOPY      VASECTOMY       Social History     Substance and Sexual Activity  "  Alcohol Use Yes    Alcohol/week: 14.0 standard drinks of alcohol    Types: 10 Glasses of wine, 4 Cans of beer per week    Comment: 1 drink daily     Social History     Substance and Sexual Activity   Drug Use No     Social History     Tobacco Use   Smoking Status Former    Current packs/day: 0.00    Average packs/day: 0.5 packs/day for 30.0 years (15.0 ttl pk-yrs)    Types: Cigarettes    Start date: 1982    Quit date: 2012    Years since quittin.3   Smokeless Tobacco Never     Family History   Problem Relation Age of Onset    No Known Problems Mother     Hyperlipidemia Father     Hypertension Father     Irritable bowel syndrome Father     No Known Problems Brother          Current Outpatient Medications:     Accu-Chek Guide test strip    amitriptyline (ELAVIL) 10 mg tablet    Azelastine HCl 137 MCG/SPRAY SOLN    buPROPion (WELLBUTRIN XL) 300 mg 24 hr tablet    fexofenadine (ALLEGRA) 180 MG tablet    fluticasone (FLONASE) 50 mcg/act nasal spray    gabapentin (NEURONTIN) 300 mg capsule    losartan (COZAAR) 50 mg tablet    metoclopramide (REGLAN) 5 mg tablet    NovoLOG 100 UNIT/ML injection    simvastatin (ZOCOR) 40 mg tablet    SUMAtriptan (IMITREX) 50 mg tablet    Symbicort 160-4.5 MCG/ACT inhaler    albuterol (PROVENTIL HFA,VENTOLIN HFA) 90 mcg/act inhaler    cyanocobalamin (VITAMIN B-12) 500 MCG tablet  Allergies   Allergen Reactions    Grass Extracts [Gramineae Pollens] Other (See Comments)     Post nasal drip  cough    Tree Extract Other (See Comments)     Post nasal drip  cough     Reviewed medications and allergies and updated as indicated    PHYSICAL EXAM:    Blood pressure 128/82, height 5' 10.5\" (1.791 m), weight 105 kg (231 lb). Body mass index is 32.68 kg/m².  General Appearance: NAD, cooperative, alert  Eyes: Anicteric  Chest: Clear to auscultation  Heart: Regular rate and rhythm  GI:  Soft, non-tender, non-distended; normal bowel sounds; no masses, no organomegaly   Rectal: " Deferred  Musculoskeletal: No edema.  Skin:  No jaundice    Lab Results:   Lab Results   Component Value Date    WBC 9.8 02/05/2024    WBC 8.5 10/30/2023    WBC 10.3 07/20/2023    HGB 14.4 02/05/2024    HGB 13.8 10/30/2023    HGB 14.5 07/20/2023     (H) 02/05/2024     02/05/2024     10/30/2023     07/20/2023     Lab Results   Component Value Date    K 4.7 02/05/2024    CL 98 02/05/2024    CO2 24 02/05/2024    BUN 9 02/05/2024    CREATININE 0.93 02/05/2024    AST 41 (H) 02/05/2024    AST 36 10/30/2023    AST 37 07/20/2023    ALT 34 02/05/2024    ALT 29 10/30/2023    ALT 32 07/20/2023    EGFR 99 02/05/2024         Radiology Results:   No results found.

## 2024-04-15 NOTE — PATIENT INSTRUCTIONS
Increase Reglan to 4 times a day.  Start amitriptyline 20 mg at bedtime.  Elevate head of the bed on a brick or 2 x 4.  Try to wait 4 hours before going to bed after eating.  Continue to try to get your hemoglobin A1c under 7.  Follow-up office visit 4 months  
I have personally performed a face to face diagnostic evaluation on this patient. I have reviewed the ACP note and agree with the history, exam and plan of care, except as noted.

## 2024-05-08 DIAGNOSIS — K31.84 DIABETIC GASTROPARESIS  (HCC): ICD-10-CM

## 2024-05-08 DIAGNOSIS — E11.43 DIABETIC GASTROPARESIS  (HCC): ICD-10-CM

## 2024-05-08 RX ORDER — AMITRIPTYLINE HYDROCHLORIDE 10 MG/1
20 TABLET, FILM COATED ORAL
Qty: 180 TABLET | Refills: 1 | Status: SHIPPED | OUTPATIENT
Start: 2024-05-08

## 2024-07-19 LAB
ALBUMIN SERPL-MCNC: 4.1 G/DL (ref 3.8–4.9)
ALP SERPL-CCNC: 84 IU/L (ref 44–121)
ALT SERPL-CCNC: 39 IU/L (ref 0–44)
AST SERPL-CCNC: 56 IU/L (ref 0–40)
BASOPHILS # BLD AUTO: 0.1 X10E3/UL (ref 0–0.2)
BASOPHILS NFR BLD AUTO: 1 %
BILIRUB SERPL-MCNC: 0.4 MG/DL (ref 0–1.2)
BUN SERPL-MCNC: 8 MG/DL (ref 6–24)
BUN/CREAT SERPL: 8 (ref 9–20)
CALCIUM SERPL-MCNC: 9.6 MG/DL (ref 8.7–10.2)
CHLORIDE SERPL-SCNC: 98 MMOL/L (ref 96–106)
CO2 SERPL-SCNC: 25 MMOL/L (ref 20–29)
CREAT SERPL-MCNC: 0.98 MG/DL (ref 0.76–1.27)
EGFR: 93 ML/MIN/1.73
EOSINOPHIL # BLD AUTO: 0.6 X10E3/UL (ref 0–0.4)
EOSINOPHIL NFR BLD AUTO: 6 %
ERYTHROCYTE [DISTWIDTH] IN BLOOD BY AUTOMATED COUNT: 12.6 % (ref 11.6–15.4)
EST. AVERAGE GLUCOSE BLD GHB EST-MCNC: 166 MG/DL
GLOBULIN SER-MCNC: 3 G/DL (ref 1.5–4.5)
GLUCOSE SERPL-MCNC: 74 MG/DL (ref 70–99)
HBA1C MFR BLD: 7.4 % (ref 4.8–5.6)
HCT VFR BLD AUTO: 39.6 % (ref 37.5–51)
HGB BLD-MCNC: 13.4 G/DL (ref 13–17.7)
IMM GRANULOCYTES # BLD: 0 X10E3/UL (ref 0–0.1)
IMM GRANULOCYTES NFR BLD: 0 %
LYMPHOCYTES # BLD AUTO: 3.7 X10E3/UL (ref 0.7–3.1)
LYMPHOCYTES NFR BLD AUTO: 39 %
MCH RBC QN AUTO: 32.2 PG (ref 26.6–33)
MCHC RBC AUTO-ENTMCNC: 33.8 G/DL (ref 31.5–35.7)
MCV RBC AUTO: 95 FL (ref 79–97)
MONOCYTES # BLD AUTO: 0.7 X10E3/UL (ref 0.1–0.9)
MONOCYTES NFR BLD AUTO: 7 %
NEUTROPHILS # BLD AUTO: 4.4 X10E3/UL (ref 1.4–7)
NEUTROPHILS NFR BLD AUTO: 47 %
PLATELET # BLD AUTO: 287 X10E3/UL (ref 150–450)
POTASSIUM SERPL-SCNC: 4.4 MMOL/L (ref 3.5–5.2)
PROT SERPL-MCNC: 7.1 G/DL (ref 6–8.5)
RBC # BLD AUTO: 4.16 X10E6/UL (ref 4.14–5.8)
SODIUM SERPL-SCNC: 140 MMOL/L (ref 134–144)
WBC # BLD AUTO: 9.5 X10E3/UL (ref 3.4–10.8)

## 2024-08-02 DIAGNOSIS — E10.42 TYPE 1 DIABETES MELLITUS WITH DIABETIC POLYNEUROPATHY (HCC): ICD-10-CM

## 2024-08-02 RX ORDER — INSULIN ASPART 100 [IU]/ML
INJECTION, SOLUTION INTRAVENOUS; SUBCUTANEOUS
Qty: 90 ML | Refills: 1 | Status: SHIPPED | OUTPATIENT
Start: 2024-08-02

## 2024-08-05 DIAGNOSIS — K31.84 DIABETIC GASTROPARESIS  (HCC): ICD-10-CM

## 2024-08-05 DIAGNOSIS — E11.43 DIABETIC GASTROPARESIS  (HCC): ICD-10-CM

## 2024-08-06 RX ORDER — AMITRIPTYLINE HYDROCHLORIDE 10 MG/1
20 TABLET, FILM COATED ORAL
Qty: 180 TABLET | Refills: 1 | Status: SHIPPED | OUTPATIENT
Start: 2024-08-06

## 2024-08-16 ENCOUNTER — TELEPHONE (OUTPATIENT)
Dept: ENDOCRINOLOGY | Facility: HOSPITAL | Age: 54
End: 2024-08-16

## 2024-08-16 NOTE — TELEPHONE ENCOUNTER
[employer] order for pump and cgm supplies completed and faxed with the last chart note to [phone].    I left message advising patient that this was sent, however insurance requires office notes to be within 6  months. He is due for an appointment so I asked that he call back to schedule.

## 2024-08-19 ENCOUNTER — TELEPHONE (OUTPATIENT)
Dept: GASTROENTEROLOGY | Facility: CLINIC | Age: 54
End: 2024-08-19

## 2024-09-12 LAB
LEFT EYE DIABETIC RETINOPATHY: POSITIVE
RIGHT EYE DIABETIC RETINOPATHY: POSITIVE

## 2024-09-17 DIAGNOSIS — G62.9 PERIPHERAL POLYNEUROPATHY: ICD-10-CM

## 2024-09-17 RX ORDER — GABAPENTIN 300 MG/1
600 CAPSULE ORAL 2 TIMES DAILY
Qty: 360 CAPSULE | Refills: 0 | Status: SHIPPED | OUTPATIENT
Start: 2024-09-17

## 2024-09-25 ENCOUNTER — TELEPHONE (OUTPATIENT)
Dept: ADMINISTRATIVE | Facility: OTHER | Age: 54
End: 2024-09-25

## 2024-09-25 ENCOUNTER — OFFICE VISIT (OUTPATIENT)
Dept: ENDOCRINOLOGY | Facility: HOSPITAL | Age: 54
End: 2024-09-25
Payer: COMMERCIAL

## 2024-09-25 VITALS
HEIGHT: 71 IN | SYSTOLIC BLOOD PRESSURE: 122 MMHG | DIASTOLIC BLOOD PRESSURE: 80 MMHG | WEIGHT: 237 LBS | BODY MASS INDEX: 33.18 KG/M2 | HEART RATE: 112 BPM

## 2024-09-25 DIAGNOSIS — E03.8 CENTRAL HYPOTHYROIDISM: ICD-10-CM

## 2024-09-25 DIAGNOSIS — G62.9 PERIPHERAL POLYNEUROPATHY: ICD-10-CM

## 2024-09-25 DIAGNOSIS — E10.319 DIABETIC RETINOPATHY ASSOCIATED WITH TYPE 1 DIABETES MELLITUS, MACULAR EDEMA PRESENCE UNSPECIFIED, UNSPECIFIED LATERALITY, UNSPECIFIED RETINOPATHY SEVERITY (HCC): ICD-10-CM

## 2024-09-25 DIAGNOSIS — E78.49 OTHER HYPERLIPIDEMIA: ICD-10-CM

## 2024-09-25 DIAGNOSIS — E78.5 HYPERLIPIDEMIA, UNSPECIFIED HYPERLIPIDEMIA TYPE: ICD-10-CM

## 2024-09-25 DIAGNOSIS — K31.84 DIABETIC GASTROPARESIS  (HCC): ICD-10-CM

## 2024-09-25 DIAGNOSIS — E10.42 TYPE 1 DIABETES MELLITUS WITH DIABETIC POLYNEUROPATHY (HCC): Primary | ICD-10-CM

## 2024-09-25 DIAGNOSIS — E11.43 DIABETIC GASTROPARESIS  (HCC): ICD-10-CM

## 2024-09-25 DIAGNOSIS — Z96.41 INSULIN PUMP STATUS: ICD-10-CM

## 2024-09-25 DIAGNOSIS — M79.89 LEFT LEG SWELLING: ICD-10-CM

## 2024-09-25 DIAGNOSIS — I10 ESSENTIAL HYPERTENSION: ICD-10-CM

## 2024-09-25 PROCEDURE — 95251 CONT GLUC MNTR ANALYSIS I&R: CPT | Performed by: NURSE PRACTITIONER

## 2024-09-25 PROCEDURE — 99214 OFFICE O/P EST MOD 30 MIN: CPT | Performed by: NURSE PRACTITIONER

## 2024-09-25 NOTE — PATIENT INSTRUCTIONS
Be mindful of diet.     Stay active and stay hydrated.     We made some changes to your pump settings to help with elevated blood sugar readings after lunch and dinner.     Please perform a download of her pump/sensor via [employer] in the next few weeks for review.     Continue Losartan.     Continue Simvastatin.     Obtain lab work as prescribed.    Obtain ultrasound of your left leg.     Follow up with neurology, vascular and podiatry.

## 2024-09-25 NOTE — PROGRESS NOTES
Aditya Cuadra 54 y.o. male MRN: [id number]    Encounter: [id number]      Assessment & Plan     Assessment:  This is a 54 y.o.-year-old male with type 1 diabetes with retinopathy on insulin pump therapy and hyperlipidemia.     Plan:  1. Type 1 diabetes with insulin pump status:  His hemoglobin A1c is 7.4. However, his pump and sensor download appears to be improved with some mild hyperglycemia after dinner but also some lower blood sugars in the late afternoon and early evening.  For this, I have adjusted his I:C ratio at noon 3.0, 6 PM to 1.0. I have asked him to continue a proper diabetic diet and perform a download through enStage in 2 weeks for review. We will contact him with any changes based upon the data from his upcoming report. Check hemoglobin A1c prior to next visit.     2. Peripheral neuropathy:  He continues with intermittent bilateral foot pain.  Continue gabapentin.   He is continuing to follow-up with vascular.  Follow up with Podiatry for regular diabetic foot care.      3.  Hypertension: Normotensive in the office today.  Continue Losartan. Check comprehensive metabolic panel prior to next visit     4. Hyperlipidemia: Continue current regimen.  Check fasting lipid panel prior to next visit.    5.  Left lower leg swelling: Manifested over past few weeks/months.  Obtain doppler for further evaluation.       CC: Type 1 Diabetes follow up    History of Present Illness     HPI:  53 y.o. year old male with type 1 diabetes for over 20 years.  He is on insulin at home and takes Novolog via Medtronic 780 g insulin pump the guardian 4 sensor.  Most recent hemoglobin A1c from July 18, 2024 is 7.4. He denies any polyuria, polydipsia, nocturia and blurry vision. He denies nephropathy but does admit to neuropathy and retinopathy.  His Medtronic 670 G insulin pump settings are as follows:          Current settings:  Basal rate  0000 - 1.60 units/hr  0800 - 2.20 units/hr  1200 - 1.90 units/hr  2200 - 1.60  units/hr  Carb ratios  0:00 - 3.0  12:00 - 2.0  18:00 - 1.8  Insulin sensitivity 30  BG target 130      Hypoglycemic episodes: Afternoon and before dinner recently.     Follows with Dr Hawkins for eye care regularly.  Last visit was in March 2023. The patient's last foot exam was performed at last office visit on April 18, 2023.     Blood Sugar/Glucometer/Pump/CGM review:  Insulin pump download from September 12 through September 25, 2024 reveals an average sensor glucose of 170 with a standard deviation of 46.  He is utilizing a total daily dose of insulin at 78.3 units - 57 % of which is bolus and 43 is basal.     For hypertension, he is treated with losartan 50 mg daily.      For hyperlipidemia continues on simvastatin 40 mg daily.      Review of Systems   Constitutional:  Positive for fatigue. Negative for chills and fever.   HENT: Negative.  Negative for trouble swallowing and voice change.    Eyes:  Negative for photophobia, pain, discharge, redness, itching and visual disturbance.   Respiratory:  Negative for cough and shortness of breath.    Cardiovascular:  Negative for chest pain and palpitations.   Gastrointestinal:  Negative for abdominal pain, constipation, diarrhea, nausea and vomiting.   Endocrine: Positive for polyuria (1-2 times per night). Negative for cold intolerance, heat intolerance, polydipsia and polyphagia.   Genitourinary: Negative.    Musculoskeletal:  Positive for back pain and myalgias.   Skin: Negative.    Allergic/Immunologic: Negative.    Neurological:  Positive for numbness (left arm (ulnar side)). Negative for dizziness, syncope, light-headedness and headaches.   Hematological: Negative.    Psychiatric/Behavioral: Negative.     All other systems reviewed and are negative.      Historical Information   Past Medical History:   Diagnosis Date    CPAP (continuous positive airway pressure) dependence     Diabetes mellitus (HCC)     Diabetic retinopathy (HCC)     Hyperlipidemia      Hypertension     Neuropathy     feet    Sleep apnea      Past Surgical History:   Procedure Laterality Date    UPPER GASTROINTESTINAL ENDOSCOPY      VASECTOMY       Social History   Social History     Substance and Sexual Activity   Alcohol Use Yes    Alcohol/week: 14.0 standard drinks of alcohol    Types: 10 Glasses of wine, 4 Cans of beer per week    Comment: 1 drink daily     Social History     Substance and Sexual Activity   Drug Use No     Social History     Tobacco Use   Smoking Status Former    Current packs/day: 0.00    Average packs/day: 0.5 packs/day for 30.0 years (15.0 ttl pk-yrs)    Types: Cigarettes    Start date: 1982    Quit date: 2012    Years since quittin.7   Smokeless Tobacco Never     Family History:   Family History   Problem Relation Age of Onset    No Known Problems Mother     Hyperlipidemia Father     Hypertension Father     Irritable bowel syndrome Father     No Known Problems Brother        Meds/Allergies   Current Outpatient Medications   Medication Sig Dispense Refill    Accu-Chek Guide test strip USE TO TEST 5 TIMES DAILY 500 strip 5    albuterol (PROVENTIL HFA,VENTOLIN HFA) 90 mcg/act inhaler TAKE 2 PUFFS BY INHALATION ROUTE EVERY 4 HOURS AS NEEDED AND 20MINUTES PRIOR TO EXERCISE AS NEEDED (Patient not taking: Reported on 2024)      amitriptyline (ELAVIL) 10 mg tablet TAKE 2 TABLETS (20 MG TOTAL) BY MOUTH DAILY AT BEDTIME 180 tablet 1    Azelastine HCl 137 MCG/SPRAY SOLN       buPROPion (WELLBUTRIN XL) 300 mg 24 hr tablet Take 300 mg by mouth daily  3    cyanocobalamin (VITAMIN B-12) 500 MCG tablet Take 500 mcg by mouth daily (Patient not taking: Reported on 4/15/2024)      fexofenadine (ALLEGRA) 180 MG tablet Take 180 mg by mouth daily      fluticasone (FLONASE) 50 mcg/act nasal spray SPRAY 2 PUFFS NASALLY EVERY DAY      gabapentin (NEURONTIN) 300 mg capsule Take 2 capsules (600 mg total) by mouth 2 (two) times a day 360 capsule 0    Insulin Aspart (NovoLOG) 100  units/mL injection INJECT UP  UNITS DAILY VIA INSULIN PUMP 90 mL 1    losartan (COZAAR) 50 mg tablet TAKE 1 TABLET BY MOUTH EVERY DAY 90 tablet 3    metoclopramide (REGLAN) 5 mg tablet Take 1 tablet (5 mg total) by mouth 4 (four) times a day 120 tablet 12    simvastatin (ZOCOR) 40 mg tablet TAKE 1 TABLET BY MOUTH DAILY AT BEDTIME 90 tablet 1    SUMAtriptan (IMITREX) 50 mg tablet TAKE 1 TABLET BY MOUTH TWICE A DAY WITH AT LEAST 2 HOURS BETWEEN DOSES AS NEEDED FOR 30 DAYS      Symbicort 160-4.5 MCG/ACT inhaler Inhale 2 puffs 2 (two) times a day       No current facility-administered medications for this visit.     Allergies   Allergen Reactions    Grass Extracts [Gramineae Pollens] Other (See Comments)     Post nasal drip  cough    Tree Extract Other (See Comments)     Post nasal drip  cough       Objective   Vitals: There were no vitals taken for this visit.    Physical Exam  Vitals reviewed.   Constitutional:       Appearance: He is well-developed.   HENT:      Head: Normocephalic and atraumatic.      Nose: Nose normal.   Eyes:      Conjunctiva/sclera: Conjunctivae normal.      Pupils: Pupils are equal, round, and reactive to light.   Cardiovascular:      Rate and Rhythm: Normal rate and regular rhythm.      Pulses: Pulses are weak.           Dorsalis pedis pulses are 1+ on the right side and 1+ on the left side.        Posterior tibial pulses are 1+ on the right side and 1+ on the left side.      Heart sounds: Normal heart sounds.   Pulmonary:      Effort: Pulmonary effort is normal.      Breath sounds: Normal breath sounds.   Abdominal:      General: Bowel sounds are normal.      Palpations: Abdomen is soft.   Musculoskeletal:         General: Normal range of motion.      Cervical back: Normal range of motion and neck supple.      Left lower leg: Edema present.   Feet:      Right foot:      Skin integrity: No ulcer, skin breakdown, erythema, warmth, callus or dry skin.      Left foot:      Skin integrity: No  ulcer, skin breakdown, erythema, warmth, callus or dry skin.   Skin:     General: Skin is warm and dry.   Neurological:      Mental Status: He is alert and oriented to person, place, and time.   Psychiatric:         Behavior: Behavior normal.         Thought Content: Thought content normal.         Judgment: Judgment normal.       Patient's shoes and socks removed.    Right Foot/Ankle   Right Foot Inspection  Skin Exam: skin normal and skin intact. No dry skin, no warmth, no callus, no erythema, no maceration, no abnormal color, no pre-ulcer, no ulcer and no callus.     Toe Exam: ROM and strength within normal limits.     Sensory   Monofilament testing: diminished    Vascular  Capillary refills: < 3 seconds  The right DP pulse is 1+. The right PT pulse is 1+.     Left Foot/Ankle  Left Foot Inspection  Skin Exam: skin normal and skin intact. No dry skin, no warmth, no erythema, no maceration, normal color, no pre-ulcer, no ulcer and no callus.     Toe Exam: ROM and strength within normal limits and swelling.     Sensory   Monofilament testing: absent    Vascular  Capillary refills: < 3 seconds  The left DP pulse is 1+. The left PT pulse is 1+.     Assign Risk Category  No deformity present  Loss of protective sensation  Weak pulses  Risk: 2      Lab Results:   Lab Results   Component Value Date/Time    Hemoglobin A1C 7.4 (H) 07/18/2024 07:54 AM    Hemoglobin A1C 7.9 (H) 02/05/2024 07:20 AM    Hemoglobin A1C 8.0 (H) 10/30/2023 07:59 AM    White Blood Cell Count 9.5 07/18/2024 07:54 AM    White Blood Cell Count 9.8 02/05/2024 07:20 AM    White Blood Cell Count 8.5 10/30/2023 07:59 AM    Hemoglobin 13.4 07/18/2024 07:54 AM    Hemoglobin 14.4 02/05/2024 07:20 AM    Hemoglobin 13.8 10/30/2023 07:59 AM    HCT 39.6 07/18/2024 07:54 AM    HCT 41.6 02/05/2024 07:20 AM    HCT 39.4 10/30/2023 07:59 AM    MCV 95 07/18/2024 07:54 AM     (H) 02/05/2024 07:20 AM    MCV 95 10/30/2023 07:59 AM    Platelet Count 287 07/18/2024  "07:54 AM    Platelet Count 308 02/05/2024 07:20 AM    Platelet Count 269 10/30/2023 07:59 AM    BUN 8 07/18/2024 07:54 AM    BUN 9 02/05/2024 07:20 AM    BUN 10 10/30/2023 07:59 AM    Potassium 4.4 07/18/2024 07:54 AM    Potassium 4.7 02/05/2024 07:20 AM    Potassium 4.5 10/30/2023 07:59 AM    Chloride 98 07/18/2024 07:54 AM    Chloride 98 02/05/2024 07:20 AM    Chloride 98 10/30/2023 07:59 AM    CO2 25 07/18/2024 07:54 AM    CO2 24 02/05/2024 07:20 AM    CO2 22 10/30/2023 07:59 AM    Creatinine 0.98 07/18/2024 07:54 AM    Creatinine 0.93 02/05/2024 07:20 AM    Creatinine 0.93 10/30/2023 07:59 AM    AST 56 (H) 07/18/2024 07:54 AM    AST 41 (H) 02/05/2024 07:20 AM    AST 36 10/30/2023 07:59 AM    ALT 39 07/18/2024 07:54 AM    ALT 34 02/05/2024 07:20 AM    ALT 29 10/30/2023 07:59 AM    Protein, Total 7.1 07/18/2024 07:54 AM    Protein, Total 7.0 02/05/2024 07:20 AM    Protein, Total 7.1 10/30/2023 07:59 AM    Albumin 4.1 07/18/2024 07:54 AM    Albumin 4.3 02/05/2024 07:20 AM    Albumin 4.2 10/30/2023 07:59 AM    Globulin, Total 3.0 07/18/2024 07:54 AM    Globulin, Total 2.7 02/05/2024 07:20 AM    Globulin, Total 2.9 10/30/2023 07:59 AM    HDL 72 02/05/2024 07:20 AM    Triglycerides 82 02/05/2024 07:20 AM       Portions of the record may have been created with voice recognition software. Occasional wrong word or \"sound a like\" substitutions may have occurred due to the inherent limitations of voice recognition software. Read the chart carefully and recognize, using context, where substitutions have occurred.    "

## 2024-09-25 NOTE — TELEPHONE ENCOUNTER
----- Message from Carlota HUNT sent at 9/25/2024  9:00 AM EDT -----  Regarding: DM EYE EXAM  09/25/24 9:01 AM    Hello, our patient attached above has had a DM Eye Exam performed at [facility] in [place]. Their number is [phone].    Thank you,  Carlota Mae MA  [facility] [place]

## 2024-09-26 NOTE — TELEPHONE ENCOUNTER
Upon review of the In Basket request and the patient's chart, initial outreach has been made via telephone call to facility. Please see Contacts section for details.     Thank you  Selma Villanueva

## 2024-09-26 NOTE — TELEPHONE ENCOUNTER
Upon review of the In Basket request we were able to locate, review, and update the patient chart as requested for Diabetic Eye Exam.    Any additional questions or concerns should be emailed to the Practice Liaisons via the appropriate education email address, please do not reply via In Basket.    Thank you  Selma Villanueva   [facility]

## 2024-10-04 ENCOUNTER — HOSPITAL ENCOUNTER (OUTPATIENT)
Dept: NON INVASIVE DIAGNOSTICS | Age: 54
Discharge: HOME | End: 2024-10-04
Payer: COMMERCIAL

## 2024-10-04 DIAGNOSIS — M79.89 LEFT LEG SWELLING: ICD-10-CM

## 2024-10-04 PROCEDURE — 93971 EXTREMITY STUDY: CPT | Performed by: SURGERY

## 2024-10-04 PROCEDURE — 93971 EXTREMITY STUDY: CPT

## 2024-10-04 NOTE — RESULT ENCOUNTER NOTE
Please call the patient regarding result.  Vascular study of the lower extremities shows no signs of deep vein thrombosis to explain the unilateral swelling in his left leg.

## 2024-10-23 ENCOUNTER — TELEPHONE (OUTPATIENT)
Age: 54
End: 2024-10-23

## 2024-10-23 ENCOUNTER — DOCUMENTATION (OUTPATIENT)
Dept: ENDOCRINOLOGY | Facility: HOSPITAL | Age: 54
End: 2024-10-23

## 2024-10-23 NOTE — PROGRESS NOTES
[employer] CMN for a new pump and cgm completed and faxed with the last chart note to [phone].

## 2024-10-23 NOTE — TELEPHONE ENCOUNTER
Received a call from David from [employer] he is asking if the form could be faxed to him at [phone]. The [employer]tr[employer] form that was faxed to [phone] earlier today

## 2024-11-07 ENCOUNTER — OFFICE VISIT (OUTPATIENT)
Dept: GASTROENTEROLOGY | Facility: CLINIC | Age: 54
End: 2024-11-07
Payer: COMMERCIAL

## 2024-11-07 VITALS
SYSTOLIC BLOOD PRESSURE: 158 MMHG | DIASTOLIC BLOOD PRESSURE: 90 MMHG | HEIGHT: 71 IN | BODY MASS INDEX: 33.6 KG/M2 | WEIGHT: 240 LBS

## 2024-11-07 DIAGNOSIS — E11.43 DIABETIC GASTROPARESIS  (HCC): Primary | ICD-10-CM

## 2024-11-07 DIAGNOSIS — R11.14 BILIOUS VOMITING WITH NAUSEA: ICD-10-CM

## 2024-11-07 DIAGNOSIS — K31.84 DIABETIC GASTROPARESIS  (HCC): Primary | ICD-10-CM

## 2024-11-07 DIAGNOSIS — Z86.0100 HISTORY OF COLONIC POLYPS: ICD-10-CM

## 2024-11-07 DIAGNOSIS — E10.42 TYPE 1 DIABETES MELLITUS WITH DIABETIC POLYNEUROPATHY (HCC): ICD-10-CM

## 2024-11-07 PROCEDURE — 99214 OFFICE O/P EST MOD 30 MIN: CPT | Performed by: INTERNAL MEDICINE

## 2024-11-07 NOTE — LETTER
November 7, 2024     Alden Matamoros DO  [address] [address]    Patient: Aditya Cuadra   YOB: 1970   Date of Visit: 11/7/2024       Dear Dr. Matamoros:    Thank you for referring Aditya Cuadra to me for evaluation. Below are my notes for this consultation.    If you have questions, please do not hesitate to call me. I look forward to following your patient along with you.         Sincerely,        Michael Davis MD        CC: JOAQUINA English MD  11/7/2024  4:48 PM  Sign when Signing Visit  [facility] - Outpatient Follow-up Note  Aditya Cuadra 54 y.o. male MRN: [id number]  Encounter: [id number]    ASSESSMENT AND PLAN:      1. Diabetic gastroparesis  (HCC)  2. Bilious vomiting with nausea  Ongoing nighttime reflux and regurgitation.  On amitriptyline and Reglan  -Continue Reglan 5 mg 3 times a day but then 10 mg at bedtime  -Continue amitriptyline at bedtime  -Stressed the importance of elevating the head of the bed on a brick or 2 x 4  -Stressed the importance of continuing to not eat 4 hours for going to bed  -Continue to work with endocrinology for diabetes control  -Gastrophrenic diet with 5 small meals daily    3. Type 1 diabetes mellitus with diabetic polyneuropathy (HCC)  Follows with Mac Devlin    4. History of colonic polyps  Last colonoscopy June 2022.  Due for follow-up 2027      Follow up appointment: 6 months    _______________________      Chief Complaint   Patient presents with   • Follow-up       HPI:   Patient is a 54 y.o. male with a significant PMH of diabetes with diabetic gastroparesis presenting for follow up regarding ongoing symptoms.  He is currently taking Reglan 4 times a day and amitriptyline at bedtime.  He is tolerating his medications and they are helping a little bit.  He still complains of regurgitation in the morning when he wakes up.  He admits he is trying that he 4 hours for going to bed.  He has  not elevated his head of his bed.  He denies any dysphagia, odynophagia, early satiety.  He denies any vomiting during the day.  He is moving his bowels regularly.  His weight is stable.    Historical Information  Past Medical History:   Diagnosis Date   • CPAP (continuous positive airway pressure) dependence    • Diabetes mellitus (HCC)    • Diabetic retinopathy (HCC)    • Hyperlipidemia    • Hypertension    • Neuropathy     feet   • Sleep apnea      Past Surgical History:   Procedure Laterality Date   • UPPER GASTROINTESTINAL ENDOSCOPY     • VASECTOMY       Social History     Substance and Sexual Activity   Alcohol Use Yes   • Alcohol/week: 14.0 standard drinks of alcohol   • Types: 10 Glasses of wine, 4 Cans of beer per week    Comment: 1 drink daily     Social History     Substance and Sexual Activity   Drug Use No     Social History     Tobacco Use   Smoking Status Former   • Current packs/day: 0.00   • Average packs/day: 0.5 packs/day for 30.0 years (15.0 ttl pk-yrs)   • Types: Cigarettes   • Start date: 1982   • Quit date: 2012   • Years since quittin.9   Smokeless Tobacco Never     Family History   Problem Relation Age of Onset   • No Known Problems Mother    • Hyperlipidemia Father    • Hypertension Father    • Irritable bowel syndrome Father    • No Known Problems Brother          Current Outpatient Medications:   •  Accu-Chek Guide test strip  •  amitriptyline (ELAVIL) 10 mg tablet  •  Azelastine HCl 137 MCG/SPRAY SOLN  •  fexofenadine (ALLEGRA) 180 MG tablet  •  fluticasone (FLONASE) 50 mcg/act nasal spray  •  gabapentin (NEURONTIN) 300 mg capsule  •  Insulin Aspart (NovoLOG) 100 units/mL injection  •  losartan (COZAAR) 50 mg tablet  •  metoclopramide (REGLAN) 5 mg tablet  •  simvastatin (ZOCOR) 40 mg tablet  •  SUMAtriptan (IMITREX) 50 mg tablet  •  Symbicort 160-4.5 MCG/ACT inhaler  •  buPROPion (WELLBUTRIN XL) 300 mg 24 hr tablet  Allergies   Allergen Reactions   • Grass Extracts  "[Gramineae Pollens] Other (See Comments)     Post nasal drip  cough   • Tree Extract Other (See Comments)     Post nasal drip  cough     Reviewed medications and allergies and updated as indicated    PHYSICAL EXAM:    Blood pressure 158/90, height 5' 10.5\" (1.791 m), weight 109 kg (240 lb). Body mass index is 33.95 kg/m².  General Appearance: NAD, cooperative, alert  Eyes: Anicteric  Chest: Clear to auscultation  Heart: Regular rate and rhythm  GI:  Soft, non-tender, non-distended; normal bowel sounds; no masses, no organomegaly   Rectal: Deferred  Musculoskeletal: No edema.  Skin:  No jaundice    Lab Results:   Lab Results   Component Value Date    WBC 9.5 07/18/2024    WBC 9.8 02/05/2024    WBC 8.5 10/30/2023    HGB 13.4 07/18/2024    HGB 14.4 02/05/2024    HGB 13.8 10/30/2023    MCV 95 07/18/2024     07/18/2024     02/05/2024     10/30/2023     Lab Results   Component Value Date    K 4.4 07/18/2024    CL 98 07/18/2024    CO2 25 07/18/2024    BUN 8 07/18/2024    CREATININE 0.98 07/18/2024    AST 56 (H) 07/18/2024    AST 41 (H) 02/05/2024    AST 36 10/30/2023    ALT 39 07/18/2024    ALT 34 02/05/2024    ALT 29 10/30/2023    EGFR 93 07/18/2024         Radiology Results:   No results found.  "

## 2024-11-07 NOTE — PATIENT INSTRUCTIONS
Continue Reglan 4 times a day but take 2 pills at bedtime.  Continue amitriptyline at bedtime.  Elevate the head of the bed on a brick or 2 x 4.  Continue to be conscientious about not eating 4 hours for going to bed.  Follow-up office visit 6 months

## 2024-11-07 NOTE — PROGRESS NOTES
[facility] - Outpatient Follow-up Note  Aditya Cuadra 54 y.o. male MRN: [id number]  Encounter: [id number]    ASSESSMENT AND PLAN:      1. Diabetic gastroparesis  (HCC)  2. Bilious vomiting with nausea  Ongoing nighttime reflux and regurgitation.  On amitriptyline and Reglan  -Continue Reglan 5 mg 3 times a day but then 10 mg at bedtime  -Continue amitriptyline at bedtime  -Stressed the importance of elevating the head of the bed on a brick or 2 x 4  -Stressed the importance of continuing to not eat 4 hours for going to bed  -Continue to work with endocrinology for diabetes control  -Gastrophrenic diet with 5 small meals daily    3. Type 1 diabetes mellitus with diabetic polyneuropathy (HCC)  Follows with Mac Devlin    4. History of colonic polyps  Last colonoscopy June 2022.  Due for follow-up 2027      Follow up appointment: 6 months    _______________________      Chief Complaint   Patient presents with    Follow-up       HPI:   Patient is a 54 y.o. male with a significant PMH of diabetes with diabetic gastroparesis presenting for follow up regarding ongoing symptoms.  He is currently taking Reglan 4 times a day and amitriptyline at bedtime.  He is tolerating his medications and they are helping a little bit.  He still complains of regurgitation in the morning when he wakes up.  He admits he is trying that he 4 hours for going to bed.  He has not elevated his head of his bed.  He denies any dysphagia, odynophagia, early satiety.  He denies any vomiting during the day.  He is moving his bowels regularly.  His weight is stable.    Historical Information   Past Medical History:   Diagnosis Date    CPAP (continuous positive airway pressure) dependence     Diabetes mellitus (HCC)     Diabetic retinopathy (HCC)     Hyperlipidemia     Hypertension     Neuropathy     feet    Sleep apnea      Past Surgical History:   Procedure Laterality Date    UPPER GASTROINTESTINAL ENDOSCOPY       "VASECTOMY       Social History     Substance and Sexual Activity   Alcohol Use Yes    Alcohol/week: 14.0 standard drinks of alcohol    Types: 10 Glasses of wine, 4 Cans of beer per week    Comment: 1 drink daily     Social History     Substance and Sexual Activity   Drug Use No     Social History     Tobacco Use   Smoking Status Former    Current packs/day: 0.00    Average packs/day: 0.5 packs/day for 30.0 years (15.0 ttl pk-yrs)    Types: Cigarettes    Start date: 1982    Quit date: 2012    Years since quittin.9   Smokeless Tobacco Never     Family History   Problem Relation Age of Onset    No Known Problems Mother     Hyperlipidemia Father     Hypertension Father     Irritable bowel syndrome Father     No Known Problems Brother          Current Outpatient Medications:     Accu-Chek Guide test strip    amitriptyline (ELAVIL) 10 mg tablet    Azelastine HCl 137 MCG/SPRAY SOLN    fexofenadine (ALLEGRA) 180 MG tablet    fluticasone (FLONASE) 50 mcg/act nasal spray    gabapentin (NEURONTIN) 300 mg capsule    Insulin Aspart (NovoLOG) 100 units/mL injection    losartan (COZAAR) 50 mg tablet    metoclopramide (REGLAN) 5 mg tablet    simvastatin (ZOCOR) 40 mg tablet    SUMAtriptan (IMITREX) 50 mg tablet    Symbicort 160-4.5 MCG/ACT inhaler    buPROPion (WELLBUTRIN XL) 300 mg 24 hr tablet  Allergies   Allergen Reactions    Grass Extracts [Gramineae Pollens] Other (See Comments)     Post nasal drip  cough    Tree Extract Other (See Comments)     Post nasal drip  cough     Reviewed medications and allergies and updated as indicated    PHYSICAL EXAM:    Blood pressure 158/90, height 5' 10.5\" (1.791 m), weight 109 kg (240 lb). Body mass index is 33.95 kg/m².  General Appearance: NAD, cooperative, alert  Eyes: Anicteric  Chest: Clear to auscultation  Heart: Regular rate and rhythm  GI:  Soft, non-tender, non-distended; normal bowel sounds; no masses, no organomegaly   Rectal: Deferred  Musculoskeletal: No " edema.  Skin:  No jaundice    Lab Results:   Lab Results   Component Value Date    WBC 9.5 07/18/2024    WBC 9.8 02/05/2024    WBC 8.5 10/30/2023    HGB 13.4 07/18/2024    HGB 14.4 02/05/2024    HGB 13.8 10/30/2023    MCV 95 07/18/2024     07/18/2024     02/05/2024     10/30/2023     Lab Results   Component Value Date    K 4.4 07/18/2024    CL 98 07/18/2024    CO2 25 07/18/2024    BUN 8 07/18/2024    CREATININE 0.98 07/18/2024    AST 56 (H) 07/18/2024    AST 41 (H) 02/05/2024    AST 36 10/30/2023    ALT 39 07/18/2024    ALT 34 02/05/2024    ALT 29 10/30/2023    EGFR 93 07/18/2024         Radiology Results:   No results found.

## 2024-12-03 DIAGNOSIS — E78.5 HYPERLIPIDEMIA, UNSPECIFIED HYPERLIPIDEMIA TYPE: ICD-10-CM

## 2024-12-04 RX ORDER — SIMVASTATIN 40 MG
40 TABLET ORAL
Qty: 90 TABLET | Refills: 1 | Status: SHIPPED | OUTPATIENT
Start: 2024-12-04

## 2024-12-24 DIAGNOSIS — I10 ESSENTIAL HYPERTENSION: ICD-10-CM

## 2024-12-24 DIAGNOSIS — E10.42 TYPE 1 DIABETES MELLITUS WITH DIABETIC POLYNEUROPATHY (HCC): ICD-10-CM

## 2024-12-24 DIAGNOSIS — G62.9 PERIPHERAL POLYNEUROPATHY: ICD-10-CM

## 2024-12-24 RX ORDER — LOSARTAN POTASSIUM 50 MG/1
50 TABLET ORAL DAILY
Qty: 90 TABLET | Refills: 1 | Status: SHIPPED | OUTPATIENT
Start: 2024-12-24

## 2024-12-24 RX ORDER — GABAPENTIN 300 MG/1
600 CAPSULE ORAL 2 TIMES DAILY
Qty: 360 CAPSULE | Refills: 1 | Status: SHIPPED | OUTPATIENT
Start: 2024-12-24

## 2024-12-24 RX ORDER — INSULIN ASPART 100 [IU]/ML
INJECTION, SOLUTION INTRAVENOUS; SUBCUTANEOUS
Qty: 90 ML | Refills: 1 | Status: SHIPPED | OUTPATIENT
Start: 2024-12-24

## 2024-12-24 NOTE — TELEPHONE ENCOUNTER
Reason for call:   [x] Refill   [] Prior Auth  [] Other:     Office:   [] PCP/Provider -   [x] Specialty/Provider - [facility] / JOAQUINA English     Medication: gabapentin (NEURONTIN) 300 mg capsule     Dose/Frequency: Take 2 capsules (600 mg total) by mouth 2 (two) times a day     Quantity: 360    Pharmacy: [facility]/pharmacy #[id number]  [place] OCONNELL [address]     Does the patient have enough for 3 days?   [] Yes   [x] No - Send as HP to POD

## 2025-01-24 ENCOUNTER — TELEPHONE (OUTPATIENT)
Dept: GASTROENTEROLOGY | Facility: CLINIC | Age: 55
End: 2025-01-24

## 2025-03-03 DIAGNOSIS — K31.84 DIABETIC GASTROPARESIS  (HCC): ICD-10-CM

## 2025-03-03 DIAGNOSIS — E11.43 DIABETIC GASTROPARESIS  (HCC): ICD-10-CM

## 2025-03-04 RX ORDER — AMITRIPTYLINE HYDROCHLORIDE 10 MG/1
20 TABLET ORAL
Qty: 180 TABLET | Refills: 1 | Status: SHIPPED | OUTPATIENT
Start: 2025-03-04

## 2025-04-24 DIAGNOSIS — R11.14 BILIOUS VOMITING WITH NAUSEA: ICD-10-CM

## 2025-04-24 RX ORDER — METOCLOPRAMIDE 5 MG/1
5 TABLET ORAL 4 TIMES DAILY
Qty: 120 TABLET | Refills: 5 | Status: SHIPPED | OUTPATIENT
Start: 2025-04-24

## 2025-05-31 DIAGNOSIS — E78.5 HYPERLIPIDEMIA, UNSPECIFIED HYPERLIPIDEMIA TYPE: ICD-10-CM

## 2025-06-02 RX ORDER — SIMVASTATIN 40 MG
40 TABLET ORAL
Qty: 30 TABLET | Refills: 0 | Status: SHIPPED | OUTPATIENT
Start: 2025-06-02

## 2025-06-30 DIAGNOSIS — E78.5 HYPERLIPIDEMIA, UNSPECIFIED HYPERLIPIDEMIA TYPE: ICD-10-CM

## 2025-07-01 RX ORDER — SIMVASTATIN 40 MG
40 TABLET ORAL
Qty: 90 TABLET | Refills: 1 | Status: SHIPPED | OUTPATIENT
Start: 2025-07-01

## 2025-07-23 DIAGNOSIS — I10 ESSENTIAL HYPERTENSION: ICD-10-CM

## 2025-07-23 RX ORDER — LOSARTAN POTASSIUM 50 MG/1
50 TABLET ORAL DAILY
Qty: 90 TABLET | Refills: 1 | OUTPATIENT
Start: 2025-07-23